# Patient Record
Sex: FEMALE | Race: WHITE | NOT HISPANIC OR LATINO | Employment: UNEMPLOYED | URBAN - METROPOLITAN AREA
[De-identification: names, ages, dates, MRNs, and addresses within clinical notes are randomized per-mention and may not be internally consistent; named-entity substitution may affect disease eponyms.]

---

## 2017-09-19 ENCOUNTER — OFFICE VISIT (OUTPATIENT)
Dept: MEDICAL GROUP | Facility: MEDICAL CENTER | Age: 57
End: 2017-09-19
Attending: NURSE PRACTITIONER
Payer: MEDICAID

## 2017-09-19 VITALS
HEIGHT: 64 IN | BODY MASS INDEX: 27.49 KG/M2 | TEMPERATURE: 97.1 F | WEIGHT: 161 LBS | DIASTOLIC BLOOD PRESSURE: 62 MMHG | OXYGEN SATURATION: 95 % | SYSTOLIC BLOOD PRESSURE: 110 MMHG | RESPIRATION RATE: 12 BRPM | HEART RATE: 84 BPM

## 2017-09-19 DIAGNOSIS — S90.561A INSECT BITE OF RIGHT ANKLE, INITIAL ENCOUNTER: ICD-10-CM

## 2017-09-19 DIAGNOSIS — L82.1 SEBORRHEIC KERATOSIS: ICD-10-CM
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
## 2017-09-19 DIAGNOSIS — H10.32 ACUTE BACTERIAL CONJUNCTIVITIS OF LEFT EYE: ICD-10-CM

## 2017-09-19 PROCEDURE — 99214 OFFICE O/P EST MOD 30 MIN: CPT | Performed by: NURSE PRACTITIONER

## 2017-09-19 PROCEDURE — 99213 OFFICE O/P EST LOW 20 MIN: CPT | Performed by: NURSE PRACTITIONER

## 2017-09-19 RX ORDER — IBUPROFEN 600 MG/1
600 TABLET ORAL EVERY 12 HOURS PRN
Qty: 60 TAB | Refills: 6 | Status: SHIPPED | OUTPATIENT
Start: 2017-09-19 | End: 2019-04-01 | Stop reason: SDUPTHER

## 2017-09-19 RX ORDER — CYCLOBENZAPRINE HCL 10 MG
TABLET ORAL
Qty: 45 TAB | Refills: 6 | Status: SHIPPED | OUTPATIENT
Start: 2017-09-19 | End: 2018-06-04

## 2017-09-19 RX ORDER — TRAZODONE HYDROCHLORIDE 50 MG/1
50 TABLET ORAL DAILY
Qty: 30 TAB | Refills: 6 | Status: SHIPPED | OUTPATIENT
Start: 2017-09-19 | End: 2018-02-14

## 2017-09-19 RX ORDER — POLYMYXIN B SULFATE AND TRIMETHOPRIM 1; 10000 MG/ML; [USP'U]/ML
1 SOLUTION OPHTHALMIC 4 TIMES DAILY
Qty: 1 BOTTLE | Refills: 0 | Status: SHIPPED | OUTPATIENT
Start: 2017-09-19 | End: 2017-09-22

## 2017-09-19 RX ORDER — SULFAMETHOXAZOLE AND TRIMETHOPRIM 800; 160 MG/1; MG/1
1 TABLET ORAL 2 TIMES DAILY
Qty: 14 TAB | Refills: 0 | Status: SHIPPED | OUTPATIENT
Start: 2017-09-19 | End: 2017-09-26

## 2017-09-19 ASSESSMENT — PAIN SCALES - GENERAL: PAINLEVEL: NO PAIN

## 2017-09-19 NOTE — PROGRESS NOTES
"Subjective:     Chief Complaint   Patient presents with   • Bug Bite     Yola Campuzano is a 57 y.o. female here today for multiple problemsas listed below.      She reports that she woke up 6 days ago with redness and drainage to her left eye. She states that she had \"2 bumps\" under her eye that she thought were bites. She states her symptoms have improved but have not resolved. She has been using artificial tears and eye drops for red eyes. She notes now that her eye feels dry and like there is something in it. She reports mild eye pain but states her vision is intact.     She also reports that she has bug bites to her right inner ankle that she woke up with on Saturday morning. She states that it initally looked like 2 red bumps with pinpoint white bumps. She notes this is increasing in size. She states that it is itchy and tender if you touch it and the tenderness extends up her leg. She has been applying hydrocortisone without relief. She notes she began feeling \"flu-like\" on Saturday night and has continued to have chills and fever although she has not measured this.     She also reports a light brown spot that is rough to her left outer breast that has been present for a month. She denies any underlying lump. She denies any change since she first noticed it. Her last mammogram was in December and was normal. It is not friable. She does not have a history of skin CA.      Current medicines (including changes today)  Current Outpatient Prescriptions   Medication Sig Dispense Refill   • sulfamethoxazole-trimethoprim (BACTRIM DS) 800-160 MG tablet Take 1 Tab by mouth 2 times a day for 7 days. 14 Tab 0   • polymixin-trimethoprim (POLYTRIM) 74208-6.1 UNIT/ML-% Solution Place 1 Drop in both eyes 4 times a day for 3 days. 1 Bottle 0   • Cholecalciferol (D3 SUPER STRENGTH) 2000 UNIT Cap TAKE 1 CAPSULE ORALLY ONCE DAILY 30 Cap 6   • trazodone (DESYREL) 50 MG Tab Take 1 Tab by mouth every day. Nightly for chronic " pain 30 Tab 6   • ibuprofen (MOTRIN) 600 MG Tab Take 1 Tab by mouth every 12 hours as needed for Mild Pain. 60 Tab 6   • cyclobenzaprine (FLEXERIL) 10 MG Tab TAKE 1/2 TO 1 TABLET ORALLY 3 TIMES DAILY IF NEEDED FOR MILD PAIN OR MUSCLE SPASMS 45 Tab 6   • ipratropium (ATROVENT) 17 MCG/ACT Aero Soln Inhale 2 Puffs by mouth 4 times a day. 1 Inhaler 6     No current facility-administered medications for this visit.      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 She also has no past medical history of Addisons disease (CMS-HCC); Adrenal disorder (CMS-HCC); Allergy; Anemia; Arrhythmia; Arthritis; ASTHMA; Blood transfusion; CATARACT; CHF (congestive heart failure) (CMS-HCC); Clotting disorder (CMS-HCC); COPD; Cushings syndrome (CMS-HCC); Diabetes; Diabetic neuropathy (CMS-HCC); EMPHYSEMA; GERD (gastroesophageal reflux disease); Glaucoma; Goiter; Headache; Heart attack (CMS-HCC); Heart murmur; HIV (human immunodeficiency virus infection); IBD (inflammatory bowel disease); Kidney disease; Meningitis; Migraine; OSTEOPOROSIS; Parathyroid disorder (CMS-HCC); Pituitary disease (CMS-HCC); Sickle cell disease (CMS-HCC); Stroke (CMS-HCC); Thyroid disease; or Ulcer (CMS-HCC).      Current medications, allergies and problems list reviewed and updated in [facility].      ROS   Review of systems as documented above in history of present illness.         Objective:     Blood  "pressure 110/62, pulse 84, temperature 36.2 °C (97.1 °F), resp. rate 12, height 1.626 m (5' 4.02\"), weight 73 kg (161 lb), last menstrual period 12/18/2009, SpO2 95 %. Body mass index is 27.62 kg/m².     Physical Exam:  Alert, oriented in no acute distress.  Eye contact is good, speech goal directed, affect calm  HEENT: left conjunctiva is mildly injected. There is mild edema to her upper and lower lids with no obvious bites. Clear drainage is present.  Oral mucous membranes pink and moist with no lesions.  Neck: Supple without adenopathy.  Lungs: normal respiratory rate.   CV: regular rate  Breast: she has a brown, slightly raised lesion to her left outer breast consistent with a seborrheic keratosis.  Skin: 2 raised reddish-purple bumps noted to right inner ankle with 2 central punctate white heads. There is not streaking or adenopathy noted. She does have tenderness that extends up from the area to her right medial mid calf area.      Assessment and Plan:   The following treatment plan was discussed     1. Insect bite of right ankle, initial encounter  I do suspect she was bitten by a venomous insect, most likely a spider based on the characteristics. She does report some flu-like symptoms and has some tenderness up her leg and therefore will start Bactrim BID for 7 days.  RTC in 1 week for re-check. To ER if symptoms worsen despite antibiotic.    sulfamethoxazole-trimethoprim (BACTRIM DS) 800-160 MG tablet   2. Acute bacterial conjunctivitis of left eye  Will start Polytrim for 3 days to her left eye due to ongoing symptoms of redness and drainage.  Stop using the red eye drops. May continue with artifical tears as needed for dryness.    polymixin-trimethoprim (POLYTRIM) 07416-0.1 UNIT/ML-% Solution   3. Seborrheic keratosis  She was educated that the spot of concern on her breast appears like a seborrheic keratosis. It is not a cancerous lesion but we will continue to monitor it. She was educated in the " characteristics of concerning skin lesions and is to report if any of these occur. She will be due for her annual mammogram in December.        Followup: Return in about 1 week (around 9/26/2017), or if symptoms worsen or fail to improve, for Pap.          Please note that this dictation was created using voice recognition software. Every reasonable attempt has been made to correct obvious errors, however there may be errors of grammar and possibly content that were not discovered before finalizing the note.

## 2017-09-27 ENCOUNTER — OFFICE VISIT (OUTPATIENT)
Dept: MEDICAL GROUP | Facility: MEDICAL CENTER | Age: 57
End: 2017-09-27
Attending: NURSE PRACTITIONER
Payer: MEDICAID

## 2017-09-27 ENCOUNTER — HOSPITAL ENCOUNTER (OUTPATIENT)
Facility: MEDICAL CENTER | Age: 57
End: 2017-09-27
Attending: NURSE PRACTITIONER
Payer: MEDICAID

## 2017-09-27 VITALS
HEIGHT: 64 IN | BODY MASS INDEX: 27.49 KG/M2 | RESPIRATION RATE: 20 BRPM | SYSTOLIC BLOOD PRESSURE: 110 MMHG | HEART RATE: 80 BPM | TEMPERATURE: 96.6 F | WEIGHT: 161 LBS | OXYGEN SATURATION: 96 % | DIASTOLIC BLOOD PRESSURE: 66 MMHG

## 2017-09-27 DIAGNOSIS — M25.512 ACUTE PAIN OF LEFT SHOULDER: ICD-10-CM

## 2017-09-27 DIAGNOSIS — Z12.4 SCREENING FOR CERVICAL CANCER: ICD-10-CM

## 2017-09-27 DIAGNOSIS — Z01.419 ROUTINE GYNECOLOGICAL EXAMINATION: ICD-10-CM
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
## 2017-09-27 PROCEDURE — G0101 CA SCREEN;PELVIC/BREAST EXAM: HCPCS | Mod: 25 | Performed by: NURSE PRACTITIONER

## 2017-09-27 PROCEDURE — 87624 HPV HI-RISK TYP POOLED RSLT: CPT

## 2017-09-27 PROCEDURE — 88175 CYTOPATH C/V AUTO FLUID REDO: CPT

## 2017-09-27 PROCEDURE — 99212 OFFICE O/P EST SF 10 MIN: CPT | Performed by: NURSE PRACTITIONER

## 2017-09-27 PROCEDURE — 99213 OFFICE O/P EST LOW 20 MIN: CPT | Mod: 25 | Performed by: NURSE PRACTITIONER

## 2017-09-27 ASSESSMENT — PAIN SCALES - GENERAL: PAINLEVEL: NO PAIN

## 2017-09-28 LAB
CYTOLOGY REG CYTOL: NORMAL
HPV HR 12 DNA CVX QL NAA+PROBE: NEGATIVE
HPV16 DNA SPEC QL NAA+PROBE: NEGATIVE
HPV18 DNA SPEC QL NAA+PROBE: NEGATIVE
SPECIMEN SOURCE: NORMAL

## 2017-10-04 ENCOUNTER — TELEPHONE (OUTPATIENT)
Dept: MEDICAL GROUP | Facility: MEDICAL CENTER | Age: 57
End: 2017-10-04

## 2017-10-04 NOTE — LETTER
October 4, 2017    Yola Campuzano  [address]  [facility] [address]      Dear Yola:    The results of your most recent Pap smear are normal. This means that no cancerous or precancerous cells were seen. We recommend that you come back in 3 years for your next routine Pap smear.    If you have any questions or concerns, please don't hesitate to call.      Sincerely,        ELVA Clay.P.AUTUMN.    Electronically Signed

## 2017-10-05 NOTE — TELEPHONE ENCOUNTER
----- Message from AMERICO Clay sent at 10/3/2017  8:14 AM PDT -----  Please inform patient that her Pap Smear results were reviewed and are within normal limits and a screening exam is recommended in 3 years. Thank you.

## 2018-02-09 DIAGNOSIS — F17.200 TOBACCO DEPENDENCE: ICD-10-CM

## 2018-02-12 RX ORDER — IPRATROPIUM BROMIDE 17 UG/1
2 AEROSOL, METERED RESPIRATORY (INHALATION) 4 TIMES DAILY
Qty: 1 INHALER | Refills: 5 | Status: SHIPPED | OUTPATIENT
Start: 2018-02-12 | End: 2018-02-14 | Stop reason: SDUPTHER

## 2018-02-14 ENCOUNTER — OFFICE VISIT (OUTPATIENT)
Dept: MEDICAL GROUP | Facility: MEDICAL CENTER | Age: 58
End: 2018-02-14
Attending: NURSE PRACTITIONER
Payer: MEDICAID

## 2018-02-14 VITALS
SYSTOLIC BLOOD PRESSURE: 116 MMHG | BODY MASS INDEX: 26.77 KG/M2 | OXYGEN SATURATION: 94 % | DIASTOLIC BLOOD PRESSURE: 64 MMHG | TEMPERATURE: 97.6 F | HEIGHT: 64 IN | WEIGHT: 156.8 LBS

## 2018-02-14 DIAGNOSIS — Z12.39 SCREENING FOR BREAST CANCER: ICD-10-CM

## 2018-02-14 DIAGNOSIS — R19.5 POSITIVE FIT (FECAL IMMUNOCHEMICAL TEST): ICD-10-CM

## 2018-02-14 DIAGNOSIS — F51.01 PRIMARY INSOMNIA: ICD-10-CM

## 2018-02-14 DIAGNOSIS — F17.200 TOBACCO DEPENDENCE: ICD-10-CM

## 2018-02-14 DIAGNOSIS — S49.92XD INJURY OF LEFT SHOULDER, SUBSEQUENT ENCOUNTER: ICD-10-CM

## 2018-02-14 DIAGNOSIS — K02.9 DENTAL CARIES: ICD-10-CM

## 2018-02-14 DIAGNOSIS — E78.5 DYSLIPIDEMIA: ICD-10-CM

## 2018-02-14 PROCEDURE — 99214 OFFICE O/P EST MOD 30 MIN: CPT | Performed by: NURSE PRACTITIONER

## 2018-02-14 PROCEDURE — 99213 OFFICE O/P EST LOW 20 MIN: CPT | Performed by: NURSE PRACTITIONER

## 2018-02-14 RX ORDER — CHOLECALCIFEROL (VITAMIN D3) 50 MCG
1 TABLET ORAL DAILY
Qty: 90 EACH | Refills: 4 | Status: SHIPPED | OUTPATIENT
Start: 2018-02-14 | End: 2019-09-03

## 2018-02-14 ASSESSMENT — PATIENT HEALTH QUESTIONNAIRE - PHQ9: CLINICAL INTERPRETATION OF PHQ2 SCORE: 0

## 2018-02-14 NOTE — ASSESSMENT & PLAN NOTE
She had a positive FIT test in the past few months. She declined a colonoscopy and states she will redo the FIT test instead.

## 2018-02-14 NOTE — ASSESSMENT & PLAN NOTE
She is still having issues with insomnia. Stopped taking the trazodone because she felt like it wasn't working well. She has been taking the cyclobenzaprine PRN QHS for both the sleep aide and for her low back pain. She says this helps. She is starting to sleep earlier, usaully around midnight. She falls asleep fairly easily and wakes once to use the bathroom. She often gets out of bed between 8-10am. She is currently looking for a job and needs to ensure she is awake during the day and asleep at night, rather than the reverse.

## 2018-02-14 NOTE — PROGRESS NOTES
Subjective:     Chief Complaint   Patient presents with   • Flu Vaccine   • Orders Needed   • Hip Pain     might have pulled muscle dancing    • Arm Pain     somtimes     Yola Campuzano is a 58 y.o. female here today for multiple problems as listed below    Osteopenia  Yola is here to ask for a copy of her labslip. She states she is currently taking vitamin D for her osteopenia.     Insomnia  She is still having issues with insomnia. Stopped taking the trazodone because she felt like it wasn't working well. She has been taking the cyclobenzaprine PRN QHS for both the sleep aide and for her low back pain. She says this helps. She is starting to sleep earlier, usaully around midnight. She falls asleep fairly easily and wakes once to use the bathroom. She often gets out of bed between 8-10am. She is currently looking for a job and needs to ensure she is awake during the day and asleep at night, rather than the reverse.    Positive FIT (fecal immunochemical test)  She had a positive FIT test in the past few months. She declined a colonoscopy and states she will redo the FIT test instead.     Tobacco dependence  Yola has decrease her smoking from 1 ppd to 1/2ppd. She states she did this because she is no longer allowed to smoke inside her apartment and has to go outside. She is thinking about quitting and would like to discuss options     She is also requesting a referral for PT for both her shoulder and her low back pain. She is taking NSAIDS PRN with minimal relief for both, and using the cyclobenzaprine mostly as a sleep aide, although it does help her pain. The left shoulder has been hurting for about 3 months, and is now causing a decrease in range of motion. The pain is worse with movement and relieved at risk. Worst pain when reaching behind her back. The low back pain is chronic, and she states she has a new-onset worsening due to thinking she pulled a muscle when dancing.    Finally, she requests a  referral for dentistry. She has multiple dental cavities and some swollen gums. She is swiching and spitting daily with peroxide, which is helping the pain and swelling, but she needs to see a dentist.      Current medicines (including changes today)  Current Outpatient Prescriptions   Medication Sig Dispense Refill   • nicotine polacrilex (NICORETTE) 2 MG Gum Take 1 Each by mouth as needed for Smoking Cessation. 50 Each 4   • ipratropium (ATROVENT HFA) 17 MCG/ACT Aero Soln Inhale 2 Puffs by mouth 4 times a day. 1 Inhaler 5   • Prenatal MV-Min-Fe Fum-FA-DHA (PRENATAL MULTIVITAMIN + DHA) 28-0.8 & 200 MG Misc Take 1 Tab by mouth every day. 90 Each 4   • Cholecalciferol (D3 SUPER STRENGTH) 2000 UNIT Cap TAKE 1 CAPSULE ORALLY ONCE DAILY 30 Cap 6   • ibuprofen (MOTRIN) 600 MG Tab Take 1 Tab by mouth every 12 hours as needed for Mild Pain. 60 Tab 6   • cyclobenzaprine (FLEXERIL) 10 MG Tab TAKE 1/2 TO 1 TABLET ORALLY 3 TIMES DAILY IF NEEDED FOR MILD PAIN OR MUSCLE SPASMS 45 Tab 6     No current facility-administered medications for this visit.      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 She also has no past medical history of Addisons disease (CMS-HCC); Adrenal disorder (CMS-HCC); Allergy; Anemia; Arrhythmia; Arthritis; ASTHMA; Blood transfusion; CATARACT; CHF (congestive heart failure) (CMS-HCC); Clotting disorder (CMS-HCC); COPD; Cushings syndrome  "(CMS-HCC); Diabetes; Diabetic neuropathy (CMS-HCC); EMPHYSEMA; GERD (gastroesophageal reflux disease); Glaucoma; Goiter; Headache(784.0); Heart attack; Heart murmur; HIV (human immunodeficiency virus infection); IBD (inflammatory bowel disease); Kidney disease; Meningitis; Migraine; OSTEOPOROSIS; Parathyroid disorder (CMS-HCC); Pituitary disease (CMS-HCC); Sickle cell disease (CMS-HCC); Stroke (CMS-HCC); Thyroid disease; or Ulcer (CMS-HCC).      Current medications, allergies and problems list reviewed and updated in [facility].      ROS   No chest pain, no shortness of breath, no abdominal pain       Objective:     Blood pressure 116/64, temperature 36.4 °C (97.6 °F), height 1.626 m (5' 4\"), weight 71.1 kg (156 lb 12.8 oz), last menstrual period 12/18/2009, SpO2 94 %. Body mass index is 26.91 kg/m².   Physical Exam:  Alert, oriented in no acute distress.  Eye contact is good, speech goal directed, affect calm  HEENT: conjunctiva non-injected, sclera non-icteric.  Pinna normal. TM pearly gray.   Oral mucous membranes pink and moist with no lesions. Very poor dentition with multiple cavities and broken teeth  Neck: No adenopathy or masses in the neck or supraclavicular regions. No JVD.  Lungs: clear to auscultation bilaterally with good excursion.  CV: regular rate and rhythm.  Abdomen: soft, nontender, No CVAT  Ext: no edema, color normal, vascularity normal, temperature normal  Skin: no rashes or lesions in visible areas.  MSK: full ROM in 4 extremities. Normal gait. No joint swelling/deformity. Pain with external rotation of left arm      Assessment and Plan:   The following treatment plan was discussed   1. Injury of left shoulder, subsequent encounter  REFERRAL TO PHYSICAL THERAPY Reason for Therapy: Eval/Treat/Report  Continue NSAIDS and flexeril PRN   2. Positive FIT (fecal immunochemical test)  Verbalized agreement to repeat FIT test   3. Dyslipidemia  LIPID PANEL   4. Primary insomnia  Continue using flexeril as " sleep aide   5. Screening for breast cancer  MA-SCREEN MAMMO W/CAD-BILAT   6. Tobacco dependence  ipratropium (ATROVENT HFA) 17 MCG/ACT Aero Soln  Begin Nicorette 2mg with every cigarette craving   7. Dental caries  REFERRAL TO DENTISTRY       Followup: 1 month for labs

## 2018-02-14 NOTE — ASSESSMENT & PLAN NOTE
Yola has decrease her smoking from 1 ppd to 1/2ppd. She states she did this because she is no longer allowed to smoke inside her apartment and has to go outside. She is thinking about quitting and would like to discuss options

## 2018-02-14 NOTE — ASSESSMENT & PLAN NOTE
Yola is here to ask for a copy of her labslip. She states she is currently taking vitamin D for her osteopenia.

## 2018-02-26 ENCOUNTER — PHYSICAL THERAPY (OUTPATIENT)
Dept: PHYSICAL THERAPY | Facility: REHABILITATION | Age: 58
End: 2018-02-26
Attending: NURSE PRACTITIONER
Payer: MEDICAID

## 2018-02-26 DIAGNOSIS — M75.42 IMPINGEMENT SYNDROME OF LEFT SHOULDER: ICD-10-CM

## 2018-02-26 PROCEDURE — 97162 PT EVAL MOD COMPLEX 30 MIN: CPT

## 2018-02-26 ASSESSMENT — ENCOUNTER SYMPTOMS
PAIN SCALE AT HIGHEST: 10
PAIN SCALE AT LOWEST: 0
PAIN SCALE: 1
QUALITY: STABBING
PAIN TIMING: WHEN ACTIVE
QUALITY: THROBBING
QUALITY: CRAMPING

## 2018-02-26 NOTE — OP THERAPY EVALUATION
Outpatient Physical Therapy  INITIAL EVALUATION    [facility] [address]  [address]  [place] [address]  Phone:  [phone]  Fax:  [phone]    Date of Evaluation: 2018    Patient: Yola Campuzano  YOB: 1960  MRN: [id number]     Referring Provider: ISIS Russell  [address] [place] [address]  [address]  [place] [address]   Referring Diagnosis Injury of left shoulder, subsequent encounter [S49.92XD]     Time Calculation  Start time: 1200  Stop time: 1235 Time Calculation (min): 35 minutes         Chief Complaint: Shoulder Pain    Visit Diagnoses     ICD-10-CM   1. Impingement syndrome of left shoulder M75.42         Subjective   History of Present Illness:     History of chief complaint:  Pt w/insidious onset bilateral shoulder pain about one year, L>R, noted during sleep. PMHx includes Guillain-[place] syndrome in , hospitalized x 10 days and severe anxiety. Pt also reports more recent c/o hip pain.    Pain:     Current pain ratin    At best pain ratin    At worst pain rating:  10    Location:  Left lateral shoulder (deltoid insertion area)    Quality:  Throbbing, cramping and stabbing    Pain timing:  When active    Aggravating factors:  Sleeping only 1-2 hrs at a time, Reaching OH, Donning bra/reaching behind back    Relieving factors:  Massage, Hot shower, Changing position, Ibuprofen     Activity Tolerance:     Current activity tolerance / Recreational activities:  Relatively sedentary except for ADLs, Dancing 1x/wk through pain    Social Support:     Lives in:  Apartment    Lives with:  Alone      Past Medical History:   Diagnosis Date   • Anxiety    • Carpal tunnel syndrome on right 10/30/2012   • Depression    • Dyslipidemia 2012   • Dyslipidemia 2012   • Elevated blood pressure reading without diagnosis of hypertension 2012   • History of Guillain-[place] syndrome 2016   • Hypertension    • Low back pain 2012    • Numbness and tingling in right hand 8/14/2012   • Osteopenia 8/29/2012   • Seizure (CMS-HCC)     related to ETOH WD   • Skin tag 8/4/2015   • Substance abuse    • Tobacco dependence 8/14/2012   • Tuberculosis     was dx in a rehab ceneter in 2006   • Urinary tract infection, site not specified    • Vaginal discharge 8/14/2012   • Vitamin d deficiency 9/25/2012     Past Surgical History:   Procedure Laterality Date   • OPEN REDUCTION  1991    left ankle       Precautions:       Objective   Observation and functional movement:  Pt w/FHP and T/S kyphosis    Range of motion and strength:    Shoulder AROM: Flex B = 160, Abd B = 150, HAdd B = PIP to AC, HBB R = T8 and L = T12    Palpation and joint mobility:     TTP over AC jt and subscap insertion bilaterally, hypomobile GH post > inf bilaterally    Special tests:      + impingement bilaterally          Exercises/Treatment  Time-based treatments/modalities:          Assessment, Response and Plan:   Assessment details:  58 yr old female presents w/subacute to chronic sxs consistent w/L > R shoulder impingement. Skilled PT indicated to address the following goals.  Goals:   Short Term Goals:   Able to sleep waking less than 2x/night  50% decreased c/o shoulder pain donning bra  50% decreased c/o shoulder pain reaching OH for ADLs  Short term goal time span:  2-4 weeks      Long Term Goals:    Able to sleep waking less than 2x/night  80% decreased c/o shoulder pain donning bra  80% decreased c/o shoulder pain reaching OH for ADLs  Long term goal time span:  4-6 weeks    Plan:   Planned therapy interventions:  E Stim Unattended (CPT 07755), Therapeutic Exercise (CPT 64656) and Neuromuscular Re-education (CPT 89347)  Frequency:  2x week  Duration in weeks:  6  Plan details:  Continue skilled PT to address T/S mobility, posture re-ed, and RC strengthening.      Referring provider co-signature:  I have reviewed this plan of care and my co-signature certifies the need for  services.    Physician Signature: ________________________________ Date: ______________

## 2018-03-12 ENCOUNTER — HOSPITAL ENCOUNTER (OUTPATIENT)
Dept: RADIOLOGY | Facility: MEDICAL CENTER | Age: 58
End: 2018-03-12
Attending: NURSE PRACTITIONER
Payer: MEDICAID

## 2018-03-12 ENCOUNTER — TELEPHONE (OUTPATIENT)
Dept: MEDICAL GROUP | Facility: MEDICAL CENTER | Age: 58
End: 2018-03-12

## 2018-03-12 DIAGNOSIS — Z12.39 SCREENING FOR BREAST CANCER: ICD-10-CM

## 2018-03-12 PROCEDURE — 77067 SCR MAMMO BI INCL CAD: CPT

## 2018-03-12 NOTE — TELEPHONE ENCOUNTER
1. Caller Name: adrian                                         Call Back Number: [phone] (home)         Patient approves a detailed voicemail message: yes    Pt lvm stating she thought at her last appt you guys discussed getting lab work done. I do not see any orders would you mind placing some?

## 2018-03-13 NOTE — TELEPHONE ENCOUNTER
We spoke about her repeating her FIT test, and she should have received that information at her last appointment. Please call pt to f/u and ensure she has gotten the FIT test. If not, please have her come in to get the FIT testing card.

## 2018-03-15 ENCOUNTER — PHYSICAL THERAPY (OUTPATIENT)
Dept: PHYSICAL THERAPY | Facility: REHABILITATION | Age: 58
End: 2018-03-15
Attending: NURSE PRACTITIONER
Payer: MEDICAID

## 2018-03-15 DIAGNOSIS — M75.42 IMPINGEMENT SYNDROME OF LEFT SHOULDER: ICD-10-CM

## 2018-03-15 PROCEDURE — 97014 ELECTRIC STIMULATION THERAPY: CPT

## 2018-03-15 PROCEDURE — 97110 THERAPEUTIC EXERCISES: CPT

## 2018-03-15 NOTE — OP THERAPY DAILY TREATMENT
Outpatient Physical Therapy  DAILY TREATMENT     [facility] [address].  [address]  [place] [address]  Phone:  [phone]  Fax:  [phone]    Date: 03/15/2018    Patient: Yola Campuzano  YOB: 1960  MRN: [id number]     Time Calculation    1530 1620 50 min.     Chief Complaint: Shoulder Pain    Visit #: 2    SUBJECTIVE:  No pain R shoulder, L cont w/moderate ache up to 9/10, currently 4/10.    OBJECTIVE:  AROM L Shoulder: Flex = 132 degrees    Therapeutic Exercises (CPT 09241):     1. Nu Step, x10 min    2. Pulleys flex and abd, x20 ea    3. Rows w/pink TB, x20    Therapeutic Treatments and Modalities:     1. E Stim Unattended (CPT 93319), IFC and MHP to L Shoulder    Time-based treatments/modalities:   Ther Ex = 25 min.     Pain rating before treatment: 4  Pain rating after treatment: 4    ASSESSMENT:   Pt w/mild pain/fear avoidance.    PLAN/RECOMMENDATIONS:   Plan for treatment: therapy treatment to continue next visit.  Planned interventions for next visit: E-stim unattended (CPT 29045), neuromuscular re-education (CPT 34726) and therapeutic exercise (CPT 12335). Foam roller, scap stab, pulleys

## 2018-03-19 ENCOUNTER — PHYSICAL THERAPY (OUTPATIENT)
Dept: PHYSICAL THERAPY | Facility: REHABILITATION | Age: 58
End: 2018-03-19
Attending: NURSE PRACTITIONER
Payer: MEDICAID

## 2018-03-19 DIAGNOSIS — M75.42 IMPINGEMENT SYNDROME OF LEFT SHOULDER: ICD-10-CM

## 2018-03-19 PROCEDURE — 97110 THERAPEUTIC EXERCISES: CPT

## 2018-03-19 PROCEDURE — 97014 ELECTRIC STIMULATION THERAPY: CPT

## 2018-03-19 NOTE — OP THERAPY DAILY TREATMENT
"  Outpatient Physical Therapy  DAILY TREATMENT     [facility] [address].  [address]  [place] [address]  Phone:  [phone]  Fax:  [phone]    Date: 03/19/2018    Patient: Yola Campuzano  YOB: 1960  MRN: [id number]     Time Calculation  Start time: 1630  Stop time: 1720 Time Calculation (min): 50 minutes     Chief Complaint: Shoulder Pain    Visit #: 3    SUBJECTIVE:  Pt reports doing HEP regularly, stiff and achy after 20 reps, \"tends to overdo it\".    OBJECTIVE:  AROM L Shoulder: Flex =  160 degrees    Therapeutic Exercises (CPT 46030):     1. Nu Step, x5 min    2. Pulleys flex and abd, x20 ea    3. True Stretch lunge & trunk rot, x10 ea    4. True Stretch OH head reach w/sit, x10    5. Seated fwd & side trunk stretch, x30 sec ea    Therapeutic Treatments and Modalities:     1. E Stim Unattended (CPT 09019), IFC and MHP to L shoulder    Time-based treatments/modalities:  Therapeutic exercise minutes (CPT 14923): 30 minutes     Pain rating before treatment: 1  Pain rating after treatment: 1 (3-4/10 during activity)    ASSESSMENT:   Pt demos improved ROM and confidence.    PLAN/RECOMMENDATIONS:   Plan for treatment: therapy treatment to continue next visit.  Planned interventions for next visit: E-stim unattended (CPT 01071), neuromuscular re-education (CPT 88141) and therapeutic exercise (CPT 16542). Foam roller, scap stab, pulleys      "

## 2018-03-21 ENCOUNTER — PHYSICAL THERAPY (OUTPATIENT)
Dept: PHYSICAL THERAPY | Facility: REHABILITATION | Age: 58
End: 2018-03-21
Attending: NURSE PRACTITIONER
Payer: MEDICAID

## 2018-03-21 DIAGNOSIS — M75.42 IMPINGEMENT SYNDROME OF LEFT SHOULDER: ICD-10-CM

## 2018-03-21 PROCEDURE — 97110 THERAPEUTIC EXERCISES: CPT

## 2018-03-21 PROCEDURE — 97014 ELECTRIC STIMULATION THERAPY: CPT

## 2018-03-22 NOTE — OP THERAPY DAILY TREATMENT
Outpatient Physical Therapy  DAILY TREATMENT     [facility] [address].  [address]  [place] [address]  Phone:  [phone]  Fax:  [phone]    Date: 03/21/2018    Patient: Yola Campuzano  YOB: 1960  MRN: [id number]     Time Calculation  Start time: 1530  Stop time: 1620 Time Calculation (min): 50 minutes     Chief Complaint: Shoulder Pain    Visit #: 4    SUBJECTIVE:  Pt reports feeling a little bit better.    OBJECTIVE:    Therapeutic Exercises (CPT 61996):     1. Nu Step, x10 min    2. Pulleys flex and abd, x20 ea    3. Qped cat/cow, x10    4. Qped T/S rot, x10 ea    5. Rows w/pink TB, x20    Therapeutic Treatments and Modalities:     1. E Stim Unattended (CPT 84372), IFC and MHP to L shoulder    Time-based treatments/modalities:  Therapeutic exercise minutes (CPT 36336): 30 minutes     Pain rating before treatment: 1  Pain rating after treatment: 1     ASSESSMENT:   Pt making good progress w/ther ex, needs increased compliance at home.    PLAN/RECOMMENDATIONS:   Plan for treatment: therapy treatment to continue next visit.  Foam roller, scap stab, pulleys

## 2018-03-26 ENCOUNTER — PHYSICAL THERAPY (OUTPATIENT)
Dept: PHYSICAL THERAPY | Facility: REHABILITATION | Age: 58
End: 2018-03-26
Attending: NURSE PRACTITIONER
Payer: MEDICAID

## 2018-03-26 DIAGNOSIS — M75.42 IMPINGEMENT SYNDROME OF LEFT SHOULDER: ICD-10-CM

## 2018-03-26 PROCEDURE — 97110 THERAPEUTIC EXERCISES: CPT

## 2018-03-26 PROCEDURE — 97014 ELECTRIC STIMULATION THERAPY: CPT

## 2018-03-26 NOTE — OP THERAPY DAILY TREATMENT
Outpatient Physical Therapy  DAILY TREATMENT     [facility] Physical [address]  [address]  [place] [address]  Phone:  [phone]  Fax:  [phone]    Date: 03/26/2018    Patient: Yola Campuzano  YOB: 1960  MRN: [id number]     Time Calculation  Start time: 1400  Stop time: 1450 Time Calculation (min): 50 minutes     Chief Complaint: Shoulder Pain    Visit #: 5    SUBJECTIVE:  Pt reports increased soreness L shoulder, possibly from qped position.    OBJECTIVE:    Therapeutic Exercises (CPT 62714):     1. Foam Roller, pec stretch, alt UE and alt, serratus    2. Pulleys flex and abd, x20 ea    3. Nu Step, x10 min    Therapeutic Treatments and Modalities:     1. E Stim Unattended (CPT 92671), IFC and MHP to L shoulder    Time-based treatments/modalities:  Therapeutic exercise minutes (CPT 22085): 30 minutes     Pain rating before treatment: 5  Pain rating after treatment: 4    ASSESSMENT:   Pt w/improving adherence to HEP.    PLAN/RECOMMENDATIONS:   Plan for treatment: therapy treatment to continue next visit.  Foam roller, scap stab, pulleys

## 2018-03-29 ENCOUNTER — PHYSICAL THERAPY (OUTPATIENT)
Dept: PHYSICAL THERAPY | Facility: REHABILITATION | Age: 58
End: 2018-03-29
Attending: NURSE PRACTITIONER
Payer: MEDICAID

## 2018-03-29 DIAGNOSIS — M75.42 IMPINGEMENT SYNDROME OF LEFT SHOULDER: ICD-10-CM

## 2018-03-29 PROCEDURE — 97014 ELECTRIC STIMULATION THERAPY: CPT

## 2018-03-29 PROCEDURE — 97110 THERAPEUTIC EXERCISES: CPT

## 2018-03-29 NOTE — OP THERAPY DAILY TREATMENT
Outpatient Physical Therapy  DAILY TREATMENT     [facility] Physical [address]  [address]  [place] [address]  Phone:  [phone]  Fax:  [phone]    Date: 03/29/2018    Patient: Yola Campuzano  YOB: 1960  MRN: [id number]     Time Calculation  Start time: 1500  Stop time: 1540 Time Calculation (min): 40 minutes     Chief Complaint: Shoulder Problem    Visit #: 6    SUBJECTIVE:  Pain intermittent 10/10    OBJECTIVE:  Current objective measures: (L) shoulder hurts worse than (R) AROM (L) 90' abd before pain          Therapeutic Exercises (CPT 56723):     1. [place] , t-band  10    2. Rowing t-band 20, pullys    3. Foam pec    4. Foam serratus punch    5. UBE, 2/2'    Therapeutic Treatments and Modalities:     1. E Stim Unattended (CPT 37068), (L) shoulder 15'    Time-based treatments/modalities:  Therapeutic exercise minutes (CPT 33993): 25 minutes         ASSESSMENT:   Response to treatment: tolerated exercises with occasional pain    PLAN/RECOMMENDATIONS:   Plan for treatment: therapy treatment to continue next visit.  Planned interventions for next visit: E-stim unattended (CPT 00337) and therapeutic exercise (CPT 06870).

## 2018-04-02 ENCOUNTER — APPOINTMENT (OUTPATIENT)
Dept: PHYSICAL THERAPY | Facility: REHABILITATION | Age: 58
End: 2018-04-02
Attending: NURSE PRACTITIONER
Payer: MEDICAID

## 2018-04-04 ENCOUNTER — OFFICE VISIT (OUTPATIENT)
Dept: MEDICAL GROUP | Facility: MEDICAL CENTER | Age: 58
End: 2018-04-04
Attending: FAMILY MEDICINE
Payer: MEDICAID

## 2018-04-04 VITALS
WEIGHT: 156 LBS | SYSTOLIC BLOOD PRESSURE: 120 MMHG | HEART RATE: 80 BPM | TEMPERATURE: 97.3 F | BODY MASS INDEX: 26.63 KG/M2 | HEIGHT: 64 IN | RESPIRATION RATE: 14 BRPM | DIASTOLIC BLOOD PRESSURE: 80 MMHG | OXYGEN SATURATION: 96 %

## 2018-04-04 DIAGNOSIS — J40 BRONCHITIS: ICD-10-CM

## 2018-04-04 LAB
FLUAV+FLUBV AG SPEC QL IA: NEGATIVE
INT CON NEG: NEGATIVE
INT CON POS: POSITIVE

## 2018-04-04 PROCEDURE — 99214 OFFICE O/P EST MOD 30 MIN: CPT | Performed by: FAMILY MEDICINE

## 2018-04-04 PROCEDURE — 99212 OFFICE O/P EST SF 10 MIN: CPT | Performed by: FAMILY MEDICINE

## 2018-04-04 RX ORDER — BENZONATATE 100 MG/1
100 CAPSULE ORAL 3 TIMES DAILY PRN
Qty: 60 CAP | Refills: 0 | Status: SHIPPED | OUTPATIENT
Start: 2018-04-04 | End: 2019-04-01

## 2018-04-04 RX ORDER — ALBUTEROL SULFATE 90 UG/1
2 AEROSOL, METERED RESPIRATORY (INHALATION) EVERY 6 HOURS PRN
Qty: 1 INHALER | Refills: 3 | Status: SHIPPED | OUTPATIENT
Start: 2018-04-04 | End: 2019-09-03

## 2018-04-04 RX ORDER — AZITHROMYCIN 250 MG/1
TABLET, FILM COATED ORAL
Qty: 6 TAB | Refills: 0 | Status: SHIPPED | OUTPATIENT
Start: 2018-04-04 | End: 2019-04-01

## 2018-04-04 ASSESSMENT — ENCOUNTER SYMPTOMS
ABDOMINAL PAIN: 0
VOMITING: 0
HEADACHES: 1
CHILLS: 1
COUGH: 1
SHORTNESS OF BREATH: 0
SPUTUM PRODUCTION: 1
DIARRHEA: 0
SWOLLEN GLANDS: 0
SINUS PAIN: 0
SORE THROAT: 1
NAUSEA: 1
WHEEZING: 0
NECK PAIN: 0
FEVER: 1
RHINORRHEA: 1
PALPITATIONS: 0
MYALGIAS: 0
WEIGHT LOSS: 0
BACK PAIN: 1

## 2018-04-04 NOTE — PROGRESS NOTES
"Subjective:      Yola Campuzano is a 58 y.o. female who presents with Fever and Cough            URI    This is a new problem. The current episode started in the past 7 days. The problem has been unchanged. The maximum temperature recorded prior to her arrival was more than 104 F (per pt). The fever has been present for less than 1 day. Associated symptoms include congestion, coughing, headaches, nausea, rhinorrhea and a sore throat. Pertinent negatives include no abdominal pain, chest pain, diarrhea, dysuria, ear pain, joint pain, joint swelling, neck pain, plugged ear sensation, rash, sinus pain, sneezing, swollen glands, vomiting or wheezing. She has tried acetaminophen and decongestant (will have her continue to use her tessalon as directed and will also have her use an inhaler as needed. if her sxs worsen will have her start a zpak.) for the symptoms.       Review of Systems   Constitutional: Positive for chills and fever. Negative for weight loss.   HENT: Positive for congestion, rhinorrhea and sore throat. Negative for ear pain, hearing loss, sinus pain and sneezing.    Respiratory: Positive for cough and sputum production. Negative for shortness of breath and wheezing.    Cardiovascular: Negative for chest pain and palpitations.   Gastrointestinal: Positive for nausea. Negative for abdominal pain, diarrhea and vomiting.   Genitourinary: Negative for dysuria.   Musculoskeletal: Positive for back pain. Negative for joint pain, myalgias and neck pain.   Skin: Negative for itching and rash.   Neurological: Positive for headaches.          Objective:     /80   Pulse 80   Temp 36.3 °C (97.3 °F)   Resp 14   Ht 1.626 m (5' 4\")   Wt 70.8 kg (156 lb)   LMP 12/18/2009   SpO2 96%   BMI 26.78 kg/m²      Physical Exam   Constitutional: She appears well-developed and well-nourished.   HENT:   Head: Normocephalic and atraumatic.   Congestion, pahyrngeal erythema     Eyes: Conjunctivae and EOM are normal. " Pupils are equal, round, and reactive to light.   Neck: Normal range of motion. Neck supple. No thyromegaly present.   Cardiovascular: Normal rate, regular rhythm, normal heart sounds and intact distal pulses.  Exam reveals no friction rub.    No murmur heard.  Pulmonary/Chest: Effort normal. No respiratory distress. She has no wheezes.   Coarse breath sounds   Abdominal: Soft. Bowel sounds are normal. She exhibits no distension. There is no tenderness.   Lymphadenopathy:     She has no cervical adenopathy.   Nursing note and vitals reviewed.              Assessment/Plan:     1. Bronchitis  A rapid influenza test was done since patient was unable to get a flu shot due to a remote history of GBS. We'll have her continue to use her Tessalon Perles as directed and will add an inhaler to use as needed. If her symptoms continue to persist or worsen we'll have her start a Z-Woody, ER precautions given for suddenly worsened symptoms, chest pains or shortness of breath.

## 2018-04-05 ENCOUNTER — APPOINTMENT (OUTPATIENT)
Dept: PHYSICAL THERAPY | Facility: REHABILITATION | Age: 58
End: 2018-04-05
Attending: NURSE PRACTITIONER
Payer: MEDICAID

## 2018-04-09 ENCOUNTER — APPOINTMENT (OUTPATIENT)
Dept: PHYSICAL THERAPY | Facility: REHABILITATION | Age: 58
End: 2018-04-09
Attending: NURSE PRACTITIONER
Payer: MEDICAID

## 2018-04-12 ENCOUNTER — APPOINTMENT (OUTPATIENT)
Dept: PHYSICAL THERAPY | Facility: REHABILITATION | Age: 58
End: 2018-04-12
Attending: NURSE PRACTITIONER
Payer: MEDICAID

## 2018-04-16 ENCOUNTER — TELEPHONE (OUTPATIENT)
Dept: PHYSICAL THERAPY | Facility: REHABILITATION | Age: 58
End: 2018-04-16

## 2018-04-16 NOTE — OP THERAPY DISCHARGE SUMMARY
Outpatient Physical Therapy  DISCHARGE SUMMARY NOTE      [address].  [address]  [place] [address]  Phone:  [phone]  Fax:  [phone]    Date of Visit: 04/16/2018    Patient: Yola Campuzano  YOB: 1960  MRN: [id number]     Referring Provider: ISIS Russell   Referring Diagnosis Injury of left shoulder, subsequent encounter [S49.92XD]     Physical Therapy Occurrence Codes    Date of onset of impairment:  2/26/17   Date physical therapy care plan established or reviewed:  2/28/18        Your patient is being discharged from Physical Therapy with the following comments:   · Goals partially met  · Patient cancelled or missed more than 2 scheduled appointments/non-compliant    Comments:  Pt w/fluctuating sxs. Demo'd approximately 50% increase in functional ROM. Pt limited most by adherence to regular HEP and illness.     Recommendations:  D/C, pt has cancelled > 3 consecutive appointments.     Kim Harris, PT, DPT    Date: 4/16/2018

## 2018-05-31 DIAGNOSIS — M54.50 CHRONIC MIDLINE LOW BACK PAIN WITHOUT SCIATICA: ICD-10-CM

## 2018-05-31 DIAGNOSIS — G89.29 CHRONIC MIDLINE LOW BACK PAIN WITHOUT SCIATICA: ICD-10-CM

## 2018-06-04 RX ORDER — CYCLOBENZAPRINE HCL 10 MG
TABLET ORAL
Qty: 90 TAB | Refills: 2 | Status: SHIPPED | OUTPATIENT
Start: 2018-06-04 | End: 2019-04-01 | Stop reason: SDUPTHER

## 2019-04-01 ENCOUNTER — HOSPITAL ENCOUNTER (OUTPATIENT)
Dept: RADIOLOGY | Facility: MEDICAL CENTER | Age: 59
End: 2019-04-01
Attending: FAMILY MEDICINE
Payer: MEDICAID

## 2019-04-01 ENCOUNTER — OFFICE VISIT (OUTPATIENT)
Dept: MEDICAL GROUP | Facility: MEDICAL CENTER | Age: 59
End: 2019-04-01
Attending: FAMILY MEDICINE
Payer: MEDICAID

## 2019-04-01 VITALS
SYSTOLIC BLOOD PRESSURE: 115 MMHG | WEIGHT: 165 LBS | TEMPERATURE: 96.6 F | RESPIRATION RATE: 16 BRPM | HEIGHT: 64 IN | OXYGEN SATURATION: 95 % | DIASTOLIC BLOOD PRESSURE: 80 MMHG | BODY MASS INDEX: 28.17 KG/M2 | HEART RATE: 74 BPM

## 2019-04-01 DIAGNOSIS — G89.29 CHRONIC MIDLINE LOW BACK PAIN WITHOUT SCIATICA: ICD-10-CM

## 2019-04-01 DIAGNOSIS — M25.552 HIP PAIN, BILATERAL: ICD-10-CM

## 2019-04-01 DIAGNOSIS — M54.50 CHRONIC MIDLINE LOW BACK PAIN WITHOUT SCIATICA: ICD-10-CM

## 2019-04-01 DIAGNOSIS — M25.551 HIP PAIN, BILATERAL: ICD-10-CM

## 2019-04-01 PROCEDURE — 72100 X-RAY EXAM L-S SPINE 2/3 VWS: CPT

## 2019-04-01 PROCEDURE — 99213 OFFICE O/P EST LOW 20 MIN: CPT | Performed by: FAMILY MEDICINE

## 2019-04-01 PROCEDURE — 73521 X-RAY EXAM HIPS BI 2 VIEWS: CPT

## 2019-04-01 PROCEDURE — 99214 OFFICE O/P EST MOD 30 MIN: CPT | Performed by: FAMILY MEDICINE

## 2019-04-01 RX ORDER — CYCLOBENZAPRINE HCL 10 MG
TABLET ORAL
Qty: 90 TAB | Refills: 2 | Status: SHIPPED | OUTPATIENT
Start: 2019-04-01 | End: 2019-09-03

## 2019-04-01 RX ORDER — GABAPENTIN 100 MG/1
100 CAPSULE ORAL 3 TIMES DAILY
Qty: 90 CAP | Refills: 3 | Status: SHIPPED | OUTPATIENT
Start: 2019-04-01 | End: 2019-05-06

## 2019-04-01 RX ORDER — CHOLECALCIFEROL (VITAMIN D3) 50 MCG
CAPSULE ORAL
Qty: 30 CAP | Refills: 2 | Status: SHIPPED | OUTPATIENT
Start: 2019-04-01 | End: 2019-09-03

## 2019-04-01 RX ORDER — IBUPROFEN 600 MG/1
600 TABLET ORAL EVERY 12 HOURS PRN
Qty: 60 TAB | Refills: 3 | Status: SHIPPED | OUTPATIENT
Start: 2019-04-01 | End: 2019-05-06

## 2019-04-01 ASSESSMENT — ENCOUNTER SYMPTOMS
COUGH: 0
VOMITING: 0
ARTHRALGIAS: 1
MYALGIAS: 1
BACK PAIN: 0
VERTIGO: 0
NUMBNESS: 0
SWOLLEN GLANDS: 0
ABDOMINAL PAIN: 0
NECK PAIN: 0
WEAKNESS: 0
FEVER: 0
ANOREXIA: 0
SPUTUM PRODUCTION: 0
PALPITATIONS: 0
SORE THROAT: 0
PAIN: 1
HEADACHES: 0
CHILLS: 0
SHORTNESS OF BREATH: 0
NAUSEA: 0
CHANGE IN BOWEL HABIT: 0
JOINT SWELLING: 0
VISUAL CHANGE: 0
FATIGUE: 0
DIAPHORESIS: 0
PSYCHIATRIC NEGATIVE: 1

## 2019-04-01 ASSESSMENT — PATIENT HEALTH QUESTIONNAIRE - PHQ9: CLINICAL INTERPRETATION OF PHQ2 SCORE: 0

## 2019-04-01 NOTE — PROGRESS NOTES
"Subjective:      Yola Campuzano is a 59 y.o. female who presents with Hip Pain (bilateral)            Pain   This is a new problem. The current episode started 1 to 4 weeks ago. The problem has been gradually worsening. Associated symptoms include arthralgias and myalgias. Pertinent negatives include no abdominal pain, anorexia, change in bowel habit, chest pain, chills, congestion, coughing, diaphoresis, fatigue, fever, headaches, joint swelling, nausea, neck pain, numbness, rash, sore throat, swollen glands, urinary symptoms, vertigo, visual change, vomiting or weakness. The symptoms are aggravated by standing, walking and bending. She has tried NSAIDs (We will have her get x-rays of her back and hips, we will refill her Motrin, and have her start Flexeril and Neurontin as directed.  A referral to physical therapy will also be made.) for the symptoms.       Review of Systems   Constitutional: Negative for chills, diaphoresis, fatigue and fever.   HENT: Negative for congestion, hearing loss, sore throat and tinnitus.    Respiratory: Negative for cough, sputum production and shortness of breath.    Cardiovascular: Negative for chest pain and palpitations.   Gastrointestinal: Negative for abdominal pain, anorexia, change in bowel habit, nausea and vomiting.   Musculoskeletal: Positive for arthralgias and myalgias. Negative for back pain, joint swelling and neck pain.   Skin: Negative for rash.   Neurological: Negative for vertigo, weakness, numbness and headaches.   Psychiatric/Behavioral: Negative.           Objective:     /80 (BP Location: Left arm, Patient Position: Sitting)   Pulse 74   Temp 35.9 °C (96.6 °F)   Resp 16   Ht 1.626 m (5' 4\")   Wt 74.8 kg (165 lb)   LMP 12/18/2009   SpO2 95%   BMI 28.32 kg/m²      Physical Exam   Constitutional: She is oriented to person, place, and time.   HENT:   Head: Normocephalic and atraumatic.   Eyes: Pupils are equal, round, and reactive to light. " Conjunctivae and EOM are normal.   Neck: Normal range of motion. Neck supple.   Cardiovascular: Normal rate, regular rhythm and normal heart sounds.  Exam reveals no friction rub.    No murmur heard.  Pulmonary/Chest: Effort normal and breath sounds normal. No respiratory distress. She has no wheezes. She has no rales.   Abdominal: Soft. Bowel sounds are normal. She exhibits no distension. There is no tenderness.   Musculoskeletal: Normal range of motion.   Neurological: She is alert and oriented to person, place, and time.   Skin: Skin is warm and dry.   Psychiatric: She has a normal mood and affect. Her behavior is normal.   Nursing note and vitals reviewed.              Assessment/Plan:     1. Hip pain, bilateral  An x-ray of her hip and lower back will be ordered, will also have her continue using her Motrin and Flexeril as directed.  We will add Neurontin at 100 mg 3 times daily.  We will also refer to physical therapy for additional assistance in management of her lower back pain as well as hip pain.  We will continue to follow.  - DX-HIP-BILATERAL-WITH PELVIS-3/4 VIEWS; Future    2. Chronic midline low back pain without sciatica  See above plan.  - cyclobenzaprine (FLEXERIL) 10 MG Tab; TAKE 1/2 TO 1 TABLET BY MOUTH 3 TIMES DAILY IF NEEDED FOR MILD PAIN OR MUSCLE SPASMS  Dispense: 90 Tab; Refill: 2  - DX-LUMBAR SPINE-2 OR 3 VIEWS; Future

## 2019-04-01 NOTE — TELEPHONE ENCOUNTER
Was the patient seen in the last year in this department? Yes    Does patient have an active prescription for medications requested? No     Received Request Via: Pharmacy     Had an appt this am with Dr. Bowman

## 2019-05-06 ENCOUNTER — OFFICE VISIT (OUTPATIENT)
Dept: MEDICAL GROUP | Facility: MEDICAL CENTER | Age: 59
End: 2019-05-06
Attending: FAMILY MEDICINE
Payer: MEDICAID

## 2019-05-06 VITALS
RESPIRATION RATE: 14 BRPM | BODY MASS INDEX: 29.02 KG/M2 | OXYGEN SATURATION: 95 % | HEART RATE: 76 BPM | WEIGHT: 170 LBS | HEIGHT: 64 IN | TEMPERATURE: 97.6 F | SYSTOLIC BLOOD PRESSURE: 130 MMHG | DIASTOLIC BLOOD PRESSURE: 90 MMHG

## 2019-05-06 DIAGNOSIS — Z00.00 ROUTINE HEALTH MAINTENANCE: ICD-10-CM

## 2019-05-06 DIAGNOSIS — Z12.31 SCREENING MAMMOGRAM, ENCOUNTER FOR: ICD-10-CM

## 2019-05-06 DIAGNOSIS — Z12.11 COLON CANCER SCREENING: ICD-10-CM

## 2019-05-06 DIAGNOSIS — M54.40 BILATERAL LOW BACK PAIN WITH SCIATICA, SCIATICA LATERALITY UNSPECIFIED, UNSPECIFIED CHRONICITY: ICD-10-CM

## 2019-05-06 DIAGNOSIS — K02.9 DENTAL CARIES: ICD-10-CM

## 2019-05-06 PROCEDURE — 99212 OFFICE O/P EST SF 10 MIN: CPT | Performed by: FAMILY MEDICINE

## 2019-05-06 PROCEDURE — 99214 OFFICE O/P EST MOD 30 MIN: CPT | Performed by: FAMILY MEDICINE

## 2019-05-06 RX ORDER — ERGOCALCIFEROL 1.25 MG/1
50000 CAPSULE ORAL
Qty: 3 CAP | Refills: 3 | Status: SHIPPED | OUTPATIENT
Start: 2019-05-06 | End: 2019-09-03 | Stop reason: SDUPTHER

## 2019-05-06 RX ORDER — IBUPROFEN 600 MG/1
600 TABLET ORAL EVERY 12 HOURS PRN
Qty: 60 TAB | Refills: 3 | Status: SHIPPED | OUTPATIENT
Start: 2019-05-06 | End: 2019-09-03 | Stop reason: SDUPTHER

## 2019-05-06 RX ORDER — AMOXICILLIN 500 MG/1
500 CAPSULE ORAL 3 TIMES DAILY
Qty: 30 CAP | Refills: 0 | Status: SHIPPED | OUTPATIENT
Start: 2019-05-06 | End: 2019-09-03

## 2019-05-06 RX ORDER — GABAPENTIN 300 MG/1
300 CAPSULE ORAL 3 TIMES DAILY
Qty: 90 CAP | Refills: 3 | Status: SHIPPED | OUTPATIENT
Start: 2019-05-06 | End: 2019-08-27 | Stop reason: SDUPTHER

## 2019-05-06 ASSESSMENT — ENCOUNTER SYMPTOMS
HEADACHES: 0
ABDOMINAL PAIN: 0
FOCAL WEAKNESS: 0
VOMITING: 0
TINGLING: 1
PSYCHIATRIC NEGATIVE: 1
SPUTUM PRODUCTION: 0
COUGH: 0
SHORTNESS OF BREATH: 0
NAUSEA: 0
TREMORS: 0
SENSORY CHANGE: 0
FEVER: 0
BACK PAIN: 1
NECK PAIN: 0
SPEECH CHANGE: 0
CHILLS: 0
PALPITATIONS: 0

## 2019-05-06 NOTE — PROGRESS NOTES
Subjective:      Yola Campuzano is a 59 y.o. female who presents with Results (xray)            Patient 59-year-old female here to follow-up on her recent x-rays of her hip and her back.  The results of her x-rays are as follows:    1.  No radiographic evidence of acute traumatic injury.    2.  Findings are consistent with mild osteoarthritis.    No compression deformity or acute fracture is identified.  FINDINGS ARE CONSISTENT WITH MILD TO MODERATE DEGENERATIVE DISC DISEASE AND FACET ARTHROPATHY.    We will have her continue to use her Motrin as directed.  Patient states that her Motrin has been helping, but this caused her a little bit of drowsiness after taking it.  She is also been taking her Neurontin at 100 mg 3 times daily.  Since her Motrin causes a slight bit of drowsiness, she has been cautioned that Neurontin can do the same.  We will have her increase her Neurontin to 300 mg 3 times a day.  We will also refer to pain management.  She has been seeing a physical therapist and feels that it has been helping her a slight bit.  But she still has occasional breakthrough pain.  We will continue to follow.    She will also like to have her mammogram as well as fit screen reordered today.  She has been keeping up with these exams and has not had any abnormal findings on her previous exams.  We will have her continue to do these tests, and discussed the importance of screening for colon and breast cancers.  We will continue to follow.    She will also need her vitamin D supplement refilled today.  We will have her continue using it as directed.  We will continue to follow.    Since she has not had any recent health maintenance lab work will order lab work for her today to get done prior to her next appointment.  We will continue to follow.     Current medications, allergies, and problem list reviewed with patient and updated in EPIC.          Review of Systems   Constitutional: Negative for chills and fever.  "  HENT: Negative for hearing loss and tinnitus.    Respiratory: Negative for cough, sputum production and shortness of breath.    Cardiovascular: Negative for chest pain and palpitations.   Gastrointestinal: Negative for abdominal pain, nausea and vomiting.   Musculoskeletal: Positive for back pain and joint pain. Negative for neck pain.   Skin: Negative for rash.   Neurological: Positive for tingling. Negative for tremors, sensory change, speech change, focal weakness and headaches.   Psychiatric/Behavioral: Negative.           Objective:     /90 (BP Location: Left arm, Patient Position: Sitting)   Pulse 76   Temp 36.4 °C (97.6 °F)   Resp 14   Ht 1.626 m (5' 4\")   Wt 77.1 kg (170 lb)   LMP 12/18/2009   SpO2 95%   BMI 29.18 kg/m²      Physical Exam   Constitutional: She is oriented to person, place, and time. She appears well-developed and well-nourished.   BMI 29.18   HENT:   Head: Normocephalic and atraumatic.   Neck: Normal range of motion. Neck supple.   Cardiovascular: Normal rate, regular rhythm and normal heart sounds.  Exam reveals no friction rub.    No murmur heard.  Pulmonary/Chest: Effort normal and breath sounds normal. No respiratory distress. She has no wheezes. She has no rales.   Abdominal: Soft. Bowel sounds are normal. She exhibits no distension. There is no tenderness.   Neurological: She is alert and oriented to person, place, and time.   Skin: Skin is warm and dry.   Psychiatric: She has a normal mood and affect. Her behavior is normal.   Nursing note and vitals reviewed.              Assessment/Plan:     1. Screening mammogram, encounter for  Will order an screening mammogram and will continue to follow.  - MA-SCREENING DIGITAL MAMMO; Future    2. Colon cancer screening  Will order a FIT and will continue to follow.  - OCCULT BLOOD FECES IMMUNOASSAY; Future    3. Bilateral low back pain with sciatica, sciatica laterality unspecified, unspecified chronicity  Reviewed the results " of her recent xrays. Will have her continue to use her medications as directed and will refer to pain management. Will continue to follow.  - REFERRAL TO PAIN CLINIC    4. Dental caries  Will have her start amoxicillin and follow up with dental for assistance in management. Will continue to follow.  - amoxicillin (AMOXIL) 500 MG Cap; Take 1 Cap by mouth 3 times a day.  Dispense: 30 Cap; Refill: 0    5. Routine health maintenance  Will order blood work to recheck her labs and will continue to follow.  - Comp Metabolic Panel; Future  - Lipid Profile; Future  - TSH WITH REFLEX TO FT4; Future  - VITAMIN D,25 HYDROXY; Future  - CBC WITH DIFFERENTIAL; Future

## 2019-06-03 ENCOUNTER — HOSPITAL ENCOUNTER (OUTPATIENT)
Dept: RADIOLOGY | Facility: MEDICAL CENTER | Age: 59
End: 2019-06-03
Attending: FAMILY MEDICINE
Payer: MEDICAID

## 2019-06-03 DIAGNOSIS — Z12.31 SCREENING MAMMOGRAM, ENCOUNTER FOR: ICD-10-CM

## 2019-06-03 PROCEDURE — 77063 BREAST TOMOSYNTHESIS BI: CPT

## 2019-06-07 ENCOUNTER — TELEPHONE (OUTPATIENT)
Dept: MEDICAL GROUP | Facility: MEDICAL CENTER | Age: 59
End: 2019-06-07

## 2019-06-07 NOTE — TELEPHONE ENCOUNTER
1. Name: Yola      Call Back Number: [phone]  Patient approves a detailed voicemail message: yes    2. APPLICATION FOR RTC ACCESS paperwork received from patient requiring provider signature. Patient has an upcomming appointment to have paperwork done on 6/12/19.    3. Paperwork placed in MA folder/basket to process.

## 2019-06-12 ENCOUNTER — OFFICE VISIT (OUTPATIENT)
Dept: MEDICAL GROUP | Facility: MEDICAL CENTER | Age: 59
End: 2019-06-12
Attending: FAMILY MEDICINE
Payer: MEDICAID

## 2019-06-12 VITALS
WEIGHT: 169 LBS | OXYGEN SATURATION: 95 % | DIASTOLIC BLOOD PRESSURE: 78 MMHG | HEART RATE: 78 BPM | BODY MASS INDEX: 28.85 KG/M2 | HEIGHT: 64 IN | RESPIRATION RATE: 16 BRPM | SYSTOLIC BLOOD PRESSURE: 122 MMHG | TEMPERATURE: 96.7 F

## 2019-06-12 DIAGNOSIS — M25.551 PAIN OF BOTH HIP JOINTS: ICD-10-CM

## 2019-06-12 DIAGNOSIS — M25.552 PAIN OF BOTH HIP JOINTS: ICD-10-CM

## 2019-06-12 DIAGNOSIS — G56.01 CARPAL TUNNEL SYNDROME ON RIGHT: ICD-10-CM

## 2019-06-12 DIAGNOSIS — M54.40 BILATERAL LOW BACK PAIN WITH SCIATICA, SCIATICA LATERALITY UNSPECIFIED, UNSPECIFIED CHRONICITY: ICD-10-CM

## 2019-06-12 PROCEDURE — 99212 OFFICE O/P EST SF 10 MIN: CPT | Performed by: FAMILY MEDICINE

## 2019-06-12 PROCEDURE — 99214 OFFICE O/P EST MOD 30 MIN: CPT | Performed by: FAMILY MEDICINE

## 2019-06-12 ASSESSMENT — ENCOUNTER SYMPTOMS
HEADACHES: 0
SPUTUM PRODUCTION: 0
FEVER: 0
SENSORY CHANGE: 0
TINGLING: 1
ABDOMINAL PAIN: 0
COUGH: 0
BACK PAIN: 1
VOMITING: 0
TREMORS: 0
PALPITATIONS: 0
SHORTNESS OF BREATH: 0
SPEECH CHANGE: 0
FOCAL WEAKNESS: 0
NAUSEA: 0
CHILLS: 0
PSYCHIATRIC NEGATIVE: 1

## 2019-06-12 NOTE — PROGRESS NOTES
"Subjective:      Yola Campuzano is a 59 y.o. female who presents with Other (rtc paperwork)            Patient 59-year-old female here for follow-up of her chronic low back pain, chronic hip pain and neuropathy in both hands.    Patient today has a form to be filled out for [employer] for assistance with transport to and from her doctor appointments.  She states that she has difficulty using public transportation due to the inability to walk to but stops if they are far from the location she is traveling to.  Her form for RTC access will be filled out today.    She is also requesting a cane for assistance in ambulating.  An order for an adjustable cane will be written today.  We will continue to follow.    She is also following up with pain management who requested a referral be made to physical therapy.  A physical therapy order will be made today.  The order will be written out for 3 times a week for 6 weeks.  She will continue take her other medications as previously directed.  We will continue to follow.     Current medications, allergies, and problem list reviewed with patient and updated in EPIC.          Review of Systems   Constitutional: Negative for chills and fever.   HENT: Negative for hearing loss and tinnitus.    Respiratory: Negative for cough, sputum production and shortness of breath.    Cardiovascular: Negative for chest pain and palpitations.   Gastrointestinal: Negative for abdominal pain, nausea and vomiting.   Musculoskeletal: Positive for back pain and joint pain.   Neurological: Positive for tingling. Negative for tremors, sensory change, speech change, focal weakness and headaches.   Psychiatric/Behavioral: Negative.           Objective:     /78 (BP Location: Left arm, Patient Position: Sitting)   Pulse 78   Temp 35.9 °C (96.7 °F)   Resp 16   Ht 1.626 m (5' 4\")   Wt 76.7 kg (169 lb)   LMP 12/18/2009   SpO2 95%   BMI 29.01 kg/m²      Physical Exam   Constitutional: She is " oriented to person, place, and time.   BMI 29.01   HENT:   Head: Normocephalic and atraumatic.   Cardiovascular: Normal rate, regular rhythm and normal heart sounds.  Exam reveals no friction rub.    No murmur heard.  Pulmonary/Chest: Effort normal and breath sounds normal. No respiratory distress. She has no wheezes. She has no rales.   Abdominal: Soft. Bowel sounds are normal. She exhibits no distension. There is no tenderness.   Musculoskeletal: She exhibits tenderness. She exhibits no edema.   Decreased range of motion of back in flexion and hips in flexion and extension.   Neurological: She is alert and oriented to person, place, and time.   Skin: Skin is warm and dry.   Psychiatric: She has a normal mood and affect. Her behavior is normal.   Nursing note and vitals reviewed.              Assessment/Plan:     1. Carpal tunnel syndrome on right  We will have her referred to physical therapy as well as continue to follow-up with pain management take her medications as directed.  A form was also filled out for [employer] for assistance with transportation to and from her medical appointments.  We will continue to follow.  - REFERRAL TO PHYSICAL THERAPY Reason for Therapy: Eval/Treat/Report  - Misc. Devices Misc; Adjustable cane to use as directed  Dispense: 1 Device; Refill: 0    2. Bilateral low back pain with sciatica, sciatica laterality unspecified, unspecified chronicity  See above plan.  - REFERRAL TO PHYSICAL THERAPY Reason for Therapy: Eval/Treat/Report  - Misc. Devices Misc; Adjustable cane to use as directed  Dispense: 1 Device; Refill: 0    3. Pain of both hip joints  See above plan.  - REFERRAL TO PHYSICAL THERAPY Reason for Therapy: Eval/Treat/Report  - Misc. Devices Misc; Adjustable cane to use as directed  Dispense: 1 Device; Refill: 0

## 2019-07-24 ENCOUNTER — PHYSICAL THERAPY (OUTPATIENT)
Dept: PHYSICAL THERAPY | Facility: REHABILITATION | Age: 59
End: 2019-07-24
Attending: FAMILY MEDICINE
Payer: MEDICAID

## 2019-07-24 DIAGNOSIS — M25.552 PAIN OF BOTH HIP JOINTS: ICD-10-CM

## 2019-07-24 DIAGNOSIS — M54.40 BILATERAL LOW BACK PAIN WITH SCIATICA, SCIATICA LATERALITY UNSPECIFIED, UNSPECIFIED CHRONICITY: ICD-10-CM

## 2019-07-24 DIAGNOSIS — M25.551 PAIN OF BOTH HIP JOINTS: ICD-10-CM

## 2019-07-24 PROCEDURE — 97162 PT EVAL MOD COMPLEX 30 MIN: CPT

## 2019-07-24 SDOH — ECONOMIC STABILITY: GENERAL: QUALITY OF LIFE: GOOD

## 2019-07-24 ASSESSMENT — ENCOUNTER SYMPTOMS
EXACERBATED BY: PROLONGED SITTING
PAIN SCALE: 7
PAIN TIMING: INTERMITTENT
ALLEVIATING FACTORS: STRETCHING
EXACERBATED BY: PROLONGED STANDING
PAIN TIMING: IN THE MORNING
ALLEVIATING FACTORS: PAIN MEDICATION
PAIN SCALE AT HIGHEST: 10
PAIN SCALE AT LOWEST: 0
EXACERBATED BY: LIFTING
EXACERBATED BY: STAIR CLIMBING

## 2019-07-24 NOTE — OP THERAPY EVALUATION
"  Outpatient Physical Therapy  INITIAL EVALUATION    [facility] Physical Therapy [place] [address]  [address]  [facility] [address]  Phone:  [phone]  Fax:  [phone]    Date of Evaluation: 07/24/2019    Patient: Yola Campuzano  YOB: 1960  MRN: [id number]     Referring Provider: Kaden Bowman M.D.  [address] [facility] [address] [facility]  [address]  [place] [address]   Referring Diagnosis Carpal tunnel syndrome on right [G56.01];Bilateral low back pain with sciatica, sciatica laterality unspecified, unspecified chronicity [M54.40];Pain of both hip joints [M25.551, M25.552]     Time Calculation  Start time: 0858  Stop time: 1000 Time Calculation (min): 62 minutes     Physical Therapy Occurrence Codes    Date of onset of impairment:  7/24/09   Date physical therapy care plan established or reviewed:  7/24/19   Date physical therapy treatment started:  7/24/19          Chief Complaint: Back Problem    Visit Diagnoses     ICD-10-CM   1. Bilateral low back pain with sciatica, sciatica laterality unspecified, unspecified chronicity M54.40   2. Pain of both hip joints M25.551    M25.552         Subjective:   History of Present Illness:     Date of onset:  7/24/2009    Mechanism of injury:  Patient is a 59 year old female that was referred for suspected carpal tunnel, low back pain, and bilateral hip discomfort. Due to multiple treatment areas, patient chose to focus on back/hip pain as this is her primary pain area.    Patient reports pain in low back for approximately 10 years, which she feels began when she was working at [employer] and lifting heavy objects. Patient states that her back feels like it is \"going to snap in half\". Describes bilateral back pain that is localized centrally. In addition to back pain, patient complains of left hip pain (that intermittently pops) and left ankle pain (history of broken left ankle in 1991). When pain is the worst, she feels like she cannot walk or put weight on it. Reports that it " takes about 2 hours to get moving in the morning due to back pain; this has prevented her from getting a job.     Previous exercise routine included walking approximately 3 miles, but has reduced this activity due to pain and change in lifestyle     Quality of life:  Good  Prior level of function:  Independent  Headaches:  tension headaches  Headache comments: Recent   Ear problems: none  Sleep disturbance:  Difficulty falling asleep, non-restful sleep and interrupted sleep  Pain:     Current pain ratin    At best pain ratin    At worst pain rating:  10    Location:  Lumbar spine and left hip     Quality: feels like the back is going to break in half.    Pain timing:  Intermittent and in the morning    Relieving factors:  Pain medication and stretching    Aggravating factors:  Prolonged standing, stair climbing, lifting and prolonged sitting    Progression:  Worsening  Social Support:     Lives in:  Apartment (Second floor)    Lives with:  Alone  Diagnostic Tests:     X-ray: abnormal      Diagnostic Tests Comments:   Bilateral Hip X-Ray:  1.  No radiographic evidence of acute traumatic injury.  2.  Findings are consistent with mild osteoarthritis.     Lumbar X-Ray   Vertebral body height is well maintained.  There is no evidence of fracture. Vertebral alignment is normal.  There is mild to moderate degenerative disc disease and facet arthropathy again noted which is most prominent at L4-S1. No significant change.  Treatments:     None      Treatment Comments:  Pain management center for testing (EMG for numbness in right wrist).   Activities of Daily Living:     Patient reported ADL status: Independent with all ADLs and IADLs. Does use grab bars and non-skid mat for safety in shower.   Patient Goals:     Other patient goals:  Return to walking routine. Prevent surgery on the back. Be able to walk to bus stop to allow for transport to work.       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   • Hypertension    • Low back pain 8/14/2012   • Numbness and tingling in right hand 8/14/2012   • Osteopenia 8/29/2012   • Seizure (HCC)     related to ETOH WD   • Skin tag 8/4/2015   • Substance abuse (HCC)    • Tobacco dependence 8/14/2012   • Tuberculosis     was dx in a rehab ceneter in 2006   • Urinary tract infection, site not specified    • Vaginal discharge 8/14/2012   • Vitamin d deficiency 9/25/2012     Past Surgical History:   Procedure Laterality Date   • OPEN REDUCTION  1991    left ankle     Social History   Substance Use Topics   • Smoking status: Current Every Day Smoker     Packs/day: 0.50     Years: 35.00     Types: Cigarettes   • Smokeless tobacco: Never Used   • Alcohol use 6.0 oz/week     12 Cans of beer per week     Family and Occupational History     Social History   • Marital status: Single     Spouse name: N/A   • Number of children: N/A   • Years of education: N/A       Objective     Postural Observations  Seated posture: fair  Standing posture: fair    Additional Postural Observation Details  Forward head and rounded shoulders. Sits with slumped posture.     Hip Screen   Hip joint mobility within functional limits with the following exceptions: Stands with slight flexion at hips; decreased ROM into bilateral hip extension.     Neurological Testing     Reflexes   Left   Patellar (L4): normal (2+)  Achilles (S1): trace (1+)    Right   Patellar (L4): trace (1+)  Achilles (S1): trace (1+)    Myotome testing   Lumbar (left)   L2 (hip flexors): 5  L3 (knee extensors): 5  L4 (ankle dorsiflexors): 5  L5 (great toe extension): 5  S1 (ankle plantar flexors): 5    Lumbar (right)   L2 (hip flexors): 4  L3 (knee extensors): 5  L4 (ankle dorsiflexors): 5  L5  (great toe extension): 5  S1 (ankle plantar flexors): 5    Dermatome testing   Lumbar (left)   All left lumbar dermatomes intact    Lumbar (right)   All right lumbar dermatomes intact    Palpation     Additional Palpation Details  No complaints of pain with palpation over lumbar paraspinals or Q bilaterally.     Tenderness     Left Hip   Tenderness in the greater trochanter.  No tenderness in the ASIS, PSIS and iliac crest.     Right Hip   Tenderness in the PSIS. No tenderness in the ASIS, greater trochanter and iliac crest.     Active Range of Motion     Lumbar   Flexion: within functional limits  Extension: decreased  Left lateral flexion: decreased (with central pain )  Right lateral flexion: within functional limits  Left rotation: decreased (with central pain )  Right rotation: decreased    Joint Play   Spine     Central PA [place]        T12: WFL       L1: WFL       L2: WFL       L3: WFL       L4: WFL       L5: WFL       S1: WFL    Unilateral PA Glide (left)        T12: WFL       L1: WFL       L2: WFL       L3: WFL       L4: WFL       L5: WFL       S1: WFL    Unilateral PA Glide (right)        T12: WFL       L1: WFL       L2: WFL       L3: WFL       L4: WFL       L5: WFL       S1: WFL        Strength:      Left Hip   Planes of Motion   Flexion: 5  Extension: 4  Abduction: 4  Adduction: 4    Right Hip   Planes of Motion   Flexion: 4  Extension: 4  Abduction: 4  Adduction: 4    Left Ankle/Foot   Dorsiflexion: 5  Inversion: 4+  Eversion: 4+  Great toe flexion: 5    Right Ankle/Foot   Dorsiflexion: 5  Inversion: 5  Eversion: 5  Great toe flexion: 5    Tests       Lumbar spine (left)      Positive slump.   Lumbar spine (right)     Negative slump.     Right Pelvic Girdle/Sacrum   Positive: sacral rotation.     Left Hip   Negative SI compression and SI distraction.     Right Hip   Positive SI distraction.     Additional Tests Details  Positive lumbar compression bilaterally (L>R)     Right innominate posteriorly  rotated - improved leg length with MET engaging right hamstrings in supine.   Ambulation     Observational Gait   Gait: antalgic   Decreased walking speed, left stance time, right swing time and right step length.   Left foot contact pattern: foot flat  Right foot contact pattern: foot flat        Therapeutic Exercises (CPT 84291):     1. Hooklying hip abduction, 2 x 10, Pink theraband; added to HEP    2. Hooklying hip adduction, 2 x 10, Pink theraband; added to HEP.       Time-based treatments/modalities:          Assessment, Response and Plan:   Impairments: abnormal or restricted ROM, activity intolerance, impaired functional mobility, impaired physical strength, lacks appropriate home exercise program and pain with function    Assessment details:  Patient is a pleasant 59 year old female who was referred for low back and bilateral hip pain (L>R). She present with the following impairments: limited lumbar and hip ROM, decreased trunk and hip strength, positive slump on the left, and posteriorly rotated right innominate.She responded well to muscle energy techniques (MET) to address this SI joint dysfunction. Patient's gait has also been altered to compensate for left hip and ankle impairments, straining the low back. Based on these evaluation findings, patient would benefit from skilled physical therapy to increase trunk and hip stability, improve gait mechanics, and maintain SI joint alignment. This will allow for decreased pain with functional activities and allow patient to return to walking/access to work.   Barriers to therapy:  None  Prognosis: good    Goals:   Short Term Goals:   1. Patient will be instructed in self-MET to address right posteriorly rotated innominate to assist with proper spinal/hip alignment to decrease pain.   2. Independent with HEP for trunk and hip stabilization and stretching.   Short term goal time span:  2-4 weeks      Long Term Goals:    1. Decrease level of disability due to  back pain based on Davin Ivan Low Back Pain and Disability Questionnaire to 20% or less.   2. Patient will be able to return to walking up to 2 miles daily without significant increase in pain levels to allow for return to leisure activities and access to public transportation.   Long term goal time span:  6-8 weeks    Plan:   Therapy options:  Physical therapy treatment to continue  Planned therapy interventions:  E Stim Unattended (CPT 79678), Therapeutic Exercise (CPT 81262) and Gait Training (CPT 09175)  Frequency:  2x week  Duration in weeks:  8  Duration in visits:  11  Discussed with:  Patient  Plan details:  Follow up on hip abduction/adduction strengthening exercises. Re-assess SI joint alignment. Instruct in basic trunk stabilization exercises.       Functional Limitations and Severity Modifiers  Davin Ivan Low Back Pain and Disability Score: 45.83               Referring provider co-signature:  I have reviewed this plan of care and my co-signature certifies the need for services.  Certification Dates:   From 7/24/2019     To 9/20/2019    Physician Signature: ________________________________ Date: ______________

## 2019-07-31 ENCOUNTER — APPOINTMENT (OUTPATIENT)
Dept: PHYSICAL THERAPY | Facility: REHABILITATION | Age: 59
End: 2019-07-31
Attending: FAMILY MEDICINE
Payer: MEDICAID

## 2019-08-02 ENCOUNTER — APPOINTMENT (OUTPATIENT)
Dept: PHYSICAL THERAPY | Facility: REHABILITATION | Age: 59
End: 2019-08-02
Attending: FAMILY MEDICINE
Payer: MEDICAID

## 2019-08-09 ENCOUNTER — APPOINTMENT (OUTPATIENT)
Dept: PHYSICAL THERAPY | Facility: REHABILITATION | Age: 59
End: 2019-08-09
Attending: FAMILY MEDICINE
Payer: MEDICAID

## 2019-08-14 ENCOUNTER — PHYSICAL THERAPY (OUTPATIENT)
Dept: PHYSICAL THERAPY | Facility: REHABILITATION | Age: 59
End: 2019-08-14
Attending: FAMILY MEDICINE
Payer: MEDICAID

## 2019-08-14 DIAGNOSIS — M54.40 BILATERAL LOW BACK PAIN WITH SCIATICA, SCIATICA LATERALITY UNSPECIFIED, UNSPECIFIED CHRONICITY: ICD-10-CM

## 2019-08-14 PROCEDURE — 97110 THERAPEUTIC EXERCISES: CPT

## 2019-08-14 NOTE — OP THERAPY DAILY TREATMENT
Outpatient Physical Therapy  DAILY TREATMENT     [facility] Physical [address]  [address]  [place] [address]  Phone:  [phone]  Fax:  [phone]    Date: 08/14/2019    Patient: Yola Campuzano  YOB: 1960  MRN: [id number]     Time Calculation  Start time: 1404  Stop time: 1430 Time Calculation (min): 26 minutes       Chief Complaint: Back Problem    Visit #: 2    SUBJECTIVE:  Patient reports no changes in function or increase in pain. No questions regarding previously provided exercises.     OBJECTIVE:  Current objective measures:           Therapeutic Exercises (CPT 90053):     1. Trunk rotations, 3 x 10 seconds bilaterally, Added to HEP    2. Figure 4 stretch, 2 x 30 seconds bilaterally, Added to HEP    3. Seated hamstring stretch, 2 x 30 seconds bilaterally, Added to HEP    4. TrA activation, 10 x 5 seconds with tactile cues, Added to HEP. Patient had difficulty with  TrA, pelvic floor, and gluts    5. Bridges, 2 x 10, Added to HEP      Therapeutic Exercise Summary: Checked SI joint/pelvic alignment and noted equal leg length       Time-based treatments/modalities:  Therapeutic exercise minutes (CPT 78194): 26 minutes       Pain rating before treatment: 1  Pain rating after treatment: 1    ASSESSMENT:   Response to treatment: Patient tolerated all basic stretching and strengthening well. Had significant difficulty with activating her transverse abdominals, which suggests that she has poor stability surrounding her lumbar spine. Will continue to address.    PLAN/RECOMMENDATIONS:   Plan for treatment: TrA activation with BP cuff for feedback - BKFO, marching, etc. .  Planned interventions for next visit: continue with current treatment.

## 2019-08-16 ENCOUNTER — APPOINTMENT (OUTPATIENT)
Dept: PHYSICAL THERAPY | Facility: REHABILITATION | Age: 59
End: 2019-08-16
Attending: FAMILY MEDICINE
Payer: MEDICAID

## 2019-08-21 ENCOUNTER — PHYSICAL THERAPY (OUTPATIENT)
Dept: PHYSICAL THERAPY | Facility: REHABILITATION | Age: 59
End: 2019-08-21
Attending: FAMILY MEDICINE
Payer: MEDICAID

## 2019-08-21 DIAGNOSIS — M54.40 BILATERAL LOW BACK PAIN WITH SCIATICA, SCIATICA LATERALITY UNSPECIFIED, UNSPECIFIED CHRONICITY: ICD-10-CM

## 2019-08-21 PROCEDURE — 97110 THERAPEUTIC EXERCISES: CPT

## 2019-08-21 NOTE — OP THERAPY DAILY TREATMENT
Outpatient Physical Therapy  DAILY TREATMENT     [address]  [address]  [place] [address]  Phone:  [phone]  Fax:  [phone]    Date: 08/21/2019    Patient: Yola Campuzano  YOB: 1960  MRN: [id number]     Time Calculation  Start time: 1135  Stop time: 1210 Time Calculation (min): 35 minutes       Chief Complaint: Back Problem    Visit #: 3    SUBJECTIVE:  Patient reports no questions regarding HEP but has noted some increased popping in her left hip, especially with bed mobility. Does not feel that this is associated with the stretching.     OBJECTIVE:  Current objective measures:           Therapeutic Exercises (CPT 59708):     1. TrA in hooklying with BP cuff, Performed to improve awareness of TrA activation    2. TrA in hooklying - marching , 2 x 10    3. TrA in hooklying - BKFO, 2 x 10    4. Seated on ball - marching, 2 x 10, Good TrA activation with minimal ball movement noted    5. Seated EOM - TrA activation, 10 x 5 second hold, Added to HEP with progression toward counting out loud to encourage breathing.       Therapeutic Exercise Summary: A great deal of education provided on location of TrA muscles, how to engage these muscles, and why activation is important.       Time-based treatments/modalities:  Therapeutic exercise minutes (CPT 13110): 35 minutes       ASSESSMENT:   Response to treatment: Patient had difficulty with understanding how to engage TrA. However, with practice and altered cues, able to find these muscles sitting EOM.     PLAN/RECOMMENDATIONS:   Plan for treatment: Follow up on TrA activation. Perform standing trunk strengthening, quadruped activities. .  Planned interventions for next visit: continue with current treatment.

## 2019-08-23 ENCOUNTER — PHYSICAL THERAPY (OUTPATIENT)
Dept: PHYSICAL THERAPY | Facility: REHABILITATION | Age: 59
End: 2019-08-23
Attending: FAMILY MEDICINE
Payer: MEDICAID

## 2019-08-23 DIAGNOSIS — M25.552 PAIN OF BOTH HIP JOINTS: ICD-10-CM

## 2019-08-23 DIAGNOSIS — M25.551 PAIN OF BOTH HIP JOINTS: ICD-10-CM

## 2019-08-23 DIAGNOSIS — M54.40 BILATERAL LOW BACK PAIN WITH SCIATICA, SCIATICA LATERALITY UNSPECIFIED, UNSPECIFIED CHRONICITY: ICD-10-CM

## 2019-08-23 PROCEDURE — 97110 THERAPEUTIC EXERCISES: CPT

## 2019-08-23 NOTE — OP THERAPY DAILY TREATMENT
Outpatient Physical Therapy  DAILY TREATMENT     [facility] Physical [address]  [address]  [place] [address]  Phone:  [phone]  Fax:  [phone]    Date: 08/23/2019    Patient: Yola Campuzano  YOB: 1960  MRN: [id number]     Time Calculation  Start time: 0830  Stop time: 0900 Time Calculation (min): 30 minutes       Chief Complaint: Back Problem    Visit #: 4    SUBJECTIVE:  Patient reports that she was very sore yesterday, resulting in soreness waking her up in the night. Describes soreness as throughout her body and not just her core muscles and therefore thinks that soreness was associated with helping a friend move vs. PT session. Additionally, patient complains of increasing left hip popping with turning over in bed and going up stairs.     OBJECTIVE:  Current objective measures:     MODESTA resulted in pain but no popping, FADIR negative, SCOUR negative, and no pain with palpation.       Therapeutic Exercises (CPT 37638):     1. Performed stair transfers, 1 x 15 stairs, Minimal popping with ascent, no popping with descent.     2. Bridges, 2 x 10    3. SLR, 2 x 10 bilaterally, Added to HEP    4. Sidelying hip abduction, 2 x 10 bilaterally, Added to HEP     5. Core Walk Outs, x 5 bilaterally, [place] theraband.     6. Reviewed stretching routine, Removed Figure 4 stretch      Time-based treatments/modalities:  Therapeutic exercise minutes (CPT 70697): 30 minutes         ASSESSMENT:   Response to treatment: Popping in left hip is possibly due to improved flexibility allowing for more hip mobility or increased joint shear with figure 4 stretching. Will continue to monitor and refer patient back to MD if pain worsens. Also worked to provide patient with more hip strengthening exercises to increase stability.     PLAN/RECOMMENDATIONS:   Plan for treatment: Follow up on soreness and hip popping. Continue hip and trunk stabilization. Trial NuStep?.  Planned interventions  for next visit: continue with current treatment.

## 2019-08-27 RX ORDER — GABAPENTIN 300 MG/1
CAPSULE ORAL
Qty: 90 CAP | Refills: 2 | Status: SHIPPED | OUTPATIENT
Start: 2019-08-27 | End: 2019-09-03 | Stop reason: SDUPTHER

## 2019-08-28 ENCOUNTER — PHYSICAL THERAPY (OUTPATIENT)
Dept: PHYSICAL THERAPY | Facility: REHABILITATION | Age: 59
End: 2019-08-28
Attending: FAMILY MEDICINE
Payer: MEDICAID

## 2019-08-28 DIAGNOSIS — M54.40 BILATERAL LOW BACK PAIN WITH SCIATICA, SCIATICA LATERALITY UNSPECIFIED, UNSPECIFIED CHRONICITY: ICD-10-CM

## 2019-08-28 DIAGNOSIS — M25.552 PAIN OF BOTH HIP JOINTS: ICD-10-CM

## 2019-08-28 DIAGNOSIS — M25.551 PAIN OF BOTH HIP JOINTS: ICD-10-CM

## 2019-08-28 PROCEDURE — 97110 THERAPEUTIC EXERCISES: CPT

## 2019-08-28 NOTE — OP THERAPY DAILY TREATMENT
Outpatient Physical Therapy  DAILY TREATMENT     [facility] Physical [address]  [address]  [place] [address]  Phone:  [phone]  Fax:  [phone]    Date: 08/28/2019    Patient: Yola Campuzano  YOB: 1960  MRN: [id number]     Time Calculation  Start time: 0900  Stop time: 0925 Time Calculation (min): 25 minutes       Chief Complaint: Back Problem    Visit #: 5    SUBJECTIVE:  Patient reports that she moved quickly yesterday and felt a pop in the left hip. Since then she has not had as much pain with stairs. Does have questions about sidelying hip abduction exercise.      OBJECTIVE:  Current objective measures:           Therapeutic Exercises (CPT 12369):     1. NuStep, 5 minutes, L2     2. Seated on ball- marching, 2 x 10    3. Seated on ball-rows, 2 x 10, Pink theraband; added to HEP    4. Seated on ball - pull backs , 2 x 10, Pink theraband; added to HEP    5. Sidelying hip abduction, 2 x 10 bilaterally , Reviewed as part of HEP      Time-based treatments/modalities:  Therapeutic exercise minutes (CPT 74805): 25 minutes         ASSESSMENT:   Response to treatment: Patient tolerated all strengthening exercises well and demonstrated good core control during sitting on ball activities.     PLAN/RECOMMENDATIONS:   Plan for treatment: Review HEP to ensure patient has sufficient exercises for week break. Complete form roller activities. .  Planned interventions for next visit: continue with current treatment.

## 2019-08-30 ENCOUNTER — PHYSICAL THERAPY (OUTPATIENT)
Dept: PHYSICAL THERAPY | Facility: REHABILITATION | Age: 59
End: 2019-08-30
Attending: FAMILY MEDICINE
Payer: MEDICAID

## 2019-08-30 DIAGNOSIS — M25.551 PAIN OF BOTH HIP JOINTS: ICD-10-CM

## 2019-08-30 DIAGNOSIS — M54.40 BILATERAL LOW BACK PAIN WITH SCIATICA, SCIATICA LATERALITY UNSPECIFIED, UNSPECIFIED CHRONICITY: ICD-10-CM

## 2019-08-30 DIAGNOSIS — M25.552 PAIN OF BOTH HIP JOINTS: ICD-10-CM

## 2019-08-30 PROCEDURE — 97110 THERAPEUTIC EXERCISES: CPT

## 2019-08-30 NOTE — OP THERAPY DAILY TREATMENT
Outpatient Physical Therapy  DAILY TREATMENT     [facility] Physical [address]  [address]  [place] [address]  Phone:  [phone]  Fax:  [phone]    Date: 08/30/2019    Patient: Yola Campuzano  YOB: 1960  MRN: [id number]     Time Calculation  Start time: 0900  Stop time: 0930 Time Calculation (min): 30 minutes       Chief Complaint: Back Problem    Visit #: 6    SUBJECTIVE:  Patient reports soreness following last treatment session (possibly due to NuStep), but resolved the next day after moving around. Feels like therapy has been beneficial, but continues to notice difficulty with getting up after prolonged sitting.     OBJECTIVE:  Current objective measures:           Therapeutic Exercises (CPT 70805):     1. NuStep, 5 minutes, L0    2. Wall squats , 2 x 10    3. Foam roller - marching , 2 x 10    4. Foam roller - opposite arm and leg , 2 x 10    5. Core Walk , x 5 each direction , [place] theraband      Time-based treatments/modalities:  Therapeutic exercise minutes (CPT 31270): 30 minutes       ASSESSMENT:   Response to treatment: Tolerated all strengthening exercises well but continues to require frequent cues for abdominal activation in varying positions.      PLAN/RECOMMENDATIONS:   Plan for treatment: Follow up on tolerance to NuStep at lower level. Continue trunk stabilization. .  Planned interventions for next visit: continue with current treatment.

## 2019-09-03 ENCOUNTER — PHYSICAL THERAPY (OUTPATIENT)
Dept: PHYSICAL THERAPY | Facility: REHABILITATION | Age: 59
End: 2019-09-03
Attending: FAMILY MEDICINE
Payer: MEDICAID

## 2019-09-03 ENCOUNTER — OFFICE VISIT (OUTPATIENT)
Dept: MEDICAL GROUP | Facility: MEDICAL CENTER | Age: 59
End: 2019-09-03
Attending: FAMILY MEDICINE
Payer: MEDICAID

## 2019-09-03 VITALS
WEIGHT: 179 LBS | SYSTOLIC BLOOD PRESSURE: 120 MMHG | OXYGEN SATURATION: 96 % | RESPIRATION RATE: 16 BRPM | DIASTOLIC BLOOD PRESSURE: 78 MMHG | HEART RATE: 64 BPM | HEIGHT: 64 IN | TEMPERATURE: 98.3 F | BODY MASS INDEX: 30.56 KG/M2

## 2019-09-03 DIAGNOSIS — R19.5 POSITIVE FIT (FECAL IMMUNOCHEMICAL TEST): ICD-10-CM

## 2019-09-03 DIAGNOSIS — M25.551 PAIN OF BOTH HIP JOINTS: ICD-10-CM

## 2019-09-03 DIAGNOSIS — K21.9 GASTROESOPHAGEAL REFLUX DISEASE, ESOPHAGITIS PRESENCE NOT SPECIFIED: ICD-10-CM

## 2019-09-03 DIAGNOSIS — M85.80 OSTEOPENIA, UNSPECIFIED LOCATION: ICD-10-CM

## 2019-09-03 DIAGNOSIS — M25.552 PAIN OF BOTH HIP JOINTS: ICD-10-CM

## 2019-09-03 DIAGNOSIS — M54.40 BILATERAL LOW BACK PAIN WITH SCIATICA, SCIATICA LATERALITY UNSPECIFIED, UNSPECIFIED CHRONICITY: ICD-10-CM

## 2019-09-03 PROCEDURE — 97110 THERAPEUTIC EXERCISES: CPT

## 2019-09-03 PROCEDURE — 99204 OFFICE O/P NEW MOD 45 MIN: CPT | Performed by: INTERNAL MEDICINE

## 2019-09-03 PROCEDURE — 99213 OFFICE O/P EST LOW 20 MIN: CPT | Performed by: FAMILY MEDICINE

## 2019-09-03 RX ORDER — RANITIDINE 150 MG/1
150 TABLET ORAL 2 TIMES DAILY PRN
Qty: 30 TAB | Refills: 2 | Status: SHIPPED
Start: 2019-09-03 | End: 2020-02-12

## 2019-09-03 RX ORDER — GABAPENTIN 300 MG/1
CAPSULE ORAL
Qty: 90 CAP | Refills: 5 | Status: SHIPPED | OUTPATIENT
Start: 2019-09-03 | End: 2020-06-08

## 2019-09-03 RX ORDER — ERGOCALCIFEROL 1.25 MG/1
50000 CAPSULE ORAL
Qty: 3 CAP | Refills: 3 | Status: SHIPPED | OUTPATIENT
Start: 2019-09-03 | End: 2020-08-03 | Stop reason: SDUPTHER

## 2019-09-03 RX ORDER — IBUPROFEN 600 MG/1
600 TABLET ORAL EVERY 12 HOURS PRN
Qty: 60 TAB | Refills: 4 | Status: SHIPPED | OUTPATIENT
Start: 2019-09-03 | End: 2020-06-19

## 2019-09-03 SDOH — HEALTH STABILITY: MENTAL HEALTH: HOW OFTEN DO YOU HAVE A DRINK CONTAINING ALCOHOL?: 2-4 TIMES A MONTH

## 2019-09-03 SDOH — HEALTH STABILITY: MENTAL HEALTH: HOW OFTEN DO YOU HAVE 6 OR MORE DRINKS ON ONE OCCASION?: MONTHLY

## 2019-09-03 SDOH — HEALTH STABILITY: MENTAL HEALTH: HOW MANY STANDARD DRINKS CONTAINING ALCOHOL DO YOU HAVE ON A TYPICAL DAY?: 5 OR 6

## 2019-09-03 ASSESSMENT — PAIN SCALES - GENERAL: PAINLEVEL: NO PAIN

## 2019-09-03 NOTE — ASSESSMENT & PLAN NOTE
She has a history of chronic low back pain and she is currently working with physical therapy on this.  She is taking gabapentin 300 mg 3 times daily as well as ibuprofen 600 mg 1-2 times daily.  She is also establishing with [facility] for pain management tomorrow.

## 2019-09-03 NOTE — ASSESSMENT & PLAN NOTE
She had a positive fit test in 2015 and documented in 2018 as well.  There are notes in the chart that her previous PCP discussed the colonoscopy but she declined.  We again discussed the importance of a colonoscopy today.  She is 59 and has never had one.  She has no known history of colon cancer in her family.  She denies melena or hematochezia.  Unfortunately, she declines colonoscopy again today despite detailed discussion of recommendations and concern for colon cancer with a positive FIT.  She states that she will repeat her fit test this week.

## 2019-09-03 NOTE — OP THERAPY DAILY TREATMENT
Outpatient Physical Therapy  DAILY TREATMENT     [facility] Physical [address]  [address]  [place] [address]  Phone:  [phone]  Fax:  [phone]    Date: 09/03/2019    Patient: Yola Campuzano  YOB: 1960  MRN: [id number]     Time Calculation  Start time: 1430  Stop time: 1455 Time Calculation (min): 25 minutes       Chief Complaint: Back Problem    Visit #: 7    SUBJECTIVE:  Patient reports that she is feeling stiffer today with increased pain in hips and knee. However, overall her pain levels have been improving.     OBJECTIVE:  Current objective measures:          Therapeutic Exercises (CPT 15815):     1. LTR, x 10    2. SKTC, 2 x 30 seconds bilaterally     3. Piriformis stretch, 2 x 30 seconds bilaterally     4. Marching, 2 x 15    5. Hamstring curl, 2 x 15    6. Hip extension , 2 x 15    7. Heel-Toe rockers, 2 x 15    20. UPOC 9/20/19      Time-based treatments/modalities:  Therapeutic exercise minutes (CPT 72365): 25 minutes       Pain rating before treatment: 4  Pain rating after treatment: 2    ASSESSMENT:   Response to treatment: Patient reported feeling more limber and better after stretching and standing activities.     PLAN/RECOMMENDATIONS:   Plan for treatment: Follow up on stiffness/pain levels. Continue trunk stabilization in standing, NuStep. .  Planned interventions for next visit: continue with current treatment.

## 2019-09-03 NOTE — ASSESSMENT & PLAN NOTE
She reports heartburn over the past several months.  States she has been drinking more coffee, about half a pot per day as well as green tea or black tea consistently throughout the day.  Complains of burning sensation in the mid chest during the day.  When she lays down at night, she will wake up after about an hour with heartburn as well.  She usually eats dinner around 8 PM and does not go to bed until midnight, but she eats a snack before bed.  She is not currently taking any antacids.  She denies nausea, vomiting, melena, hematochezia

## 2019-09-03 NOTE — ASSESSMENT & PLAN NOTE
She had a DEXA scan in 2012 which showed osteopenia.  She has not had any repeat imaging since then.  She takes 50,000 units of D3 once a month.  She has not had a recent vitamin D level checked although she has labs ordered for this.

## 2019-09-06 ENCOUNTER — PHYSICAL THERAPY (OUTPATIENT)
Dept: PHYSICAL THERAPY | Facility: REHABILITATION | Age: 59
End: 2019-09-06
Attending: FAMILY MEDICINE
Payer: MEDICAID

## 2019-09-06 DIAGNOSIS — M54.40 BILATERAL LOW BACK PAIN WITH SCIATICA, SCIATICA LATERALITY UNSPECIFIED, UNSPECIFIED CHRONICITY: ICD-10-CM

## 2019-09-06 DIAGNOSIS — M25.551 PAIN OF BOTH HIP JOINTS: ICD-10-CM

## 2019-09-06 DIAGNOSIS — M25.552 PAIN OF BOTH HIP JOINTS: ICD-10-CM

## 2019-09-06 PROCEDURE — 97110 THERAPEUTIC EXERCISES: CPT

## 2019-09-06 NOTE — OP THERAPY DAILY TREATMENT
Outpatient Physical Therapy  DAILY TREATMENT     [facility] Physical [address].  [address]  [place] [address]  Phone:  [phone]  Fax:  [phone]    Date: 09/06/2019    Patient: Yola Campuzano  YOB: 1960  MRN: [id number]     Time Calculation  Start time: 1006  Stop time: 1030 Time Calculation (min): 24 minutes       Chief Complaint: Hip Problem and Back Problem    Visit #: 8    SUBJECTIVE:  Patient reports increased pain in low back and right hip due to a longer walk (about 7-8 blocks total) while carrying items on Wednesday.     OBJECTIVE:  Current objective measures:           Therapeutic Exercises (CPT 87305):     1. NuStep, 6 minutes at L0    2. LTR, 2 x 10    3. Piriformis , 2 x 30 seconds bilaterally    4. Abdominal bracing , 2 x 10 with 3 second holds    5. Bridges on fall , 2 x 10      Therapeutic Exercise Summary: Discussed implementing a daily walking routine of shorter distance to build up muscle strength/endurance for community walking.       Time-based treatments/modalities:  Therapeutic exercise minutes (CPT 31609): 24 minutes         ASSESSMENT:   Response to treatment: Patient reported decrease in pain following session with increased movement. Education provided on using movement to help with pain management.     PLAN/RECOMMENDATIONS:   Plan for treatment: Follow up on implementing walking routine. Continue trunk and hip strengthening - quadruped? .  Planned interventions for next visit: continue with current treatment.

## 2019-09-09 ENCOUNTER — PHYSICAL THERAPY (OUTPATIENT)
Dept: PHYSICAL THERAPY | Facility: REHABILITATION | Age: 59
End: 2019-09-09
Attending: FAMILY MEDICINE
Payer: MEDICAID

## 2019-09-09 DIAGNOSIS — M25.552 PAIN OF BOTH HIP JOINTS: ICD-10-CM

## 2019-09-09 DIAGNOSIS — M54.40 BILATERAL LOW BACK PAIN WITH SCIATICA, SCIATICA LATERALITY UNSPECIFIED, UNSPECIFIED CHRONICITY: ICD-10-CM

## 2019-09-09 DIAGNOSIS — M25.551 PAIN OF BOTH HIP JOINTS: ICD-10-CM

## 2019-09-09 PROCEDURE — 97110 THERAPEUTIC EXERCISES: CPT

## 2019-09-09 NOTE — OP THERAPY DAILY TREATMENT
Outpatient Physical Therapy  DAILY TREATMENT     [facility] Physical [address]  [address]  [place] [address]  Phone:  [phone]  Fax:  [phone]    Date: 09/09/2019    Patient: Yola Campuzano  YOB: 1960  MRN: [id number]     Time Calculation  Start time: 1330  Stop time: 1401 Time Calculation (min): 31 minutes       Chief Complaint: Back Problem and Hip Problem    Visit #: 9    SUBJECTIVE:  Patient reports that she feels like she is moving better with pain being less intense. Pain continues to be daily. Does report feeling more limber with stretches. Patient did start performing a walking routine, walking once on Saturday and walking home from PT today.     OBJECTIVE:  Current objective measures:   Davin Ivan Low Back Pain and Disability Score: 50       Therapeutic Exercises (CPT 55062):     1. NuStep, L0 x 6 minutes    2. Bridges, 2 x 10, Reviewed height of hip lift to decrease pain and avoid movement into lumbar extension    3. Quadruped hip extension, 2 x 5, Added to HEP. Required cues to maintain neutral trunk position and needed to be supported on knuckles due to CTS.       Time-based treatments/modalities:  Therapeutic exercise minutes (CPT 28078): 31 minutes         ASSESSMENT:   Response to treatment: Patient tolerated updated exercises in quadruped well. Continues to require cues for trunk alignment.     PLAN/RECOMMENDATIONS:   Plan for treatment: Follow up on updated HEP and walking home from PT session. Continue trunk stabilization exercises, preparing for DC on 9/20.  Planned interventions for next visit: continue with current treatment.

## 2019-09-11 NOTE — OP THERAPY DAILY TREATMENT
Outpatient Physical Therapy  DAILY TREATMENT     [facility] Physical [address].  [address]  [place] [address]  Phone:  [phone]  Fax:  [phone]    Date: 09/13/2019    Patient: Yola Campuzano  YOB: 1960  MRN: [id number]     Time Calculation               Chief Complaint: No chief complaint on file.    Visit #: 10    SUBJECTIVE:  ***    OBJECTIVE:  Current objective measures:           Therapeutic Exercises (CPT 43012):     1. NuStep, L0 x 6 minutes    2. Bridges, 2 x 10, Reviewed height of hip lift to decrease pain and avoid movement into lumbar extension    3. Quadruped hip extension, 2 x 5, Added to HEP. Required cues to maintain neutral trunk position and needed to be supported on knuckles due to CTS.     4. Supine TrA stab in figure four position    5. Tall kneeling ball lifts , cues for TrA engagement      Time-based treatments/modalities:  Other ***    Pain rating before treatment: {PAIN NUMBERS_1-10:46461}  Pain rating after treatment: {PAIN NUMBERS_1-10:02523}    ASSESSMENT:   Response to treatment: ***    PLAN/RECOMMENDATIONS:   Plan for treatment: therapy treatment to continue next visit.  Planned interventions for next visit: continue with current treatment. Continue with TrA stabilization and posterior chain strengthening. DC set for 9/20.       Reid Sepulveda, PT, DPT, PWR[employer]oves

## 2019-09-13 ENCOUNTER — APPOINTMENT (OUTPATIENT)
Dept: PHYSICAL THERAPY | Facility: REHABILITATION | Age: 59
End: 2019-09-13
Attending: FAMILY MEDICINE
Payer: MEDICAID

## 2019-09-13 NOTE — OP THERAPY EVALUATION
Outpatient Occupational Therapy  INITIAL NEUROLOGICAL EVALUATION    [facility] Occupational Therapy [address].  [address]  [place] [address]  Phone:  [phone]  Fax:  [phone]    Date of Evaluation: 2019    Patient: Yola Campuzano  YOB: 1960  MRN: [id number]     Referring Provider: Chris Ho D.C.  [address]  [place] [address]   Referring Diagnosis No admission diagnoses are documented for this encounter.     Time Calculation  Start time: 1500  Stop time: 1600 Time Calculation (min): 60 minutes       Occupational Therapy Occurrence Codes    Date of onset of impairment:  09   Date of occupational therapy care plan established or reviewed:  19   Date of occupational therapy treatment started:  19          Chief Complaint: Hand Numbness    Visit Diagnoses     ICD-10-CM   1. Carpal tunnel syndrome, unspecified laterality G56.00       Subjective:   History of Present Illness:     Date of onset:  2009    Mechanism of injury:  Pt has had numbness and tingling for the past 10 years.   Quality of life:  Good  Prior level of function:  Independent  Pain:     Current pain ratin    At best pain ratin    At worst pain ratin    Location:  Pain is in hips; Pt states she has occasional pain in Bilateral Upper Arm    Quality:  Cramping and aching    Aggravating factors:  Activity, sleeping, housework, gripping and carrying  Social Support:     Lives in:  Apartment    Lives with:  Alone  Hand dominance:  Right  Diagnostic Tests:     Diagnostic Tests Comments:  Per Pt: EMG indicated CTS  Treatments:     Current treatment:  Physical therapy    Treatment Comments:  Pt has had PT for her back. Pt has not had OT before.  Activities of Daily Living:     Patient reported ADL status: Pt is independent in I/ADLs. Pt states when her hands get numb it only takes a few minutes to get relief and she would continue doing her activities after  that.  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   • Hypertension    • Osteopenia 8/29/2012   • Substance abuse (HCC)    • Tobacco dependence 8/14/2012   • Tuberculosis     was dx in a rehab ceneter in 2006   • Urinary tract infection, site not specified    • Vitamin d deficiency 9/25/2012     Past Surgical History:   Procedure Laterality Date   • OPEN REDUCTION  1991    left ankle     Social History     Tobacco Use   • Smoking status: Current Every Day Smoker     Packs/day: 0.50     Years: 45.00     Pack years: 22.50     Types: Cigarettes   • Smokeless tobacco: Never Used   • Tobacco comment: previous 1 ppd for many years   Substance Use Topics   • Alcohol use: Yes     Frequency: 2-4 times a month     Drinks per session: 5 or 6     Binge frequency: Monthly     Comment: previous heavy alchol use, 1/2 gallon hard alcohol daily or 12 beers for 10 years, cut back years ago     Family and Occupational History     Socioeconomic History   • Marital status: Single     Spouse name: Not on file   • Number of children: Not on file   • Years of education: Not on file   • Highest education level: Not on file   Occupational History   • Not on file       Objective:   Active Range of Motion:   Upper extremity (left):     All left upper extremity active range of motion: All within functional limits  Upper extremity (right):     All right upper extremity active range of motion: All within functional limits    AROM Right Fingers Extension: Pt has dupuytren's contracture limiting Finger Extension.      Strength:   Upper extremity strength (left):     Shoulder flexion: 3    Shoulder extension:  3+    Elbow flexion: 3    Elbow extension: 3+     Wrist flexion: 3    Wrist extension: 3    Fingers flexion: 4-    Fingers extension: 4-    Fingers abduction: 3-    Fingers adduction: 3-  Upper extremity strength (right):    Shoulder flexion: 3    Shoulder extension: 3+    Elbow flexion: 3    Elbow extension: 3+    Wrist flexion: 3    Wrist extension: 3    Fingers flexion: 4-    Fingers extension: 4-    Fingers abduction: 3-    Fingers adduction: 3-    , Prehension, Pinch:  /Prehension (left):      strength: Impaired (27, 28, 25 :AVG 27)    Opposition: Within functional limits  /Prehension (right):      strength: Impaired (40, 39, 38    AVG 39)    Opposition: Within functional limits    Tone, Sensation and Coordination:   Tone:     Left upper extremity muscle tone: Normal    Right upper extremity muscle tone: Normal    Coordination   Upper extremity (left):     Fine motor: Within functional limits    Rapid alternating movements: Within functional limits    Finger to finger: Within functional limits    Finger touch to nose: Within functional limits  Upper extremity (right):     Fine motor: Within functional limits    Rapid alternating movements: Within functional limits    Finger to finger: Within functional limits    Finger touch to nose: Within functional limits      Coordination comments:   Pt states she would likely be unable to do grooved pegboard if her hands were numb and tingling, however, she stated these symptoms only last a few minutes at a time.    Cognition:     Orientation: normal to time, normal to place, normal to person and normal to situation    Cognition Comments:  No formal assessment completed. Pt's cognition is not a barrier to treatment.    Activities of Daily Living:     ADL Comments:  Pt is independent in all I/ADLs. Pt has to take breaks or schedule I/ADLs around her numbness and tingling. She also feels her numbness and tingling has impacted her ability to have a job. Pt would like to work as a   soon.    Bilateral Positive Phalens Test       Therapeutic Exercises (CPT 99891):     1. Median Nerve Glides, hold 20 seconds each position 2-3x/day or when symptoms arise, HEP    Therapeutic Treatments and Modalities:    1. Orthotic Training (CPT 61853), wrist stabilization brace    Pt educated on types of movements that cause carpal tunnel syndrome. Pt educated on wrist stabilization brace that would be beneficial for pt at night and during activities that normally increase her numbness and tingling such as talking on the phone and work if she begins a new job. Pt does currently have one wrist stabilization brace which she said she would bring in at next session to confirm. Pt provided with written directions to purchase braces on her own. Pt stated she is not able to afford a second brace at this time. OTR/L talked about wrapping a towel to keep wrist in neutral, however, explained that this is not always effective.OTR explained that pt would need to bring in braces to next appointment for adjustment when she is able to afford them.    Time-based treatments/modalities:  Therapeutic exercise minutes (CPT 44597): 15 minutes             Assessment, Response and Plan:   Impairments: difficulty performing job    Other Impairments:  Unpleasant numbness and tingling   Assessment details:  Pt has neutral affect. Pt has mod to severe CTS. Pt says the numbness and tingling impacts her ability to work. Pt also says that she needs to stop activities for several minutes when the numbness and tingling begins. Pt also has , finger abduction, finger adduction, wrist flex/ext, elbow flex/ext, and shoulder flex/ext strength deficits. Pt would benefit from strengthening, endurance training, brace wear, and nerve stretching to decrease impact of CTS on her occupational performance.  Barriers to therapy:  Transportation  Other barriers to therapy:   Pt has a difficulty time getting to sessions stating she has to take a bus and/or  walk from her home.   Prognosis: good    Goals:   Short Term Goals:   Pt to decrease numbness and tingling in bilateral hands by 25%.  Pt to increase  strength by 5 lbs for increased performance with grocery shopping.    Short term goal timespan:  2-4 weeks    Long Term Goals:   Pt to be independent in HEP and brace wear.   Pt to decrease numbness in tingling in bilateral hands by 80%.  Pt to increase  strength by 10 lbs for increased performance in I/ADLs such as grocery shopping.  Pt to increase UEFI score by 5 points for overall increased occupational performance.  Long term goal timespan:  6-8 weeks    Plan:   Therapy options:  Occupational therapy treatment to continue  Planned therapy interventions:  Orthotic Training (CPT 14878), Contrast Bath (CPT 63169), Hot or Cold Pack Therapy (CPT 41118), Manual Therapy (CPT 45806), Massage (CPT 70236), Self Care ADL Training (CPT 98113), Therapeutic Activities (CPT 65338), Therapeutic Exercise (CPT 81161) and Ultrasound (CPT 70166)  Frequency:  2x week  Duration in weeks:  5  Duration in visits:  10  Discussed with:  Patient  Plan details:  Pt to participate in occupational therapy 2x/wk for 5 weeks to decrease numbness and tingling and increase overall occupational performances, especially in IADLs and returning to work.      Functional Assessment Used  OT Functional Assessment Tool Used: UEFI  OT Functional Assessment Score: 67/80     Referring provider co-signature:  I have reviewed this plan of care and my co-signature certifies the need for services.  Certification Dates:   From 09/13/19    To 11/13/19    Physician Signature: ________________________________ Date: ______________

## 2019-09-18 ENCOUNTER — OCCUPATIONAL THERAPY (OUTPATIENT)
Dept: OCCUPATIONAL THERAPY | Facility: REHABILITATION | Age: 59
End: 2019-09-18
Attending: PHYSICIAN ASSISTANT
Payer: MEDICAID

## 2019-09-18 ENCOUNTER — PHYSICAL THERAPY (OUTPATIENT)
Dept: PHYSICAL THERAPY | Facility: REHABILITATION | Age: 59
End: 2019-09-18
Attending: FAMILY MEDICINE
Payer: MEDICAID

## 2019-09-18 DIAGNOSIS — G56.00 CARPAL TUNNEL SYNDROME, UNSPECIFIED LATERALITY: ICD-10-CM

## 2019-09-18 DIAGNOSIS — M25.552 PAIN OF BOTH HIP JOINTS: ICD-10-CM

## 2019-09-18 DIAGNOSIS — M25.551 PAIN OF BOTH HIP JOINTS: ICD-10-CM

## 2019-09-18 DIAGNOSIS — M54.40 BILATERAL LOW BACK PAIN WITH SCIATICA, SCIATICA LATERALITY UNSPECIFIED, UNSPECIFIED CHRONICITY: ICD-10-CM

## 2019-09-18 PROCEDURE — 97110 THERAPEUTIC EXERCISES: CPT

## 2019-09-18 PROCEDURE — 97166 OT EVAL MOD COMPLEX 45 MIN: CPT

## 2019-09-18 SDOH — ECONOMIC STABILITY: GENERAL: QUALITY OF LIFE: GOOD

## 2019-09-18 ASSESSMENT — ENCOUNTER SYMPTOMS
QUALITY: CRAMPING
EXACERBATED BY: CARRYING
PAIN SCALE: 0
EXACERBATED BY: HOUSEWORK
EXACERBATED BY: ACTIVITY
EXACERBATED BY: SLEEPING
PAIN SCALE AT LOWEST: 0
QUALITY: ACHING
EXACERBATED BY: GRIPPING
PAIN SCALE AT HIGHEST: 2

## 2019-09-18 NOTE — OP THERAPY DAILY TREATMENT
Outpatient Physical Therapy  DAILY TREATMENT     [facility] Physical [address]  [address]  [place] [address]  Phone:  [phone]  Fax:  [phone]    Date: 09/18/2019    Patient: Yola Campuzano  YOB: 1960  MRN: [id number]     Time Calculation  Start time: 1605  Stop time: 1630 Time Calculation (min): 25 minutes       Chief Complaint: Back Problem    Visit #: 10    SUBJECTIVE:  Patient reports that she has been perform community ambulation for increased distance without increasing pain. Patient feels ready to discharge next treatment session.      OBJECTIVE:  Current objective measures:           Therapeutic Exercises (CPT 71013):     1. Reviewed HEP     2. Discussed positioning for core stabilization exercises including modified plank and crunches.       Therapeutic Exercise Summary: HEP includes:   Stretches: LTR, SKTC, Figure 4, HSS  Strengthening: Bridges, hip abduction in sidelying, hip adduction in hooklying, rows, pullbacks, quadruped hip extension.       Time-based treatments/modalities:  Therapeutic exercise minutes (CPT 00701): 25 minutes       ASSESSMENT:   Response to treatment: Patient able to demonstrate all exercises as part of HEP without need for cues to perform appropriately. She has been able to manage her symptoms with increased walking, stretching/strengthening, and trunk stabilization. Patient is therefore ready for discharge.     PLAN/RECOMMENDATIONS:   Plan for treatment: Complete discharge testing.  Planned interventions for next visit: continue with current treatment.

## 2019-10-09 ENCOUNTER — APPOINTMENT (OUTPATIENT)
Dept: OCCUPATIONAL THERAPY | Facility: REHABILITATION | Age: 59
End: 2019-10-09
Attending: PHYSICIAN ASSISTANT
Payer: MEDICAID

## 2019-10-10 ENCOUNTER — TELEPHONE (OUTPATIENT)
Dept: PHYSICAL THERAPY | Facility: REHABILITATION | Age: 59
End: 2019-10-10

## 2019-10-10 NOTE — OP THERAPY DISCHARGE SUMMARY
Outpatient Physical Therapy  DISCHARGE SUMMARY NOTE      [facility] Therapy [address].  [address]  [place] [address]  Phone:  [phone]  Fax:  [phone]    Date of Visit: 10/10/2019    Patient: Yola Campuzano  YOB: 1960  MRN: [id number]     Referring Provider: No referring provider defined for this encounter.   Referring Diagnosis No admission diagnoses are documented for this encounter.     Physical Therapy Occurrence Codes    Date of onset of impairment:  7/24/09   Date physical therapy care plan established or reviewed:  7/24/19   Date physical therapy treatment started:  7/24/19          Functional Assessment Used        Your patient is being discharged from Physical Therapy with the following comments:   · Goals met    Comments:  Patient underwent physical therapy to address back and bilateral hip pain. She responded well with reports of decreasing pain with increased activity. A thorough HEP was established for stretching and strengthening as well as implementing a walking routine. Patient however did not attend therapy for final discharge visit to complete appropriate discharge testing.     Recommendations:  Continue with provided HEP and return if there are changes in function/pain.     Jayshree Winter, PT, DPT    Date: 10/10/2019

## 2019-10-23 ENCOUNTER — APPOINTMENT (OUTPATIENT)
Dept: OCCUPATIONAL THERAPY | Facility: REHABILITATION | Age: 59
End: 2019-10-23
Attending: PHYSICIAN ASSISTANT
Payer: MEDICAID

## 2019-10-25 ENCOUNTER — APPOINTMENT (OUTPATIENT)
Dept: OCCUPATIONAL THERAPY | Facility: REHABILITATION | Age: 59
End: 2019-10-25
Attending: PHYSICIAN ASSISTANT
Payer: MEDICAID

## 2019-10-30 ENCOUNTER — APPOINTMENT (OUTPATIENT)
Dept: OCCUPATIONAL THERAPY | Facility: REHABILITATION | Age: 59
End: 2019-10-30
Attending: PHYSICIAN ASSISTANT
Payer: MEDICAID

## 2019-11-01 ENCOUNTER — APPOINTMENT (OUTPATIENT)
Dept: OCCUPATIONAL THERAPY | Facility: REHABILITATION | Age: 59
End: 2019-11-01
Attending: PHYSICIAN ASSISTANT
Payer: MEDICAID

## 2019-11-21 ENCOUNTER — TELEPHONE (OUTPATIENT)
Dept: OCCUPATIONAL THERAPY | Facility: REHABILITATION | Age: 59
End: 2019-11-21

## 2019-11-21 NOTE — OP THERAPY DISCHARGE SUMMARY
Outpatient Occupational Therapy  DISCHARGE SUMMARY NOTE    [facility] Therapy [address].  [address]  [place] [address]  Phone:  [phone]  Fax:  [phone]    Date of Visit: 11/21/2019    Patient: Yola Campuzano  YOB: 1960  MRN: [id number]     Referring Provider: No referring provider defined for this encounter.   Referring Diagnosis: No admission diagnoses are documented for this encounter.     Occupational Therapy Occurrence Codes    Date of onset of impairment:  9/18/09   Date of occupational therapy care plan established or reviewed:  9/18/19   Date of occupational therapy treatment started:  9/18/19                Your patient is being discharged from Occupational Therapy with the following comments:   · Patient cancelled or missed more than 2 scheduled appointments/non-compliant    Comments:  Pt did not return after evaluation for any treatment due to no shows.     Limitations Remaining:  Unable to determine due to pt not showing for appointments. Pt likely still presents with same limitations as evaluation due to lack of treatment.    Recommendations:  Pt to return to treatment with a new referral when she is more able to follow through with plan of care.    Sandy Schneider MS,OTR/L    Date: 11/21/2019

## 2020-02-12 ENCOUNTER — OFFICE VISIT (OUTPATIENT)
Dept: MEDICAL GROUP | Facility: MEDICAL CENTER | Age: 60
End: 2020-02-12
Attending: INTERNAL MEDICINE
Payer: MEDICAID

## 2020-02-12 VITALS
OXYGEN SATURATION: 95 % | HEIGHT: 64 IN | BODY MASS INDEX: 30.39 KG/M2 | HEART RATE: 88 BPM | SYSTOLIC BLOOD PRESSURE: 122 MMHG | RESPIRATION RATE: 16 BRPM | WEIGHT: 178 LBS | TEMPERATURE: 97.1 F | DIASTOLIC BLOOD PRESSURE: 78 MMHG

## 2020-02-12 DIAGNOSIS — K08.89 PAIN, DENTAL: ICD-10-CM

## 2020-02-12 DIAGNOSIS — E78.5 DYSLIPIDEMIA: ICD-10-CM

## 2020-02-12 DIAGNOSIS — M85.80 OSTEOPENIA, UNSPECIFIED LOCATION: ICD-10-CM

## 2020-02-12 DIAGNOSIS — R74.8 ELEVATED ALKALINE PHOSPHATASE LEVEL: ICD-10-CM

## 2020-02-12 DIAGNOSIS — R05.8 POST-VIRAL COUGH SYNDROME: ICD-10-CM

## 2020-02-12 DIAGNOSIS — R19.5 POSITIVE FIT (FECAL IMMUNOCHEMICAL TEST): ICD-10-CM

## 2020-02-12 DIAGNOSIS — Z12.31 SCREENING MAMMOGRAM, ENCOUNTER FOR: ICD-10-CM

## 2020-02-12 DIAGNOSIS — Z13.1 SCREENING FOR DIABETES MELLITUS: ICD-10-CM

## 2020-02-12 PROBLEM — R30.0 DYSURIA: Status: ACTIVE | Noted: 2020-02-12

## 2020-02-12 PROBLEM — R30.0 DYSURIA: Status: RESOLVED | Noted: 2020-02-12 | Resolved: 2020-02-12

## 2020-02-12 PROCEDURE — 99213 OFFICE O/P EST LOW 20 MIN: CPT | Performed by: INTERNAL MEDICINE

## 2020-02-12 PROCEDURE — 99214 OFFICE O/P EST MOD 30 MIN: CPT | Performed by: INTERNAL MEDICINE

## 2020-02-12 RX ORDER — AMOXICILLIN AND CLAVULANATE POTASSIUM 875; 125 MG/1; MG/1
1 TABLET, FILM COATED ORAL 2 TIMES DAILY
Qty: 14 TAB | Refills: 0 | Status: SHIPPED | OUTPATIENT
Start: 2020-02-12 | End: 2020-02-19

## 2020-02-12 RX ORDER — BENZONATATE 100 MG/1
100 CAPSULE ORAL 3 TIMES DAILY PRN
Qty: 30 CAP | Refills: 0 | Status: SHIPPED | OUTPATIENT
Start: 2020-02-12 | End: 2020-08-28

## 2020-02-12 ASSESSMENT — PAIN SCALES - GENERAL: PAINLEVEL: NO PAIN

## 2020-02-12 NOTE — PROGRESS NOTES
Subjective:   Yola Campuzano is a 60 y.o. female here today for lingering cough, dental pain, requesting new labs    Pain, dental  She has a history of recurrent dental infections.  She does not currently see a dentist.  Reports her left lower most posterior molar has been painful for the past day and she has started to feel somewhat sick.  She is concerned she has started to develop a dental infection.  She denies any abscess formation or pus drainage from the tooth.    Post-viral cough syndrome  She reports becoming sick with upper respiratory symptoms about 3 weeks ago.  She started feeling better about a week ago however the cough has continued to persist.  She denies fevers, chills, shortness of breath, chest pain, nasal congestion, sore throat.  She has been using some over-the-counter cough syrups and oral cold medication but not recently.  She has not been able to get rid of the cough which is her main concern today.     Positive FIT (fecal immunochemical test)  She does have a history of a positive fit test dating back to 2015.  She never had a colonoscopy.  She currently declines referral for colonoscopy but she is willing to repeat her fit test.  This had been ordered for her by her previous PCP however she failed to obtain it.  She is requesting a new order today.    Osteopenia  She is currently taking vitamin D2 50,000 units once a month    Dyslipidemia  She is not currently on any lipid-lowering medication.  Last labs were checked in 2015.       Current medicines (including changes today)  Current Outpatient Medications   Medication Sig Dispense Refill   • benzonatate (TESSALON) 100 MG Cap Take 1 Cap by mouth 3 times a day as needed for Cough. 30 Cap 0   • amoxicillin-clavulanate (AUGMENTIN) 875-125 MG Tab Take 1 Tab by mouth 2 times a day for 7 days. 14 Tab 0   • ibuprofen (MOTRIN) 600 MG Tab Take 1 Tab by mouth every 12 hours as needed for Mild Pain. 60 Tab 4   • vitamin D, Ergocalciferol,  (DRISDOL) 77803 units Cap capsule Take 1 Cap by mouth every 28 days. 3 Cap 3   • gabapentin (NEURONTIN) 300 MG Cap TAKE 1 CAPSULE ORALLY 3 TIMES DAILY 90 Cap 5   • Misc. Devices Misc Adjustable cane to use as directed 1 Device 0     No current facility-administered medications for this visit.      She  has a past medical history of Anxiety, Carpal tunnel syndrome on right (10/30/2012), Depression, Dyslipidemia (9/25/2012), Dyslipidemia (9/25/2012), History of Guillain-Wells syndrome (11/23/2016), Hypertension, Osteopenia (8/29/2012), Substance abuse ([facility]), Tobacco dependence (8/14/2012), Tuberculosis, Urinary tract infection, site not specified, and Vitamin d deficiency (9/25/2012). She also has no past medical history of Clotting disorder ([facility]), COPD, or Cushings syndrome ([facility]).    ROS   Denies chest pain, shortness of breath  As above in HPI     Objective:     Vitals:    02/12/20 1419   BP: 122/78   Pulse: 88   Resp: 16   Temp: 36.2 °C (97.1 °F)   SpO2: 95%     Body mass index is 30.54 kg/m².   Physical Exam:  Constitutional: Alert, no distress.  Skin: Warm, dry, good turgor, no rashes in visible areas.  Eye: Equal, round and reactive, conjunctiva clear, lids normal.  ENMT: Lips without lesions, poor dentition, left most posterior lower molar is carious and sore, gumline is erythematous   Respiratory: Unlabored respiratory effort, lungs clear to auscultation, no wheezes, no ronchi.  Psych: Alert and oriented x3, normal affect and mood.      Assessment and Plan:   The following treatment plan was discussed    1. Osteopenia, unspecified location  -Continue vitamin D2 50,000 units monthly  - VITAMIN D,25 HYDROXY; Future    2. Positive FIT (fecal immunochemical test)  Declines colonoscopy.  We will proceed with repeat FIT  - OCCULT BLOOD FECES IMMUNOASSAY; Future    3. Pain, dental  Possible early abscess formation with obvious carious tooth.  I have given her a list of dentists and instructed her to follow-up as soon  as possible.  We will treat with Augmentin as below  - amoxicillin-clavulanate (AUGMENTIN) 875-125 MG Tab; Take 1 Tab by mouth 2 times a day for 7 days.  Dispense: 14 Tab; Refill: 0    4. Dyslipidemia  Currently off of lipid-lowering medication  - Lipid Profile; Future    5. Post-viral cough syndrome  Symptoms are mild at this point.  We will treat with Tessalon as below  - benzonatate (TESSALON) 100 MG Cap; Take 1 Cap by mouth 3 times a day as needed for Cough.  Dispense: 30 Cap; Refill: 0    6. Screening for diabetes mellitus  - HEMOGLOBIN A1C; Future    7. Elevated alkaline phosphatase level  - Comp Metabolic Panel; Future    8. Screening mammogram, encounter for  She will wait until she is due in June to proceed with mammo  - MA-SCREEN MAMMO W/CAD-BILAT; Future        Followup: No follow-ups on file.

## 2020-02-12 NOTE — ASSESSMENT & PLAN NOTE
She reports becoming sick with upper respiratory symptoms about 3 weeks ago.  She started feeling better about a week ago however the cough has continued to persist.  She denies fevers, chills, shortness of breath, chest pain, nasal congestion, sore throat.  She has been using some over-the-counter cough syrups and oral cold medication but not recently.  She has not been able to get rid of the cough which is her main concern today.

## 2020-02-12 NOTE — ASSESSMENT & PLAN NOTE
She has a history of recurrent dental infections.  She does not currently see a dentist.  Reports her left lower most posterior molar has been painful for the past day and she has started to feel somewhat sick.  She is concerned she has started to develop a dental infection.  She denies any abscess formation or pus drainage from the tooth.

## 2020-02-12 NOTE — ASSESSMENT & PLAN NOTE
She does have a history of a positive fit test dating back to 2015.  She never had a colonoscopy.  She currently declines referral for colonoscopy but she is willing to repeat her fit test.  This had been ordered for her by her previous PCP however she failed to obtain it.  She is requesting a new order today.

## 2020-04-08 ENCOUNTER — PATIENT MESSAGE (OUTPATIENT)
Dept: MEDICAL GROUP | Facility: MEDICAL CENTER | Age: 60
End: 2020-04-08

## 2020-04-08 DIAGNOSIS — F17.200 TOBACCO DEPENDENCE: ICD-10-CM

## 2020-06-08 DIAGNOSIS — M54.40 BILATERAL LOW BACK PAIN WITH SCIATICA, SCIATICA LATERALITY UNSPECIFIED, UNSPECIFIED CHRONICITY: ICD-10-CM

## 2020-06-08 RX ORDER — GABAPENTIN 300 MG/1
CAPSULE ORAL
Qty: 90 CAP | Refills: 0 | Status: SHIPPED | OUTPATIENT
Start: 2020-06-08 | End: 2020-08-03 | Stop reason: SDUPTHER

## 2020-06-18 ENCOUNTER — PATIENT MESSAGE (OUTPATIENT)
Dept: MEDICAL GROUP | Facility: MEDICAL CENTER | Age: 60
End: 2020-06-18

## 2020-06-18 DIAGNOSIS — M54.40 BILATERAL LOW BACK PAIN WITH SCIATICA, SCIATICA LATERALITY UNSPECIFIED, UNSPECIFIED CHRONICITY: ICD-10-CM

## 2020-06-18 DIAGNOSIS — H65.00 NON-RECURRENT ACUTE SEROUS OTITIS MEDIA, UNSPECIFIED LATERALITY: ICD-10-CM

## 2020-06-18 RX ORDER — FLUTICASONE PROPIONATE 50 MCG
1 SPRAY, SUSPENSION (ML) NASAL DAILY
Qty: 16 G | Refills: 3 | Status: SHIPPED | OUTPATIENT
Start: 2020-06-18 | End: 2020-08-28

## 2020-06-19 RX ORDER — IBUPROFEN 600 MG/1
TABLET ORAL
Qty: 60 TAB | Refills: 0 | Status: SHIPPED | OUTPATIENT
Start: 2020-06-19 | End: 2020-08-03 | Stop reason: SDUPTHER

## 2020-08-03 ENCOUNTER — PATIENT MESSAGE (OUTPATIENT)
Dept: MEDICAL GROUP | Facility: MEDICAL CENTER | Age: 60
End: 2020-08-03

## 2020-08-03 DIAGNOSIS — M54.40 BILATERAL LOW BACK PAIN WITH SCIATICA, SCIATICA LATERALITY UNSPECIFIED, UNSPECIFIED CHRONICITY: ICD-10-CM

## 2020-08-03 DIAGNOSIS — M85.80 OSTEOPENIA, UNSPECIFIED LOCATION: ICD-10-CM

## 2020-08-04 RX ORDER — IBUPROFEN 600 MG/1
600 TABLET ORAL EVERY 8 HOURS PRN
Qty: 60 TAB | Refills: 0 | Status: SHIPPED | OUTPATIENT
Start: 2020-08-04 | End: 2020-08-28 | Stop reason: SDUPTHER

## 2020-08-04 RX ORDER — ERGOCALCIFEROL 1.25 MG/1
50000 CAPSULE ORAL
Qty: 4 CAP | Refills: 0 | Status: SHIPPED | OUTPATIENT
Start: 2020-08-04 | End: 2020-08-28 | Stop reason: SDUPTHER

## 2020-08-04 RX ORDER — GABAPENTIN 300 MG/1
300 CAPSULE ORAL 3 TIMES DAILY
Qty: 90 CAP | Refills: 0 | Status: SHIPPED | OUTPATIENT
Start: 2020-08-04 | End: 2020-08-28 | Stop reason: DRUGHIGH

## 2020-08-04 NOTE — TELEPHONE ENCOUNTER
From: Yola Campuzano  To: Vannesa Bernard M.D.  Sent: 8/3/2020 5:11 PM PDT  Subject: Prescription Question    I need to request prescription refills. The medications are gabapentin 300mg., Ibuprofen 600mg., and vitamin D 50,000 units. Thank you. Have a great day.

## 2020-08-05 ENCOUNTER — HOSPITAL ENCOUNTER (OUTPATIENT)
Dept: LAB | Facility: MEDICAL CENTER | Age: 60
End: 2020-08-05
Attending: INTERNAL MEDICINE
Payer: MEDICAID

## 2020-08-05 DIAGNOSIS — Z13.1 SCREENING FOR DIABETES MELLITUS: ICD-10-CM

## 2020-08-05 DIAGNOSIS — R74.8 ELEVATED ALKALINE PHOSPHATASE LEVEL: ICD-10-CM

## 2020-08-05 DIAGNOSIS — M85.80 OSTEOPENIA, UNSPECIFIED LOCATION: ICD-10-CM

## 2020-08-05 DIAGNOSIS — E78.5 DYSLIPIDEMIA: ICD-10-CM

## 2020-08-05 LAB
25(OH)D3 SERPL-MCNC: 44 NG/ML (ref 30–100)
ALBUMIN SERPL BCP-MCNC: 4.3 G/DL (ref 3.2–4.9)
ALBUMIN/GLOB SERPL: 1.4 G/DL
ALP SERPL-CCNC: 120 U/L (ref 30–99)
ALT SERPL-CCNC: 17 U/L (ref 2–50)
ANION GAP SERPL CALC-SCNC: 13 MMOL/L (ref 7–16)
AST SERPL-CCNC: 18 U/L (ref 12–45)
BILIRUB SERPL-MCNC: 0.6 MG/DL (ref 0.1–1.5)
BUN SERPL-MCNC: 18 MG/DL (ref 8–22)
CALCIUM SERPL-MCNC: 9.1 MG/DL (ref 8.5–10.5)
CHLORIDE SERPL-SCNC: 103 MMOL/L (ref 96–112)
CHOLEST SERPL-MCNC: 196 MG/DL (ref 100–199)
CO2 SERPL-SCNC: 24 MMOL/L (ref 20–33)
CREAT SERPL-MCNC: 0.62 MG/DL (ref 0.5–1.4)
EST. AVERAGE GLUCOSE BLD GHB EST-MCNC: 114 MG/DL
GLOBULIN SER CALC-MCNC: 3 G/DL (ref 1.9–3.5)
GLUCOSE SERPL-MCNC: 97 MG/DL (ref 65–99)
HBA1C MFR BLD: 5.6 % (ref 0–5.6)
HDLC SERPL-MCNC: 40 MG/DL
LDLC SERPL CALC-MCNC: 131 MG/DL
POTASSIUM SERPL-SCNC: 4.8 MMOL/L (ref 3.6–5.5)
PROT SERPL-MCNC: 7.3 G/DL (ref 6–8.2)
SODIUM SERPL-SCNC: 140 MMOL/L (ref 135–145)
TRIGL SERPL-MCNC: 123 MG/DL (ref 0–149)

## 2020-08-05 PROCEDURE — 80061 LIPID PANEL: CPT

## 2020-08-05 PROCEDURE — 36415 COLL VENOUS BLD VENIPUNCTURE: CPT

## 2020-08-05 PROCEDURE — 83036 HEMOGLOBIN GLYCOSYLATED A1C: CPT

## 2020-08-05 PROCEDURE — 82306 VITAMIN D 25 HYDROXY: CPT

## 2020-08-05 PROCEDURE — 80053 COMPREHEN METABOLIC PANEL: CPT

## 2020-08-10 ENCOUNTER — PHARMACY VISIT (OUTPATIENT)
Dept: PHARMACY | Facility: MEDICAL CENTER | Age: 60
End: 2020-08-10
Payer: COMMERCIAL

## 2020-08-10 PROCEDURE — RXMED WILLOW AMBULATORY MEDICATION CHARGE: Performed by: PHYSICIAN ASSISTANT

## 2020-08-28 ENCOUNTER — OFFICE VISIT (OUTPATIENT)
Dept: MEDICAL GROUP | Facility: MEDICAL CENTER | Age: 60
End: 2020-08-28
Attending: INTERNAL MEDICINE
Payer: MEDICAID

## 2020-08-28 VITALS
SYSTOLIC BLOOD PRESSURE: 124 MMHG | HEART RATE: 76 BPM | DIASTOLIC BLOOD PRESSURE: 80 MMHG | BODY MASS INDEX: 28.34 KG/M2 | RESPIRATION RATE: 16 BRPM | OXYGEN SATURATION: 96 % | TEMPERATURE: 97.9 F | HEIGHT: 64 IN | WEIGHT: 166 LBS

## 2020-08-28 DIAGNOSIS — F17.200 TOBACCO DEPENDENCE: ICD-10-CM

## 2020-08-28 DIAGNOSIS — M85.80 OSTEOPENIA, UNSPECIFIED LOCATION: ICD-10-CM

## 2020-08-28 DIAGNOSIS — G89.29 CHRONIC BILATERAL LOW BACK PAIN, UNSPECIFIED WHETHER SCIATICA PRESENT: ICD-10-CM

## 2020-08-28 DIAGNOSIS — H93.13 TINNITUS OF BOTH EARS: ICD-10-CM

## 2020-08-28 DIAGNOSIS — M54.50 CHRONIC BILATERAL LOW BACK PAIN, UNSPECIFIED WHETHER SCIATICA PRESENT: ICD-10-CM

## 2020-08-28 DIAGNOSIS — E78.5 DYSLIPIDEMIA: ICD-10-CM

## 2020-08-28 PROCEDURE — 99214 OFFICE O/P EST MOD 30 MIN: CPT | Performed by: INTERNAL MEDICINE

## 2020-08-28 PROCEDURE — RXMED WILLOW AMBULATORY MEDICATION CHARGE: Performed by: INTERNAL MEDICINE

## 2020-08-28 PROCEDURE — 99213 OFFICE O/P EST LOW 20 MIN: CPT | Performed by: INTERNAL MEDICINE

## 2020-08-28 RX ORDER — GABAPENTIN 400 MG/1
400 CAPSULE ORAL 3 TIMES DAILY
Qty: 90 CAP | Refills: 3 | Status: SHIPPED | OUTPATIENT
Start: 2020-08-28 | End: 2021-01-04 | Stop reason: SDUPTHER

## 2020-08-28 RX ORDER — IBUPROFEN 600 MG/1
600 TABLET ORAL EVERY 8 HOURS PRN
Qty: 60 TAB | Refills: 3 | Status: SHIPPED | OUTPATIENT
Start: 2020-08-28 | End: 2021-02-05 | Stop reason: SDUPTHER

## 2020-08-28 RX ORDER — ERGOCALCIFEROL 1.25 MG/1
50000 CAPSULE ORAL
Qty: 4 CAP | Refills: 3 | Status: SHIPPED | OUTPATIENT
Start: 2020-08-28 | End: 2021-05-10 | Stop reason: SDUPTHER

## 2020-08-28 RX ORDER — ERGOCALCIFEROL 1.25 MG/1
50000 CAPSULE ORAL
Qty: 4 CAP | Refills: 3 | Status: SHIPPED | OUTPATIENT
Start: 2020-08-28 | End: 2020-08-28 | Stop reason: SDUPTHER

## 2020-08-28 ASSESSMENT — PATIENT HEALTH QUESTIONNAIRE - PHQ9: CLINICAL INTERPRETATION OF PHQ2 SCORE: 0

## 2020-08-28 NOTE — ASSESSMENT & PLAN NOTE
She reports that lately she has had to do more heavy lifting and is experiencing more low back pain than normal.  She is requesting an increase in her gabapentin.  She is currently taking 300 mg 3 times daily.  She is also using ibuprofen 600 mg about once a day or less.  Currently denies numbness, weakness, tingling in her legs.

## 2020-08-28 NOTE — ASSESSMENT & PLAN NOTE
The 10-year ASCVD risk score ([place] COREY Jr., et al., 2013) is: 7.9%.  At this time, she remains off of statin therapy.  We discussed risk factor modification including smoking cessation and dietary changes to help lower her LDL..  She prefers to remain off of medication at this time.

## 2020-08-28 NOTE — ASSESSMENT & PLAN NOTE
She reports that starting after a significant upper respiratory infection in February 2020, she has had a persistent sensation of buzzing in both of her ears.  She denies associated hearing loss or dizziness.  She tried using Flonase on a regular basis to see if this would help but did not find benefit.  She is also tried earwax removal drops without improvement.  She denies pressure in her ears or the feeling like her ears need to pop.  She does report loud noise exposure when she was younger.

## 2020-08-31 NOTE — PROGRESS NOTES
Subjective:   Yola Campuzano is a 60 y.o. female here today for review labs, worsening back pain, ringing in ears    Low back pain  She reports that lately she has had to do more heavy lifting and is experiencing more low back pain than normal.  She is requesting an increase in her gabapentin.  She is currently taking 300 mg 3 times daily.  She is also using ibuprofen 600 mg about once a day or less.  Currently denies numbness, weakness, tingling in her legs.      Tinnitus of both ears  She reports that starting after a significant upper respiratory infection in February 2020, she has had a persistent sensation of buzzing in both of her ears.  She denies associated hearing loss or dizziness.  She tried using Flonase on a regular basis to see if this would help but did not find benefit.  She is also tried earwax removal drops without improvement.  She denies pressure in her ears or the feeling like her ears need to pop.  She does report loud noise exposure when she was younger.      Dyslipidemia  The 10-year ASCVD risk score ([place] DC Jr., et al., 2013) is: 7.9%.  At this time, she remains off of statin therapy.  We discussed risk factor modification including smoking cessation and dietary changes to help lower her LDL..  She prefers to remain off of medication at this time.      Tobacco dependence  She is currently smoking about half a pack a day or less.  She is using Nicorette gum to try to help quit but has not been able to fully quit.    Osteopenia  She continues on her vitamin D 50,000 units every month however she also reports supplementing this with a daily vitamin D when she can afford it.  Her last labs checked recently showed a vitamin D level of 44.         Current medicines (including changes today)  Current Outpatient Medications   Medication Sig Dispense Refill   • gabapentin (NEURONTIN) 400 MG Cap Take 1 Cap by mouth 3 times a day. 90 Cap 3   • ibuprofen (MOTRIN) 600 MG Tab Take 1 Tab by mouth  every 8 hours as needed for Moderate Pain. 60 Tab 3   • nicotine polacrilex (NICORETTE) 2 MG Gum Chew 1 PIECE BY MOUTH IF NEEDED FOR SMOKING CESSATION 50 Each 3   • ergocalciferol (DRISDOL) 78077 UNIT capsule Take 1 Cap by mouth every 14 days. 4 Cap 3   • Misc. Devices Misc Adjustable cane to use as directed 1 Device 0     No current facility-administered medications for this visit.      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 She also has no past medical history of Clotting disorder ([facility]), COPD, or Cushings syndrome ([facility]).    ROS   Denies chest pain, shortness of breath  As above in HPI     Objective:     Vitals:    08/28/20 1452   BP: 124/80   Pulse: 76   Resp: 16   Temp: 36.6 °C (97.9 °F)   SpO2: 96%     Body mass index is 28.48 kg/m².   Physical Exam:  Constitutional: Alert, no distress.  Skin: Warm, dry, good turgor, no rashes in visible areas.  Eye: Equal, round and reactive, conjunctiva clear, lids normal.  ENMT: Lips without lesions, very poor dentition, oropharynx clear, TM's clear bilaterally, no cerumen build up  Neck: Trachea midline, no masses, no thyromegaly. No cervical or supraclavicular lymphadenopathy  Respiratory: Unlabored respiratory effort, lungs clear to auscultation, no wheezes, no ronchi.  Cardiovascular: Regular rate and rhythm, no murmurs appreciated, no lower extremity edema  Psych: Alert and oriented x3, normal affect and mood.      Results and Imaging:   Lab Results   Component Value Date/Time    CHOLSTRLTOT 196 08/05/2020 10:45 AM     (H) 08/05/2020 10:45 AM    HDL 40 08/05/2020 10:45 AM    TRIGLYCERIDE 123 08/05/2020 10:45 AM        Lab Results   Component Value Date/Time    SODIUM 140 08/05/2020 10:45 AM    POTASSIUM 4.8 08/05/2020 10:45 AM    CHLORIDE 103 08/05/2020 10:45 AM    CO2 24 08/05/2020 10:45 AM    GLUCOSE 97 08/05/2020 10:45 AM    BUN 18 08/05/2020 10:45 AM    CREATININE 0.62 08/05/2020 10:45 AM     Lab Results   Component Value Date/Time    ALKPHOSPHAT 120 (H) 08/05/2020 10:45 AM    ASTSGOT 18 08/05/2020 10:45 AM    ALTSGPT 17 08/05/2020 10:45 AM    TBILIRUBIN 0.6 08/05/2020 10:45 AM      Results for REJI CRAWFORD (MRN [id number]) as of 8/31/2020 09:02   Ref. Range 8/5/2020 10:45   25-Hydroxy   Vitamin D 25 Latest Ref Range: 30 - 100 ng/mL 44     Results for REJI CRAWFORD (MRN [id number]) as of 8/31/2020 09:02   Ref. Range 8/5/2020 10:45   Glycohemoglobin Latest Ref Range: 0.0 - 5.6 % 5.6   Estim. Avg Glu Latest Units: mg/dL 114     Assessment and Plan:   The following treatment plan was discussed    1. Chronic bilateral low back pain, unspecified whether sciatica present  With recent exacerbation due to more heavy lifting.  We will increase her gabapentin at her request to 400 mg 3 times daily.  We discussed further increases if needed in the future.  I have refilled her ibuprofen.  - gabapentin (NEURONTIN) 400 MG Cap; Take 1 Cap by mouth 3 times a day.  Dispense: 90 Cap; Refill: 3  - ibuprofen (MOTRIN) 600 MG Tab; Take 1 Tab by mouth every 8 hours as needed for Moderate Pain.  Dispense: 60 Tab; Refill: 3    2. Tinnitus of both ears  Etiology unclear.  She developed symptoms after an upper respiratory infection however this was 6 months ago and would expect this to be resolved by now.  She does have some loud noise exposure however this is in her distant past.  She has a normal exam today with no cerumen impaction.  She has not responded to nasal decongestant, Flonase.  I have referred her to ENT for further evaluation.  - REFERRAL TO ENT    3. Osteopenia, unspecified location  Her vitamin D is at goal however she is  taking extra vitamin D and she prefers just to take the high-dose.  We will increase her high-dose to twice monthly instead of monthly and she can stop the over-the-counter  -Due for updated DEXA scan, ordered  - ergocalciferol (DRISDOL) 80176 UNIT capsule; Take 1 Cap by mouth every 14 days.  Dispense: 4 Cap; Refill: 3    4. Tobacco dependence  We discussed the importance of smoking cessation to lower her ASCVD risk score and for overall health benefit.  I have refilled her Nicorette gum.  - nicotine polacrilex (NICORETTE) 2 MG Gum; Chew 1 PIECE BY MOUTH IF NEEDED FOR SMOKING CESSATION  Dispense: 50 Each; Refill: 3    5. Dyslipidemia  She declines any medication management.  We discussed lifestyle modifications and smoking cessation.  We will continue to monitor.  -Repeat lipids in 6 months after trial of lifestyle changes        Followup: Return in about 6 months (around 2/28/2021), or if symptoms worsen or fail to improve, for pain, hyperlipidemia, labs.

## 2020-08-31 NOTE — ASSESSMENT & PLAN NOTE
She is currently smoking about half a pack a day or less.  She is using Nicorette gum to try to help quit but has not been able to fully quit.

## 2020-08-31 NOTE — ASSESSMENT & PLAN NOTE
She continues on her vitamin D 50,000 units every month however she also reports supplementing this with a daily vitamin D when she can afford it.  Her last labs checked recently showed a vitamin D level of 44.

## 2020-09-08 PROCEDURE — RXMED WILLOW AMBULATORY MEDICATION CHARGE: Performed by: INTERNAL MEDICINE

## 2020-09-10 ENCOUNTER — PHARMACY VISIT (OUTPATIENT)
Dept: PHARMACY | Facility: MEDICAL CENTER | Age: 60
End: 2020-09-10
Payer: COMMERCIAL

## 2020-09-29 ENCOUNTER — TELEPHONE (OUTPATIENT)
Dept: MEDICAL GROUP | Facility: MEDICAL CENTER | Age: 60
End: 2020-09-29

## 2020-09-29 NOTE — TELEPHONE ENCOUNTER
Pt left message stating that she has not received her referral, left message advising of referral and info.

## 2020-10-04 PROCEDURE — RXMED WILLOW AMBULATORY MEDICATION CHARGE: Performed by: INTERNAL MEDICINE

## 2020-10-07 ENCOUNTER — HOSPITAL ENCOUNTER (OUTPATIENT)
Dept: RADIOLOGY | Facility: MEDICAL CENTER | Age: 60
End: 2020-10-07
Attending: INTERNAL MEDICINE
Payer: MEDICAID

## 2020-10-07 ENCOUNTER — PHARMACY VISIT (OUTPATIENT)
Dept: PHARMACY | Facility: MEDICAL CENTER | Age: 60
End: 2020-10-07
Payer: COMMERCIAL

## 2020-10-07 DIAGNOSIS — M85.80 OSTEOPENIA, UNSPECIFIED LOCATION: ICD-10-CM

## 2020-10-07 DIAGNOSIS — Z12.31 SCREENING MAMMOGRAM, ENCOUNTER FOR: ICD-10-CM

## 2020-10-07 PROCEDURE — 77080 DXA BONE DENSITY AXIAL: CPT

## 2020-10-07 PROCEDURE — 77067 SCR MAMMO BI INCL CAD: CPT

## 2020-11-03 PROCEDURE — RXMED WILLOW AMBULATORY MEDICATION CHARGE: Performed by: INTERNAL MEDICINE

## 2020-11-09 ENCOUNTER — PHARMACY VISIT (OUTPATIENT)
Dept: PHARMACY | Facility: MEDICAL CENTER | Age: 60
End: 2020-11-09
Payer: COMMERCIAL

## 2020-12-03 PROCEDURE — RXMED WILLOW AMBULATORY MEDICATION CHARGE: Performed by: INTERNAL MEDICINE

## 2020-12-08 ENCOUNTER — PHARMACY VISIT (OUTPATIENT)
Dept: PHARMACY | Facility: MEDICAL CENTER | Age: 60
End: 2020-12-08
Payer: COMMERCIAL

## 2021-01-04 DIAGNOSIS — M54.50 CHRONIC BILATERAL LOW BACK PAIN, UNSPECIFIED WHETHER SCIATICA PRESENT: ICD-10-CM

## 2021-01-04 DIAGNOSIS — G89.29 CHRONIC BILATERAL LOW BACK PAIN, UNSPECIFIED WHETHER SCIATICA PRESENT: ICD-10-CM

## 2021-01-04 PROCEDURE — RXMED WILLOW AMBULATORY MEDICATION CHARGE: Performed by: INTERNAL MEDICINE

## 2021-01-05 PROCEDURE — RXMED WILLOW AMBULATORY MEDICATION CHARGE: Performed by: INTERNAL MEDICINE

## 2021-01-05 RX ORDER — GABAPENTIN 400 MG/1
400 CAPSULE ORAL 3 TIMES DAILY
Qty: 90 CAP | Refills: 3 | Status: SHIPPED | OUTPATIENT
Start: 2021-01-05 | End: 2021-05-10 | Stop reason: SDUPTHER

## 2021-01-08 ENCOUNTER — PHARMACY VISIT (OUTPATIENT)
Dept: PHARMACY | Facility: MEDICAL CENTER | Age: 61
End: 2021-01-08
Payer: COMMERCIAL

## 2021-02-05 DIAGNOSIS — G89.29 CHRONIC BILATERAL LOW BACK PAIN, UNSPECIFIED WHETHER SCIATICA PRESENT: ICD-10-CM

## 2021-02-05 DIAGNOSIS — M54.50 CHRONIC BILATERAL LOW BACK PAIN, UNSPECIFIED WHETHER SCIATICA PRESENT: ICD-10-CM

## 2021-02-05 PROCEDURE — RXMED WILLOW AMBULATORY MEDICATION CHARGE: Performed by: INTERNAL MEDICINE

## 2021-02-05 RX ORDER — IBUPROFEN 600 MG/1
600 TABLET ORAL EVERY 8 HOURS PRN
Qty: 60 TAB | Refills: 3 | Status: SHIPPED | OUTPATIENT
Start: 2021-02-05 | End: 2021-05-20 | Stop reason: SDUPTHER

## 2021-02-09 ENCOUNTER — PHARMACY VISIT (OUTPATIENT)
Dept: PHARMACY | Facility: MEDICAL CENTER | Age: 61
End: 2021-02-09
Payer: COMMERCIAL

## 2021-03-05 PROCEDURE — RXMED WILLOW AMBULATORY MEDICATION CHARGE: Performed by: INTERNAL MEDICINE

## 2021-03-09 ENCOUNTER — PHARMACY VISIT (OUTPATIENT)
Dept: PHARMACY | Facility: MEDICAL CENTER | Age: 61
End: 2021-03-09
Payer: COMMERCIAL

## 2021-03-15 DIAGNOSIS — Z23 NEED FOR VACCINATION: ICD-10-CM

## 2021-04-08 PROCEDURE — RXMED WILLOW AMBULATORY MEDICATION CHARGE: Performed by: INTERNAL MEDICINE

## 2021-04-15 ENCOUNTER — PHARMACY VISIT (OUTPATIENT)
Dept: PHARMACY | Facility: MEDICAL CENTER | Age: 61
End: 2021-04-15
Payer: COMMERCIAL

## 2021-05-10 DIAGNOSIS — G89.29 CHRONIC BILATERAL LOW BACK PAIN, UNSPECIFIED WHETHER SCIATICA PRESENT: ICD-10-CM

## 2021-05-10 DIAGNOSIS — M85.80 OSTEOPENIA, UNSPECIFIED LOCATION: ICD-10-CM

## 2021-05-10 DIAGNOSIS — M54.50 CHRONIC BILATERAL LOW BACK PAIN, UNSPECIFIED WHETHER SCIATICA PRESENT: ICD-10-CM

## 2021-05-10 PROCEDURE — RXMED WILLOW AMBULATORY MEDICATION CHARGE: Performed by: INTERNAL MEDICINE

## 2021-05-11 ENCOUNTER — PHARMACY VISIT (OUTPATIENT)
Dept: PHARMACY | Facility: MEDICAL CENTER | Age: 61
End: 2021-05-11
Payer: COMMERCIAL

## 2021-05-11 PROCEDURE — RXMED WILLOW AMBULATORY MEDICATION CHARGE: Performed by: INTERNAL MEDICINE

## 2021-05-11 RX ORDER — GABAPENTIN 400 MG/1
400 CAPSULE ORAL 3 TIMES DAILY
Qty: 90 CAPSULE | Refills: 0 | Status: SHIPPED | OUTPATIENT
Start: 2021-05-11 | End: 2021-05-20 | Stop reason: SDUPTHER

## 2021-05-11 RX ORDER — ERGOCALCIFEROL 1.25 MG/1
50000 CAPSULE ORAL
Qty: 4 CAPSULE | Refills: 0 | Status: SHIPPED | OUTPATIENT
Start: 2021-05-11 | End: 2021-05-20 | Stop reason: SDUPTHER

## 2021-05-20 ENCOUNTER — OFFICE VISIT (OUTPATIENT)
Dept: MEDICAL GROUP | Facility: MEDICAL CENTER | Age: 61
End: 2021-05-20
Attending: INTERNAL MEDICINE
Payer: MEDICAID

## 2021-05-20 VITALS
WEIGHT: 168 LBS | DIASTOLIC BLOOD PRESSURE: 88 MMHG | HEART RATE: 70 BPM | RESPIRATION RATE: 16 BRPM | OXYGEN SATURATION: 96 % | HEIGHT: 64 IN | BODY MASS INDEX: 28.68 KG/M2 | TEMPERATURE: 97.9 F | SYSTOLIC BLOOD PRESSURE: 138 MMHG

## 2021-05-20 DIAGNOSIS — R53.83 OTHER FATIGUE: ICD-10-CM

## 2021-05-20 DIAGNOSIS — M85.80 OSTEOPENIA, UNSPECIFIED LOCATION: ICD-10-CM

## 2021-05-20 DIAGNOSIS — Z13.1 SCREENING FOR DIABETES MELLITUS: ICD-10-CM

## 2021-05-20 DIAGNOSIS — G89.29 CHRONIC BILATERAL LOW BACK PAIN, UNSPECIFIED WHETHER SCIATICA PRESENT: ICD-10-CM

## 2021-05-20 DIAGNOSIS — R74.8 ELEVATED ALKALINE PHOSPHATASE LEVEL: ICD-10-CM

## 2021-05-20 DIAGNOSIS — R06.83 SNORING: ICD-10-CM

## 2021-05-20 DIAGNOSIS — E78.5 DYSLIPIDEMIA: ICD-10-CM

## 2021-05-20 DIAGNOSIS — M54.50 CHRONIC BILATERAL LOW BACK PAIN, UNSPECIFIED WHETHER SCIATICA PRESENT: ICD-10-CM

## 2021-05-20 PROCEDURE — 99214 OFFICE O/P EST MOD 30 MIN: CPT | Performed by: INTERNAL MEDICINE

## 2021-05-20 PROCEDURE — 99213 OFFICE O/P EST LOW 20 MIN: CPT | Performed by: INTERNAL MEDICINE

## 2021-05-20 RX ORDER — GABAPENTIN 400 MG/1
400 CAPSULE ORAL 3 TIMES DAILY
Qty: 90 CAPSULE | Refills: 5 | Status: SHIPPED | OUTPATIENT
Start: 2021-05-20 | End: 2021-12-11 | Stop reason: SDUPTHER

## 2021-05-20 RX ORDER — ERGOCALCIFEROL 1.25 MG/1
50000 CAPSULE ORAL
Qty: 4 CAPSULE | Refills: 5 | Status: SHIPPED | OUTPATIENT
Start: 2021-05-20 | End: 2022-06-15 | Stop reason: SDUPTHER

## 2021-05-20 RX ORDER — IBUPROFEN 600 MG/1
600 TABLET ORAL EVERY 8 HOURS PRN
Qty: 60 TABLET | Refills: 5 | Status: SHIPPED | OUTPATIENT
Start: 2021-05-20 | End: 2021-12-11 | Stop reason: SDUPTHER

## 2021-05-20 ASSESSMENT — PATIENT HEALTH QUESTIONNAIRE - PHQ9
SUM OF ALL RESPONSES TO PHQ QUESTIONS 1-9: 6
5. POOR APPETITE OR OVEREATING: 0 - NOT AT ALL
CLINICAL INTERPRETATION OF PHQ2 SCORE: 2

## 2021-05-20 NOTE — PROGRESS NOTES
Subjective:   Yola Campuzano is a 61 y.o. female here today for increased fatigue, questions about Covid vaccine    Other fatigue  She reports a lot of fatigue lately.  States that she has been under more stress with various life circumstances going on.  She is not sleeping well.  Typically sleeps for about 2 to 3 hours before waking up.  Often she wakes up to urinate but not always.  She states that she does snore.  She has never been evaluated for sleep apnea.  At night she will typically take her gabapentin which makes her a little bit sleepy.  She has also tried trazodone which did not help as well as Flexeril which did not help.  She believes she used melatonin once and is not sure if it was effective.  She is drinking caffeine consistently throughout the day.  States she has a whole pot of coffee in the mornings and drinks tea after that.    Osteopenia  Recently had a DEXA scan done in October 2020 which showed stable osteopenia.  She continues on with supplemental vitamin D to 50,000 units twice a month and her last vitamin D level checked in September was at goal.    Dyslipidemia  The 10-year ASCVD risk score ([place] DC Jr., et al., 2013) is: 10.2%  She remains off of lipid-lowering medication.     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  According to patient, she was under an incredible amount of stress at the time, was taking Valium, and her psychiatrist believes she had a severe panic attack.  She felt her arm paralyzed first and she did need to be hospitalized but she states she was never on a ventilator and she could walk throughout the entire episode.  Unclear if Guillain-Barré is an accurate diagnosis.  She is wondering if she can get the Covid vaccine with this history.    Current medicines (including changes today)  Current Outpatient Medications   Medication Sig Dispense Refill   • gabapentin (NEURONTIN) 400 MG Cap Take 1 Cap by mouth 3 times a day. 90 capsule 5   •  ibuprofen (MOTRIN) 600 MG Tab Take 1 Tab by mouth every 8 hours as needed for Moderate Pain. 60 tablet 5   • ergocalciferol (DRISDOL) 83466 UNIT capsule Take 1 Cap by mouth every 14 days. 4 capsule 5   • nicotine polacrilex (NICORETTE) 2 MG Gum Chew 1 PIECE BY MOUTH IF NEEDED FOR SMOKING CESSATION 50 Each 3   • Misc. Devices Misc Adjustable cane to use as directed 1 Device 0     No current facility-administered medications for this visit.     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 She also has no past medical history of Clotting disorder ([facility]), COPD, or Cushings syndrome ([facility]).    ROS   Denies chest pain, shortness of breath  As above in HPI     Objective:     Vitals:    05/20/21 1328   BP: 138/88   Pulse: 70   Resp: 16   Temp: 36.6 °C (97.9 °F)   SpO2: 96%     Body mass index is 28.82 kg/m².   Physical Exam:  Constitutional: Alert, no distress.  Skin: Warm, dry, good turgor, no rashes in visible areas.  Eye: Equal, round and reactive, conjunctiva clear, lids normal.  Respiratory: Unlabored respiratory effort, lungs clear to auscultation, no wheezes, no ronchi.  Cardiovascular: Regular rate and rhythm, no murmurs appreciated, no lower extremity edema  Psych: Alert and oriented x3, normal affect and mood.      Assessment and Plan:   The following treatment plan was discussed    1. Dyslipidemia  Would qualify for statin therapy but she has desired to remain off of medication if possible.  We will obtain updated labs.  - Lipid Profile; Future    2. Elevated alkaline phosphatase level  - Comp Metabolic Panel; Future    3. Osteopenia, unspecified location  Stable, will  obtain updated labs  - VITAMIN D,25 HYDROXY; Future  - ergocalciferol (DRISDOL) 88059 UNIT capsule; Take 1 Cap by mouth every 14 days.  Dispense: 4 capsule; Refill: 5    4. Screening for diabetes mellitus  - HEMOGLOBIN A1C; Future    5. Other fatigue  Suspect contribution from underlying depression/increased stress as well as insomnia.  Will screen for sleep apnea as well as underlying hypothyroidism or anemia.  - TSH WITH REFLEX TO FT4; Future  - CBC WITH DIFFERENTIAL; Future  - REFERRAL TO PULMONARY AND SLEEP MEDICINE    6. Chronic bilateral low back pain, unspecified whether sciatica present  Stable, controlled with current meds which were refilled today  - gabapentin (NEURONTIN) 400 MG Cap; Take 1 Cap by mouth 3 times a day.  Dispense: 90 capsule; Refill: 5  - ibuprofen (MOTRIN) 600 MG Tab; Take 1 Tab by mouth every 8 hours as needed for Moderate Pain.  Dispense: 60 tablet; Refill: 5    7. Snoring  Would benefit from sleep study given fatigue, snoring, body habitus to rule out MAMADOU  - REFERRAL TO PULMONARY AND SLEEP MEDICINE        Followup: Return in about 6 months (around 11/20/2021), or if symptoms worsen or fail to improve.

## 2021-05-20 NOTE — ASSESSMENT & PLAN NOTE
The 10-year ASCVD risk score ([place] COREY Jr., et al., 2013) is: 10.2%  She remains off of lipid-lowering medication.

## 2021-05-20 NOTE — ASSESSMENT & PLAN NOTE
Recently had a DEXA scan done in October 2020 which showed stable osteopenia.  She continues on with supplemental vitamin D to 50,000 units twice a month and her last vitamin D level checked in September was at goal.

## 2021-05-20 NOTE — ASSESSMENT & PLAN NOTE
She reports a lot of fatigue lately.  States that she has been under more stress with various life circumstances going on.  She is not sleeping well.  Typically sleeps for about 2 to 3 hours before waking up.  Often she wakes up to urinate but not always.  She states that she does snore.  She has never been evaluated for sleep apnea.  At night she will typically take her gabapentin which makes her a little bit sleepy.  She has also tried trazodone which did not help as well as Flexeril which did not help.  She believes she used melatonin once and is not sure if it was effective.  She is drinking caffeine consistently throughout the day.  States she has a whole pot of coffee in the mornings and drinks tea after that.

## 2021-05-28 PROCEDURE — RXMED WILLOW AMBULATORY MEDICATION CHARGE: Performed by: INTERNAL MEDICINE

## 2021-06-05 PROCEDURE — RXMED WILLOW AMBULATORY MEDICATION CHARGE: Performed by: INTERNAL MEDICINE

## 2021-06-07 PROCEDURE — RXMED WILLOW AMBULATORY MEDICATION CHARGE: Performed by: INTERNAL MEDICINE

## 2021-06-14 ENCOUNTER — PHARMACY VISIT (OUTPATIENT)
Dept: PHARMACY | Facility: MEDICAL CENTER | Age: 61
End: 2021-06-14
Payer: COMMERCIAL

## 2021-07-08 PROCEDURE — RXMED WILLOW AMBULATORY MEDICATION CHARGE: Performed by: INTERNAL MEDICINE

## 2021-07-12 ENCOUNTER — PHARMACY VISIT (OUTPATIENT)
Dept: PHARMACY | Facility: MEDICAL CENTER | Age: 61
End: 2021-07-12
Payer: COMMERCIAL

## 2021-08-10 PROCEDURE — RXMED WILLOW AMBULATORY MEDICATION CHARGE: Performed by: INTERNAL MEDICINE

## 2021-08-13 ENCOUNTER — PHARMACY VISIT (OUTPATIENT)
Dept: PHARMACY | Facility: MEDICAL CENTER | Age: 61
End: 2021-08-13
Payer: COMMERCIAL

## 2021-09-10 PROCEDURE — RXMED WILLOW AMBULATORY MEDICATION CHARGE: Performed by: INTERNAL MEDICINE

## 2021-09-14 ENCOUNTER — PHARMACY VISIT (OUTPATIENT)
Dept: PHARMACY | Facility: MEDICAL CENTER | Age: 61
End: 2021-09-14
Payer: COMMERCIAL

## 2021-10-11 PROCEDURE — RXMED WILLOW AMBULATORY MEDICATION CHARGE: Performed by: INTERNAL MEDICINE

## 2021-10-12 ENCOUNTER — PHARMACY VISIT (OUTPATIENT)
Dept: PHARMACY | Facility: MEDICAL CENTER | Age: 61
End: 2021-10-12
Payer: COMMERCIAL

## 2021-11-10 PROCEDURE — RXMED WILLOW AMBULATORY MEDICATION CHARGE: Performed by: INTERNAL MEDICINE

## 2021-11-17 ENCOUNTER — PHARMACY VISIT (OUTPATIENT)
Dept: PHARMACY | Facility: MEDICAL CENTER | Age: 61
End: 2021-11-17
Payer: COMMERCIAL

## 2021-12-11 DIAGNOSIS — G89.29 CHRONIC BILATERAL LOW BACK PAIN, UNSPECIFIED WHETHER SCIATICA PRESENT: ICD-10-CM

## 2021-12-11 DIAGNOSIS — M54.50 CHRONIC BILATERAL LOW BACK PAIN, UNSPECIFIED WHETHER SCIATICA PRESENT: ICD-10-CM

## 2021-12-11 DIAGNOSIS — F17.200 TOBACCO DEPENDENCE: ICD-10-CM

## 2021-12-11 PROCEDURE — RXMED WILLOW AMBULATORY MEDICATION CHARGE: Performed by: INTERNAL MEDICINE

## 2021-12-15 ENCOUNTER — PHARMACY VISIT (OUTPATIENT)
Dept: PHARMACY | Facility: MEDICAL CENTER | Age: 61
End: 2021-12-15
Payer: COMMERCIAL

## 2021-12-15 PROCEDURE — RXMED WILLOW AMBULATORY MEDICATION CHARGE: Performed by: INTERNAL MEDICINE

## 2021-12-15 RX ORDER — GABAPENTIN 400 MG/1
400 CAPSULE ORAL 3 TIMES DAILY
Qty: 90 CAPSULE | Refills: 5 | Status: SHIPPED | OUTPATIENT
Start: 2021-12-15 | End: 2022-06-15 | Stop reason: SDUPTHER

## 2021-12-15 RX ORDER — IBUPROFEN 600 MG/1
600 TABLET ORAL EVERY 8 HOURS PRN
Qty: 60 TABLET | Refills: 5 | Status: SHIPPED | OUTPATIENT
Start: 2021-12-15 | End: 2022-06-15 | Stop reason: SDUPTHER

## 2022-01-01 ENCOUNTER — OFFICE VISIT (OUTPATIENT)
Dept: URGENT CARE | Facility: CLINIC | Age: 62
End: 2022-01-01
Payer: MEDICAID

## 2022-01-01 VITALS
BODY MASS INDEX: 29.3 KG/M2 | TEMPERATURE: 98.4 F | DIASTOLIC BLOOD PRESSURE: 60 MMHG | RESPIRATION RATE: 18 BRPM | HEIGHT: 64 IN | HEART RATE: 104 BPM | OXYGEN SATURATION: 94 % | WEIGHT: 171.6 LBS | SYSTOLIC BLOOD PRESSURE: 134 MMHG

## 2022-01-01 DIAGNOSIS — B02.9 HERPES ZOSTER WITHOUT COMPLICATION: ICD-10-CM

## 2022-01-01 PROCEDURE — 99203 OFFICE O/P NEW LOW 30 MIN: CPT | Performed by: STUDENT IN AN ORGANIZED HEALTH CARE EDUCATION/TRAINING PROGRAM

## 2022-01-01 RX ORDER — VALACYCLOVIR HYDROCHLORIDE 1 G/1
1000 TABLET, FILM COATED ORAL 3 TIMES DAILY
Qty: 21 TABLET | Refills: 0 | Status: SHIPPED | OUTPATIENT
Start: 2022-01-01 | End: 2022-01-10

## 2022-01-01 RX ORDER — ACETAMINOPHEN 500 MG
500-1000 TABLET ORAL EVERY 6 HOURS PRN
Qty: 30 TABLET | Refills: 0 | Status: SHIPPED | OUTPATIENT
Start: 2022-01-01 | End: 2022-03-17

## 2022-01-02 NOTE — PROGRESS NOTES
"Subjective:   CHIEF COMPLAINT  Chief Complaint   Patient presents with   • Rash     Pt has a rash on (R) arm, neck, blisters x 6 days        HPI  Yola Campuzano is a 61 y.o. female who presents with a chief complaint of a rash that extends from the right side of her neck down her arm to her wrist which developed approximately 6 days ago.  Says the rash developed after putting a heating pad on her arm.  Says she is having mild pain.  She tried taking ibuprofen which is provided some relief.  Positive ROS for body aches and fatigue.    REVIEW OF SYSTEMS  General: no fever or chills  GI: no nausea or vomiting  See HPI for further details.    PAST MEDICAL HISTORY  Patient Active Problem List    Diagnosis Date Noted   • Other fatigue 05/20/2021   • Snoring 05/20/2021   • Tinnitus of both ears 08/28/2020   • Pain, dental 02/12/2020   • Elevated alkaline phosphatase level 02/12/2020   • GERD (gastroesophageal reflux disease) 09/03/2019   • Pain of both hip joints 06/12/2019   • History of Guillain-[place] syndrome 11/23/2016   • Insomnia 11/23/2016   • Carpal tunnel syndrome on right 10/30/2012   • Dyslipidemia 09/25/2012   • Osteopenia 08/29/2012   • Tobacco dependence 08/14/2012   • Low back pain 08/14/2012       SURGICAL HISTORY   has a past surgical history that includes open reduction (1991).    ALLERGIES  Allergies   Allergen Reactions   • Albuterol        \"Reaction about 13 years ago but don't remember what it was\"       CURRENT MEDICATIONS  Home Medications     Reviewed by Christo Dorantes'jessica (Medical Assistant) on 01/01/22 at 1715  Med List Status: <None>   Medication Last Dose Status   ergocalciferol (DRISDOL) 33528 UNIT capsule Not Taking Active   gabapentin (NEURONTIN) 400 MG Cap Taking Active   ibuprofen (MOTRIN) 600 MG Tab PRN Active   Misc. Devices Misc  Active   nicotine polacrilex (NICORETTE) 2 MG Gum Taking Active   VITAMIN D PO Taking Active                SOCIAL HISTORY  Social History " "    Tobacco Use   • Smoking status: Current Every Day Smoker     Packs/day: 0.50     Years: 45.00     Pack years: 22.50     Types: Cigarettes   • Smokeless tobacco: Never Used   • Tobacco comment: previous 1 ppd for many years   Vaping Use   • Vaping Use: Never used   Substance and Sexual Activity   • Alcohol use: Yes     Comment: previous heavy alchol use, 1/2 gallon hard alcohol daily or 12 beers for 10 years, cut back years ago   • Drug use: Not Currently     Comment: previous meth use   • Sexual activity: Not Currently     Partners: Male     Birth control/protection: Post-Menopausal       FAMILY HISTORY  Family History   Problem Relation Age of Onset   • Arthritis Mother    • Diabetes Father    • Hypertension Father    • Heart Attack Father    • Heart Disease Maternal Grandmother    • Lung Disease Maternal Grandfather    • Hypertension Maternal Aunt    • Cancer Neg Hx    • Stroke Neg Hx    • Hyperlipidemia Neg Hx           Objective:   PHYSICAL EXAM  VITAL SIGNS: /60 (BP Location: Left arm, Patient Position: Sitting, BP Cuff Size: Adult)   Pulse (!) 104   Temp 36.9 °C (98.4 °F) (Temporal)   Resp 18   Ht 1.626 m (5' 4\")   Wt 77.8 kg (171 lb 9.6 oz)   LMP 12/18/2009   SpO2 94%   BMI 29.46 kg/m²     Gen: no acute distress, normal voice  Skin: grouped vesicles on an erythematous base in a dermatomal distribution extending from the posterior margins of the right neck distally to her right wrist, dorsally.  Lungs: CTAB w/ symmetric expansion  CV: RRR w/o murmurs or clicks  Psych: normal affect, normal judgement, alert, awake    Assessment/Plan:     1. Herpes zoster without complication  valacyclovir (VALTREX) 1 GM Tab    Lidocaine 4 % Gel    acetaminophen (TYLENOL) 500 MG Tab       Differential diagnosis, natural history, supportive care, and indications for immediate follow-up discussed. All questions answered. Patient agrees with the plan of care.    Follow-up as needed if symptoms worsen or fail to " improve to PCP, Urgent care or Emergency Room.    Please note that this dictation was created using voice recognition software. I have made a reasonable attempt to correct obvious errors, but I expect that there are errors of grammar and possibly content that I did not discover before finalizing the note.

## 2022-01-03 ENCOUNTER — PHARMACY VISIT (OUTPATIENT)
Dept: PHARMACY | Facility: MEDICAL CENTER | Age: 62
End: 2022-01-03
Payer: COMMERCIAL

## 2022-01-03 PROCEDURE — RXMED WILLOW AMBULATORY MEDICATION CHARGE: Performed by: STUDENT IN AN ORGANIZED HEALTH CARE EDUCATION/TRAINING PROGRAM

## 2022-01-03 RX ORDER — LIDOCAINE HYDROCHLORIDE 40 MG/ML
SOLUTION TOPICAL
Qty: 150 ML | Refills: 0 | Status: SHIPPED | OUTPATIENT
Start: 2022-01-01 | End: 2022-03-17

## 2022-01-07 ENCOUNTER — PHARMACY VISIT (OUTPATIENT)
Dept: PHARMACY | Facility: MEDICAL CENTER | Age: 62
End: 2022-01-07
Payer: COMMERCIAL

## 2022-01-13 PROCEDURE — RXMED WILLOW AMBULATORY MEDICATION CHARGE: Performed by: INTERNAL MEDICINE

## 2022-01-14 ENCOUNTER — PHARMACY VISIT (OUTPATIENT)
Dept: PHARMACY | Facility: MEDICAL CENTER | Age: 62
End: 2022-01-14
Payer: COMMERCIAL

## 2022-02-01 ENCOUNTER — OFFICE VISIT (OUTPATIENT)
Dept: MEDICAL GROUP | Facility: MEDICAL CENTER | Age: 62
End: 2022-02-01
Attending: FAMILY MEDICINE
Payer: MEDICAID

## 2022-02-01 ENCOUNTER — PHARMACY VISIT (OUTPATIENT)
Dept: PHARMACY | Facility: MEDICAL CENTER | Age: 62
End: 2022-02-01
Payer: COMMERCIAL

## 2022-02-01 VITALS
HEART RATE: 80 BPM | DIASTOLIC BLOOD PRESSURE: 82 MMHG | TEMPERATURE: 97.2 F | HEIGHT: 64 IN | BODY MASS INDEX: 29.49 KG/M2 | SYSTOLIC BLOOD PRESSURE: 174 MMHG | RESPIRATION RATE: 16 BRPM | OXYGEN SATURATION: 95 % | WEIGHT: 172.7 LBS

## 2022-02-01 DIAGNOSIS — M62.81 MUSCLE WEAKNESS OF RIGHT UPPER EXTREMITY: ICD-10-CM

## 2022-02-01 DIAGNOSIS — B02.29: ICD-10-CM

## 2022-02-01 DIAGNOSIS — L02.92 BOIL: ICD-10-CM

## 2022-02-01 DIAGNOSIS — Z71.9 ENCOUNTER FOR CONSULTATION: ICD-10-CM

## 2022-02-01 PROCEDURE — 99213 OFFICE O/P EST LOW 20 MIN: CPT | Performed by: FAMILY MEDICINE

## 2022-02-01 PROCEDURE — 99214 OFFICE O/P EST MOD 30 MIN: CPT | Performed by: FAMILY MEDICINE

## 2022-02-01 PROCEDURE — RXMED WILLOW AMBULATORY MEDICATION CHARGE: Performed by: FAMILY MEDICINE

## 2022-02-01 RX ORDER — AMOXICILLIN AND CLAVULANATE POTASSIUM 875; 125 MG/1; MG/1
1 TABLET, FILM COATED ORAL 2 TIMES DAILY
Qty: 20 TABLET | Refills: 0 | Status: SHIPPED | OUTPATIENT
Start: 2022-02-01 | End: 2022-03-17

## 2022-02-01 RX ORDER — PREDNISONE 10 MG/1
TABLET ORAL
Qty: 30 TABLET | Refills: 0 | Status: SHIPPED | OUTPATIENT
Start: 2022-02-01 | End: 2022-03-17

## 2022-02-01 NOTE — PROGRESS NOTES
"Subjective     Yola Campuzano is a 61 y.o. female who presents with Numbness (rt arm numbness x 5 weeks)            HPI 1. Zoster radiculitis-patient reported onset initially of severe pain over the lateral aspect of her right upper arm on 12/26/2021. Within 2 days she started developing a pink rash that then became blistery. She was seen on 1/1/2022 at urgent care and diagnosed with acute herpes zoster dermatitis. Prescription for Valtrex was ordered which patient was able to  on 1/3/2022 and she completed that. She reports that pain has improved significantly although she still has some areas of numbness on the lateral aspect of her right arm but is most concerned about weakness that has developed over her right arm at the same time as these other symptoms.  2.  Boil-patient notes tender area that has developed in her right axilla.  Due to severe right arm weakness she has difficulty providing hygiene or ventilation in that area.  She also reports several small tender pink papules in her right inguinal crease/panty line area  ROS patient is reporting several other boils in her right inguinal panty line area           Objective     BP (!) 174/82   Pulse 80   Temp 36.2 °C (97.2 °F) (Temporal)   Resp 16   Ht 1.626 m (5' 4.02\")   Wt 78.3 kg (172 lb 11.2 oz)   LMP 12/18/2009   SpO2 95%   BMI 29.63 kg/m²      Physical Exam  General-alert cooperative female no acute distress  Neck- trachea is midline, thyroid is symmetric and nontender, no cervical adenopathy, supple   Right upper extremity-faded pink macules evidence of recent healing noted over the lateral aspect of her arm curving over to the radial aspect of her forearm and ending at the base of her thumb. There are no current blisters or ulcers. Light touch is significantly reduced over the same area as the rash. Patient has intact finger strength but markedly weak flexion extension at the elbow and wrist. She has intact shoulder shrug " bilaterally  Skin-patient has a 7 mm erythematous papule in the anterior right axilla consistent with a early boil                Assessment & Plan        1. Herpes zoster radiculitis      2. Muscle weakness of right upper extremity    3. Skin infection    Plan: 1. Urgent PT referral  2. Prednisone 50 mg with a 10-day taper  3. Revisit with me in 2 weeks  4. Rx Augmentin x10 days

## 2022-02-15 PROCEDURE — RXMED WILLOW AMBULATORY MEDICATION CHARGE: Performed by: INTERNAL MEDICINE

## 2022-02-17 ENCOUNTER — OFFICE VISIT (OUTPATIENT)
Dept: MEDICAL GROUP | Facility: MEDICAL CENTER | Age: 62
End: 2022-02-17
Attending: FAMILY MEDICINE
Payer: MEDICAID

## 2022-02-17 ENCOUNTER — PHARMACY VISIT (OUTPATIENT)
Dept: PHARMACY | Facility: MEDICAL CENTER | Age: 62
End: 2022-02-17
Payer: COMMERCIAL

## 2022-02-17 VITALS
SYSTOLIC BLOOD PRESSURE: 130 MMHG | WEIGHT: 171 LBS | TEMPERATURE: 98.1 F | BODY MASS INDEX: 29.19 KG/M2 | DIASTOLIC BLOOD PRESSURE: 80 MMHG | HEART RATE: 90 BPM | RESPIRATION RATE: 20 BRPM | HEIGHT: 64 IN | OXYGEN SATURATION: 98 %

## 2022-02-17 DIAGNOSIS — B02.29: ICD-10-CM

## 2022-02-17 PROCEDURE — 99213 OFFICE O/P EST LOW 20 MIN: CPT | Performed by: FAMILY MEDICINE

## 2022-02-17 NOTE — LETTER
February 17, 2022        Yola Campuzano  [address]  [address]  [place] [address]        Prescription:    Diagnosis B02.29-herpes radiculitis    Order for physical therapy, evaluate and treat for severe right upper extremity weakness secondary to herpes zoster radiculitis                Kaz Fernández M.D.    Electronically Signed

## 2022-02-18 NOTE — PROGRESS NOTES
"Subjective     Yola Campuzano is a 62 y.o. female who presents with Follow-Up            HPI 1.  Herpes zoster radiculitis-patient returns for follow-up after recently completing a 10-day course of prednisone.  She reports that pain in her right upper extremity right shoulder area has improved moderately.  Strength as improved only minimally.  2.  Skin boil-patient did complete course of recent oral antibiotics.  She reports no drainage or tenderness in her right axilla or right lateral chest at this time.    ROS negative for current fever, lower extremity numbness, lower extremity weakness           Objective     /80 (BP Location: Left arm, Patient Position: Sitting, BP Cuff Size: Adult)   Pulse 90   Temp 36.7 °C (98.1 °F)   Resp 20   Ht 1.626 m (5' 4.02\")   Wt 77.6 kg (171 lb)   LMP 12/18/2009   SpO2 98%   BMI 29.34 kg/m²      Physical Exam     General-alert cooperative female in mild distress  Upper extremities-Marked persistent weakness of her upper and lower arm with preserved  strength.  Healing light pink macules at the site of previous shingles rash noted over the upper arm and forearm  Skin-there is still a pink 5 mm thickening in the right posterior axillary area with no fluctuance or tenderness                Assessment & Plan        1. Herpes zoster radiculitis    - Referral to Neurology    Plan: 1.  Patient is about to start physical therapy for strengthening  2.  Neurology referral sent  3.  Follow-up in 1 month           "

## 2022-03-16 PROCEDURE — RXMED WILLOW AMBULATORY MEDICATION CHARGE: Performed by: INTERNAL MEDICINE

## 2022-03-17 ENCOUNTER — PHARMACY VISIT (OUTPATIENT)
Dept: PHARMACY | Facility: MEDICAL CENTER | Age: 62
End: 2022-03-17
Payer: COMMERCIAL

## 2022-03-17 ENCOUNTER — OFFICE VISIT (OUTPATIENT)
Dept: MEDICAL GROUP | Facility: MEDICAL CENTER | Age: 62
End: 2022-03-17
Attending: INTERNAL MEDICINE
Payer: MEDICAID

## 2022-03-17 VITALS
SYSTOLIC BLOOD PRESSURE: 134 MMHG | DIASTOLIC BLOOD PRESSURE: 80 MMHG | BODY MASS INDEX: 29.37 KG/M2 | HEIGHT: 64 IN | TEMPERATURE: 97.9 F | RESPIRATION RATE: 16 BRPM | OXYGEN SATURATION: 96 % | WEIGHT: 172 LBS | HEART RATE: 84 BPM

## 2022-03-17 DIAGNOSIS — Z12.31 SCREENING MAMMOGRAM, ENCOUNTER FOR: ICD-10-CM

## 2022-03-17 DIAGNOSIS — B02.29 CERVICAL RADICULOPATHY DUE TO HERPES ZOSTER: ICD-10-CM

## 2022-03-17 DIAGNOSIS — M54.12 CERVICAL RADICULOPATHY DUE TO HERPES ZOSTER: ICD-10-CM

## 2022-03-17 PROBLEM — R29.898 RIGHT ARM WEAKNESS: Status: ACTIVE | Noted: 2022-03-17

## 2022-03-17 PROCEDURE — 99213 OFFICE O/P EST LOW 20 MIN: CPT | Performed by: INTERNAL MEDICINE

## 2022-03-17 PROCEDURE — 99214 OFFICE O/P EST MOD 30 MIN: CPT | Performed by: INTERNAL MEDICINE

## 2022-03-17 NOTE — ASSESSMENT & PLAN NOTE
2 months ago, she developed a severe shingles outbreak over the C5 dermatome which left her with severe weakness in her right arm.  She reports that her strength is starting to come back but she still cannot abduct the arm or engage the deltoid at all.  Her biceps strength and triceps strength are improving but it is difficult for her to lift anything.  She also complains of some numbness and tingling as well as an itchy sensation over the area that was affected by the shingles and she still has a lot of scarring.

## 2022-03-17 NOTE — PROGRESS NOTES
Subjective:   Yola Campuzano is a 62 y.o. female here today for f/u R arm weakness    Cervical radiculopathy due to herpes zoster  2 months ago, she developed a severe shingles outbreak over the C5 dermatome which left her with severe weakness in her right arm.  She reports that her strength is starting to come back but she still cannot abduct the arm or engage the deltoid at all.  Her biceps strength and triceps strength are improving but it is difficult for her to lift anything.  She also complains of some numbness and tingling as well as an itchy sensation over the area that was affected by the shingles and she still has a lot of scarring.    She has not been able to start physical therapy yet.    Current medicines (including changes today)  Current Outpatient Medications   Medication Sig Dispense Refill   • VITAMIN D PO Take  by mouth.     • nicotine polacrilex (NICORETTE) 2 MG Gum Chew 1 PIECE BY MOUTH IF NEEDED FOR SMOKING CESSATION (Patient taking differently: Chew 1 PIECE BY MOUTH IF NEEDED FOR SMOKING CESSATION) 50 Each 3   • gabapentin (NEURONTIN) 400 MG Cap Take 1 Cap by mouth 3 times a day. 90 Capsule 5   • ibuprofen (MOTRIN) 600 MG Tab Take 1 tablet by mouth every 8 hours as needed for Moderate Pain. 60 Tablet 5   • ergocalciferol (DRISDOL) 55167 UNIT capsule Take 1 Cap by mouth every 14 days. 4 capsule 5   • Misc. Devices Misc Adjustable cane to use as directed 1 Device 0   • Lidocaine 4 % Gel Apply 1 Application topically 4 times a day as needed (burning/pain). (Patient not taking: No sig reported) 113 g 0     No current facility-administered medications for this visit.     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    She has no past medical history of Clotting disorder (HCC), COPD, or Cushings syndrome (HCC).         Objective:     Vitals:    03/17/22 1313   BP: 134/80   Pulse: 84   Resp: 16   Temp: 36.6 °C (97.9 °F)   SpO2: 96%     Body mass index is 29.51 kg/m².   Physical Exam:  Constitutional: Alert, no distress.  Skin: Warm, dry, good turgor, extensive scaring over C5 dermatome on right arm  Eye: Equal, round and reactive, conjunctiva clear, lids normal.  Neuro: Right upper extremity with 5/5  strength, 4+/5 biceps flexion, 5/5 triceps strength, 0/5 deltoid        Assessment and Plan:   The following treatment plan was discussed    1. Cervical radiculopathy due to herpes zoster  Per patient, her symptoms are improving although gradually.  She has regained some of the strength in the distal part of her arm.  We discussed that the process of healing for nerves can be quite slow and that it may be several more months before she feels completely improved.  We discussed the importance of establishing with physical therapy to continue to maintain her range of motion and work on strength.  We will see her back in 3 months for follow-up.  Discussed Shingrix vaccine in 1 year.    2. Screening mammogram, encounter for  - MA-SCREENING MAMMO BILAT W/TOMOSYNTHESIS W/CAD; Future        Followup: Return in about 3 months (around 6/17/2022), or if symptoms worsen or fail to improve.

## 2022-04-11 PROBLEM — M25.511 ACUTE PAIN OF RIGHT SHOULDER: Status: ACTIVE | Noted: 2022-04-11

## 2022-04-15 PROCEDURE — RXMED WILLOW AMBULATORY MEDICATION CHARGE: Performed by: INTERNAL MEDICINE

## 2022-04-25 ENCOUNTER — HOSPITAL ENCOUNTER (OUTPATIENT)
Dept: RADIOLOGY | Facility: MEDICAL CENTER | Age: 62
End: 2022-04-25
Attending: INTERNAL MEDICINE
Payer: MEDICAID

## 2022-04-25 DIAGNOSIS — Z12.31 SCREENING MAMMOGRAM, ENCOUNTER FOR: ICD-10-CM

## 2022-04-25 PROCEDURE — 77063 BREAST TOMOSYNTHESIS BI: CPT

## 2022-04-25 NOTE — PROGRESS NOTES
SUBJECTIVE: 57 y.o. female for annual routine gynecologic exam  Chief Complaint   Patient presents with   • Gynecologic Exam       Pt in clinic today for a well woman's exam and for a skin check. Pt stating that she has been feeling well. No vaginal DC/odor, urinary symptoms. Denies bloating, pelvic pain. Pt is not currently sexually active. Last pap smear: 2014, normal.  She is postmenopausal. She denies h/o abnormal pap smear. No h/o sexually transmitted infections. Current birth control/contraception method: N/A. Last mammogram: 2016. Pt does perform monthly self breast exams. No breast tenderness, mass, nipple discharge, changes in size or contour. Last DEXA: N/A. Colon ca screening: FIT 2015 which was positive. She was referred for a colonoscopy but did not follow through. She denies any bloody or black stools, weight loss, or persistent abdominal pain.     She was seen last week with an insect bite to her right inner ankle and treated with Bactrim. She states the redness has resolved and so has the pain that was radiating up her inner leg. She denies any fevers or chills. She states she got another bite last night on her finger.    When doing the clinical breast exam, she reported significant pain and inability to raise her left arm over head. She states that this pain came on a couple of months ago without injury. She first noticed pain when she was sleeping but states now her range of motion is limited and she cannot clasp her bra. She has not tried anything specifically for this problem. She does take ibuprofen occasionally for back pain and has not noticed a significant improvement. She notes that the pain radiates down the back of her arm. She also has a lump in her hand that is increasing in size and is bothersome for her.      Obstetric History       T0      L0     SAB2   TAB0   Ectopic0   Molar0   Multiple0   Live Births0         She  reports that she has been smoking  "Cigarettes.  She has a 35.00 pack-year smoking history. She has never used smokeless tobacco.           ROS:    No abdominal pain, change in bowel habits, black or bloody stools.    No unusual headaches, no visual changes  No prolonged cough. No dyspnea or chest pain on exertion.  No depression, labile mood, anxiety ,libido changes, insomnia.  No new/concerning skin lesions    Further ROS as documented above in my HPI    Preventive Care:  Health Maintenance Topics with due status: Overdue       Topic Date Due    COLON CANCER SCREENING ANNUAL FIT 12/15/2016    PAP SMEAR 04/15/2017       Current medications, allergies, and problem list reviewed with patient and updated in [facility].    Family History:   Family History   Problem Relation Age of Onset   • Arthritis Mother    • Diabetes Father    • Hypertension Father    • Heart Attack Father    • Heart Disease Maternal Grandmother    • Lung Disease Maternal Grandfather           OBJECTIVE:   /66   Pulse 80   Temp 35.9 °C (96.6 °F)   Resp 20   Ht 1.626 m (5' 4.02\")   Wt 73 kg (161 lb)   LMP 12/18/2009   SpO2 96%   Breastfeeding? No   BMI 27.62 kg/m²   Body mass index is 27.62 kg/m².    Head and neck:  Neck supple.  Neuro: Alert and oriented.  Chest: Regular respiratory rate and effort.  Heart:  Regular rate and rhythm.   Abdomen:  Soft without tenderness, guarding, mass or organomegaly.  No CVA tenderness or inguinal adenopathy.   Extremities:  Extremities, reflexes and peripheral pulses are normal.   Skin: color normal, vascularity normal, no edema, temperature normal   No rashes or suspicious skin lesions noted.  Breast Exam: Breast exam completed. Symmetrical. No dimpling. No nodules palpated.  No axillary/supraclavicular lymphadenopathy  Pelvic Exam -  Normal external genitalia with no lesions. Normal vaginal mucosa with normal rugation and no discharge. Cervix with no visible lesions. No cervical motion tenderness. Uterus is normal sized with no masses. No " adnexal tenderness or enlargement appreciated. specimen obtained and sent to lab.  MSK: limited ROM in right shoulder in passive and active abduction and external rotation. No crepitus noted.       Assessment and Plan    1. Routine gynecological examination     2. Screening for cervical cancer  THINPREP PAP WITH HPV   3. Insect bite, subsequent encounter  The signs of infection have resolved with the course of the Bactrim. She was encouraged to continue to monitor the area and to avoid picking at it. She may cover with a Band-Aid if necessary.   She was encouraged to contact the  for extermination as it sounds as if she has an insect problem within her apartment.      4. Acute pain of left shoulder  She presents with atraumatic left shoulder pain it has been ongoing for 2 months.  We will obtain an x-ray for further evaluation and refer to physical therapy.  She was encouraged to increase her Motrin 600 mg to 3 times a day scheduled with meals for the next 5 days and then decrease to when necessary. She voiced understanding.    DX-SHOULDER 2+ LEFT    REFERRAL TO PHYSICAL THERAPY Reason for Therapy: Eval/Treat/Report       She was encouraged to submit her that test and complete her past labs provided. She still has the requisitions.    Follow-up: routine FU as scheduled.     Current screening recommendations reviewed with patient   Fever greater than (need to indicate Fahrenheit or Celsius)/Wound/Surgical Site with redness, or foul smelling discharge or pus Bleeding that does not stop/Pain not relieved by Medications/Fever greater than (need to indicate Fahrenheit or Celsius)/Wound/Surgical Site with redness, or foul smelling discharge or pus/Nausea and vomiting that does not stop/Unable to urinate

## 2022-04-26 PROCEDURE — RXMED WILLOW AMBULATORY MEDICATION CHARGE: Performed by: ORTHOPAEDIC SURGERY

## 2022-05-02 ENCOUNTER — PHARMACY VISIT (OUTPATIENT)
Dept: PHARMACY | Facility: MEDICAL CENTER | Age: 62
End: 2022-05-02
Payer: COMMERCIAL

## 2022-05-26 PROCEDURE — RXMED WILLOW AMBULATORY MEDICATION CHARGE: Performed by: INTERNAL MEDICINE

## 2022-06-03 ENCOUNTER — PHARMACY VISIT (OUTPATIENT)
Dept: PHARMACY | Facility: MEDICAL CENTER | Age: 62
End: 2022-06-03
Payer: COMMERCIAL

## 2022-06-15 ENCOUNTER — OFFICE VISIT (OUTPATIENT)
Dept: MEDICAL GROUP | Facility: MEDICAL CENTER | Age: 62
End: 2022-06-15
Attending: INTERNAL MEDICINE
Payer: MEDICAID

## 2022-06-15 VITALS
HEART RATE: 82 BPM | SYSTOLIC BLOOD PRESSURE: 112 MMHG | DIASTOLIC BLOOD PRESSURE: 68 MMHG | WEIGHT: 169 LBS | TEMPERATURE: 98.1 F | HEIGHT: 64 IN | RESPIRATION RATE: 16 BRPM | BODY MASS INDEX: 28.85 KG/M2 | OXYGEN SATURATION: 93 %

## 2022-06-15 DIAGNOSIS — M54.50 CHRONIC BILATERAL LOW BACK PAIN, UNSPECIFIED WHETHER SCIATICA PRESENT: Primary | ICD-10-CM

## 2022-06-15 DIAGNOSIS — M54.50 CHRONIC BILATERAL LOW BACK PAIN, UNSPECIFIED WHETHER SCIATICA PRESENT: ICD-10-CM

## 2022-06-15 DIAGNOSIS — R74.8 ELEVATED ALKALINE PHOSPHATASE LEVEL: ICD-10-CM

## 2022-06-15 DIAGNOSIS — E78.5 DYSLIPIDEMIA: ICD-10-CM

## 2022-06-15 DIAGNOSIS — M25.511 CHRONIC RIGHT SHOULDER PAIN: ICD-10-CM

## 2022-06-15 DIAGNOSIS — M85.80 OSTEOPENIA, UNSPECIFIED LOCATION: ICD-10-CM

## 2022-06-15 DIAGNOSIS — G89.29 CHRONIC BILATERAL LOW BACK PAIN, UNSPECIFIED WHETHER SCIATICA PRESENT: Primary | ICD-10-CM

## 2022-06-15 DIAGNOSIS — G89.29 CHRONIC BILATERAL LOW BACK PAIN, UNSPECIFIED WHETHER SCIATICA PRESENT: ICD-10-CM

## 2022-06-15 DIAGNOSIS — Z13.1 SCREENING FOR DIABETES MELLITUS: ICD-10-CM

## 2022-06-15 DIAGNOSIS — G89.29 CHRONIC RIGHT SHOULDER PAIN: ICD-10-CM

## 2022-06-15 PROCEDURE — 99214 OFFICE O/P EST MOD 30 MIN: CPT | Performed by: INTERNAL MEDICINE

## 2022-06-15 PROCEDURE — RXMED WILLOW AMBULATORY MEDICATION CHARGE: Performed by: INTERNAL MEDICINE

## 2022-06-15 PROCEDURE — 99213 OFFICE O/P EST LOW 20 MIN: CPT | Performed by: INTERNAL MEDICINE

## 2022-06-15 RX ORDER — ERGOCALCIFEROL 1.25 MG/1
50000 CAPSULE ORAL
Qty: 4 CAPSULE | Refills: 5 | Status: SHIPPED | OUTPATIENT
Start: 2022-06-15

## 2022-06-15 RX ORDER — IBUPROFEN 600 MG/1
600 TABLET ORAL EVERY 8 HOURS PRN
Qty: 60 TABLET | Refills: 5 | Status: SHIPPED | OUTPATIENT
Start: 2022-06-15 | End: 2023-04-25 | Stop reason: SDUPTHER

## 2022-06-15 RX ORDER — ACETAMINOPHEN 500 MG
500-1000 TABLET ORAL EVERY 6 HOURS PRN
Qty: 60 TABLET | Refills: 2 | Status: SHIPPED | OUTPATIENT
Start: 2022-06-15 | End: 2022-10-19 | Stop reason: SDUPTHER

## 2022-06-15 RX ORDER — GABAPENTIN 400 MG/1
400 CAPSULE ORAL 3 TIMES DAILY
Qty: 90 CAPSULE | Refills: 5 | Status: SHIPPED | OUTPATIENT
Start: 2022-06-15 | End: 2023-01-19 | Stop reason: SDUPTHER

## 2022-06-15 RX ORDER — CALCIUM CARBONATE 500(1250)
500 TABLET ORAL 2 TIMES DAILY WITH MEALS
Qty: 60 TABLET | Refills: 5 | Status: SHIPPED | OUTPATIENT
Start: 2022-06-15

## 2022-06-15 ASSESSMENT — PATIENT HEALTH QUESTIONNAIRE - PHQ9
SUM OF ALL RESPONSES TO PHQ QUESTIONS 1-9: 8
CLINICAL INTERPRETATION OF PHQ2 SCORE: 2
5. POOR APPETITE OR OVEREATING: 0 - NOT AT ALL

## 2022-06-15 NOTE — TELEPHONE ENCOUNTER
Lab Results   Component Value Date/Time    CHOLSTRLTOT 196 08/05/2020 10:45 AM     (H) 08/05/2020 10:45 AM    HDL 40 08/05/2020 10:45 AM    TRIGLYCERIDE 123 08/05/2020 10:45 AM       Lab Results   Component Value Date/Time    SODIUM 140 08/05/2020 10:45 AM    POTASSIUM 4.8 08/05/2020 10:45 AM    CHLORIDE 103 08/05/2020 10:45 AM    CO2 24 08/05/2020 10:45 AM    GLUCOSE 97 08/05/2020 10:45 AM    BUN 18 08/05/2020 10:45 AM    CREATININE 0.62 08/05/2020 10:45 AM     Lab Results   Component Value Date/Time    ALKPHOSPHAT 120 (H) 08/05/2020 10:45 AM    ASTSGOT 18 08/05/2020 10:45 AM    ALTSGPT 17 08/05/2020 10:45 AM    TBILIRUBIN 0.6 08/05/2020 10:45 AM

## 2022-06-16 PROBLEM — B02.29: Status: RESOLVED | Noted: 2022-03-17 | Resolved: 2022-06-16

## 2022-06-16 PROBLEM — M54.12: Status: RESOLVED | Noted: 2022-03-17 | Resolved: 2022-06-16

## 2022-06-17 NOTE — ASSESSMENT & PLAN NOTE
She is currently taking supplemental vitamin D 50,000 units weekly.  She is requesting a prescription for calcium.

## 2022-06-17 NOTE — ASSESSMENT & PLAN NOTE
She continues to report pain in her right deltoid and biceps region.  She has limited range of motion.  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  She did have an MRI of the shoulder which did not show any rotator cuff damage but she did have a little bit of tendinitis.  She saw an orthopedist who offered her an injection which she declined.  She was told to follow-up with physical therapy but has not done so.

## 2022-06-17 NOTE — ASSESSMENT & PLAN NOTE
The 10-year ASCVD risk score (Houghtonmartha COREY Jr., et al., 2013) is: 7.4%  She is due for updated labs.  She is not currently on any lipid-lowering medication.

## 2022-06-22 PROCEDURE — RXMED WILLOW AMBULATORY MEDICATION CHARGE: Performed by: INTERNAL MEDICINE

## 2022-06-29 ENCOUNTER — PHARMACY VISIT (OUTPATIENT)
Dept: PHARMACY | Facility: MEDICAL CENTER | Age: 62
End: 2022-06-29
Payer: COMMERCIAL

## 2022-06-29 ENCOUNTER — PHYSICAL THERAPY (OUTPATIENT)
Dept: PHYSICAL THERAPY | Facility: REHABILITATION | Age: 62
End: 2022-06-29
Attending: INTERNAL MEDICINE
Payer: MEDICAID

## 2022-06-29 DIAGNOSIS — M25.511 CHRONIC RIGHT SHOULDER PAIN: ICD-10-CM

## 2022-06-29 DIAGNOSIS — G89.29 CHRONIC BILATERAL LOW BACK PAIN, UNSPECIFIED WHETHER SCIATICA PRESENT: ICD-10-CM

## 2022-06-29 DIAGNOSIS — G89.29 CHRONIC RIGHT SHOULDER PAIN: ICD-10-CM

## 2022-06-29 DIAGNOSIS — M54.50 CHRONIC BILATERAL LOW BACK PAIN, UNSPECIFIED WHETHER SCIATICA PRESENT: ICD-10-CM

## 2022-06-29 PROCEDURE — 97161 PT EVAL LOW COMPLEX 20 MIN: CPT

## 2022-06-29 SDOH — ECONOMIC STABILITY: GENERAL: QUALITY OF LIFE: FAIR

## 2022-06-29 ASSESSMENT — ENCOUNTER SYMPTOMS
PAIN SCALE AT HIGHEST: 8
PAIN SCALE: 2
PAIN SCALE AT LOWEST: 0

## 2022-06-29 NOTE — OP THERAPY EVALUATION
Outpatient Physical Therapy  INITIAL EVALUATION    [facility] Physical Therapy [facility]  [address]  [address]  [place] [address]  Phone:  [phone]  Fax:  [phone]    Date of Evaluation: 06/29/2022    Patient: Yola Campuzano  YOB: 1960  MRN: [id number]     Referring Provider: Vannesa Bernard M.D.  [address]  [place] [address]   Referring Diagnosis Chronic right shoulder pain [M25.511, G89.29];Chronic bilateral low back pain, unspecified whether sciatica present [M54.50, G89.29]     Time Calculation  Start time: 1115  Stop time: 1200 Time Calculation (min): 45 minutes     Chief Complaint: No chief complaint on file.    Visit Diagnoses     ICD-10-CM   1. Chronic right shoulder pain  M25.511    G89.29   2. Chronic bilateral low back pain, unspecified whether sciatica present  M54.50    G89.29       Date of onset of impairment: No data found    Subjective:   History of Present Illness:     Mechanism of injury:  Patient reports that she started getting shoulder pain the day after jackie trying to catch an object overhead, and got front of shoulder.  Patient believed she had broken it.  Patient then reports she got shingles.  Patient reports she can't hold a cup a coffee, can't lift her arm.  Reports that should is painful to touch her face, resulting in shoulder pain.   Feels cramped up.  Patient reports before the shingles she had a pain on the left side, in the same area.      Pain Description: Lateral shoulder feels like a tightening, reports that she feels like there is a band around the arm.    Pain Irritability:  15 minutes soreness.   Pain AM/PM Pattern:  Worse in the morning (takes 2.5 hours to warm up)    Better:  Nothing   Worse: Sleeping (worse on it), reaching, and getting dressed.   Tried:  Ibuprofen (doesn't help)    X-ray and MRI were negative.     PMHx - denies any issue of neck issues.  But reports she does have some upper back soreness. Reports touching  certain spots on back that recreates symptoms.   Quality of life:  Fair  Pain:     Current pain ratin    At best pain ratin    At worst pain ratin      Past Medical History:   Diagnosis Date   • Anxiety    • Carpal tunnel syndrome on right 10/30/2012   • Depression    • Dyslipidemia 2012   • Dyslipidemia 2012   • History of Guillain-Magna syndrome 2016   • Hypertension    • Osteopenia 2012   • Substance abuse (HCC)    • Tobacco dependence 2012   • Tuberculosis     was dx in a rehab ceneter in    • Urinary tract infection, site not specified    • Vitamin d deficiency 2012     Past Surgical History:   Procedure Laterality Date   • OPEN REDUCTION  1991    left ankle     Social History     Tobacco Use   • Smoking status: Current Every Day Smoker     Packs/day: 0.50     Years: 45.00     Pack years: 22.50     Types: Cigarettes   • Smokeless tobacco: Never Used   • Tobacco comment: previous 1 ppd for many years   Substance Use Topics   • Alcohol use: Yes     Comment: previous heavy alchol use, 1/2 gallon hard alcohol daily or 12 beers for 10 years, cut back years ago     Family and Occupational History     Socioeconomic History   • Marital status: Single     Spouse name: Not on file   • Number of children: Not on file   • Years of education: Not on file   • Highest education level: Not on file   Occupational History   • Not on file       Objective     Postural Observations    Additional Postural Observation Details  Forward head and shoulders     Cervical Screen    Cervical range of motion within normal limits with the following exceptions: Lack of R c/s rotation, 25%.  Otherwise WNL.  Thoracic Screen    Thoracic range of motion within normal limits    Palpation     Additional Palpation Details  Global tenderness over shoulder, however infraspinatus most tender, some referral down arm, but not into painful area    Active Range of Motion     Right Shoulder   Flexion: 80  degrees with pain  Extension: 30 degrees   Abduction: 70 degrees with pain  External rotation 0°: WFL  External rotation 90°: Right shoulder active external rotation at 90 degrees: open, unable.with pain  Internal rotation 0°: WFL    Passive Range of Motion     Right Shoulder   Flexion: 100 (muscle guarding) degrees with pain  Abduction: 70 (muscle guarding) degrees with pain    Scapular Mobility   Left Shoulder   Scapular mobility: poor    Right Shoulder   Scapular mobility: poor    Additional Scapular Mobility Details    Shrugging mechanics - max education about reducing, no verbalization of understanding    Joint Play     Additional shoulder joint play details: Not tested today, due to > passive than active ROM.  Will eventually assess         Strength:      Right Shoulder   Planes of Motion   Flexion: 3   Extension: 4-   Abduction: 3   External rotation at 0°: 4-   External rotation at 90°: 3+     Tests     Right Shoulder   Negative belly press, external rotation lag sign, Emiliana/Meza and Spurling's sign.         Therapeutic Exercises (CPT 03109):       Therapeutic Exercise Summary:   HEP initiated.  W's 5 x 10 seconds  Reaching below 90 all planes     Significant education, patient does not verbalize understanding, and discusses activities that she was doing on her own.  Denies improvement with her own exercises at this time, reports band broke, so she stopped doing them.       Time-based treatments/modalities:           Assessment, Response and Plan:   Impairments: lacks appropriate home exercise program and limited mobility    Assessment details:  Patient demonstrate signs and symptoms that are consistent with mechanical shoulder pain including poor RTC stability, poor scapular mobility, poor global glenohumeral mobility.  Patient had difficulty following cues during evaluation, and is somewhat self directed during evaluation.  This increases difficulty of diagnosis and low likelihood of follow through with  prescribed activities.  This worsens patient's prognosis.  Current impairments include strength, ROM, balance, activity tolerance, proprioception, and pain which are all negatively impacting functional mobility.  PT will continue to gradually evaluate as patient tolerates more activity.  Focus of therapy will be initially isometric drive strength training, progressive strengthening and mobility to tolerance, scapular mechanics.  Prognosis comment:  Poor-fair  Prognosis details:  Dependent on compliant and communication.  At this time longstanding compensation pattern with poor shoulder mobility.  Will require time and consistency.   Goals:   Short Term Goals:   Compliant with HEP  25% improvement in shoulder ROM in order to improve independence with ADLs  1/2 grade improvement in MMT to participate in ADLs  Short term goal time span:  2-4 weeks      Long Term Goals:    Independent with HEP  50% improvement in shoulder ROM in order to improve independence with ADLs  1 grade improvement in MMT to participate in ADLs  Long term goal time span:  6-8 weeks    Plan:   Therapy options:  Physical therapy treatment to continue  Planned therapy interventions:  Neuromuscular Re-education (CPT 81287) and Therapeutic Exercise (CPT 57632)  Other planned therapy interventions:  3TE, 1NM  Frequency:  2x week  Duration in weeks:  6  Duration in visits:  12  Discussed with:  Patient    Functional Assessment Used  Quickdash General Total Score: 61.36     Referring provider co-signature:  I have reviewed this plan of care and my co-signature certifies the need for services.    Certification Period: 06/29/2022 to  08/17/22    Physician Signature: ________________________________ Date: ______________

## 2022-07-14 ENCOUNTER — APPOINTMENT (OUTPATIENT)
Dept: PHYSICAL THERAPY | Facility: REHABILITATION | Age: 62
End: 2022-07-14
Attending: INTERNAL MEDICINE
Payer: MEDICAID

## 2022-07-22 ENCOUNTER — APPOINTMENT (OUTPATIENT)
Dept: PHYSICAL THERAPY | Facility: REHABILITATION | Age: 62
End: 2022-07-22
Attending: INTERNAL MEDICINE
Payer: MEDICAID

## 2022-07-22 NOTE — OP THERAPY DAILY TREATMENT
Outpatient Physical Therapy  DAILY TREATMENT     [facility] [address]  [address]  [place] [address]  Phone:  [phone]  Fax:  [phone]    Date: 07/22/2022    Patient: Yola Campuzano  YOB: 1960  MRN: [id number]     Time Calculation                   Chief Complaint: No chief complaint on file.    Visit #: 2    SUBJECTIVE:  Pt reports    OBJECTIVE:  Current objective measures:   From Eval:   Right Shoulder   Flexion: 80 degrees with pain  Extension: 30 degrees   Abduction: 70 degrees with pain  External rotation 0°: WFL  External rotation 90°: Right shoulder active external rotation at 90 degrees: open, unable.with pain  Internal rotation 0°: WFL          Therapeutic Exercises (CPT 01402):       Therapeutic Exercise Summary:   HEP initiated.  W's 5 x 10 seconds  Reaching below 90 all planes     Significant education, patient does not verbalize understanding, and discusses activities that she was doing on her own.  Denies improvement with her own exercises at this time, reports band broke, so she stopped doing them.       Time-based treatments/modalities:           Pain rating (1-10) before treatment:    Pain rating (1-10) after treatment:      ASSESSMENT:   Response to treatment:     PLAN/RECOMMENDATIONS:   Plan for treatment: therapy treatment to continue next visit.  Planned interventions for next visit: continue with current treatment.      [] As the licensed therapist supervising this student, I was present during the entire treatment session directing the care and reviewing the assessment plan.  I reviewed all documentation prior to signing.

## 2022-07-27 ENCOUNTER — APPOINTMENT (OUTPATIENT)
Dept: PHYSICAL THERAPY | Facility: REHABILITATION | Age: 62
End: 2022-07-27
Attending: INTERNAL MEDICINE
Payer: MEDICAID

## 2022-07-27 NOTE — OP THERAPY DAILY TREATMENT
Outpatient Physical Therapy  DAILY TREATMENT     [facility] [address]  [address]  [place] [address]  Phone:  [phone]  Fax:  [phone]    Date: 07/27/2022    Patient: Yola Campuzano  YOB: 1960  MRN: [id number]     Time Calculation               Chief Complaint: No chief complaint on file.    Visit #: 2    SUBJECTIVE:  ***    Evaluation done on 6/29/22    OBJECTIVE:  ***        Exercises/Treatment  Time-based treatments/modalities:           ASSESSMENT:   ***    PLAN/RECOMMENDATIONS:   Isometric RTC strength training  Scapular mechanics

## 2022-08-16 ENCOUNTER — PHARMACY VISIT (OUTPATIENT)
Dept: PHARMACY | Facility: MEDICAL CENTER | Age: 62
End: 2022-08-16
Payer: COMMERCIAL

## 2022-08-16 PROCEDURE — RXMED WILLOW AMBULATORY MEDICATION CHARGE: Performed by: INTERNAL MEDICINE

## 2022-09-02 ENCOUNTER — APPOINTMENT (OUTPATIENT)
Dept: PHYSICAL THERAPY | Facility: REHABILITATION | Age: 62
End: 2022-09-02
Attending: INTERNAL MEDICINE
Payer: MEDICAID

## 2022-09-07 ENCOUNTER — PHYSICAL THERAPY (OUTPATIENT)
Dept: PHYSICAL THERAPY | Facility: REHABILITATION | Age: 62
End: 2022-09-07
Attending: INTERNAL MEDICINE
Payer: MEDICAID

## 2022-09-07 DIAGNOSIS — G89.29 CHRONIC RIGHT SHOULDER PAIN: ICD-10-CM

## 2022-09-07 DIAGNOSIS — M25.511 CHRONIC RIGHT SHOULDER PAIN: ICD-10-CM

## 2022-09-07 PROCEDURE — 97164 PT RE-EVAL EST PLAN CARE: CPT

## 2022-09-07 PROCEDURE — 97112 NEUROMUSCULAR REEDUCATION: CPT

## 2022-09-07 NOTE — OP THERAPY DAILY TREATMENT
"  Outpatient Physical Therapy  DAILY TREATMENT     [facility] Physical Therapy [address]  [address]  [place] [address]  Phone:  [phone]  Fax:  [phone]    Date: 09/07/2022    Patient: Yola Campuzano  YOB: 1960  MRN: [id number]     Time Calculation    Start time: 0900  Stop time: 0945 Time Calculation (min): 45 minutes       Chief Complaint: Shoulder Problem    Visit #: 2    SUBJECTIVE:  Pt. States she has been absent from PT d/t getting covid.  She did Ty's,PT  exercises for a while then let them go.  Pt. Is still getting pain with functional reach and sleep.  She reports being is no better or worse. MD f/u in October.     Pain rating (1-10) before treatment:  3, IM  Pain rating (1-10) after treatment:  3  Pain Description: Lateral shoulder feels like a tightening, reports that she feels like there is a band around the arm.    Pain Irritability:  15 minutes soreness.   Pain AM/PM Pattern:  Worse in the morning (takes 2.5 hours to warm up)  Better:  Nothing   Worse: Sleeping (worse on it), reaching, and getting dressed.     OBJECTIVE:  Current objective measures:  AROM  R SH elevation early scap protract and upward rotation w/pain.  R SH F to 90deg. ABD: 45 deg. P+  R SH ER 40 deg. P+  Strength  R SH (grossly) 3-3+/5        Therapeutic Exercises (CPT 32356):     1. sit scap retract, 10    2. sit scap retract w/arm elevation, 5    3. sidelying SH flexion, 2x10, NMES    4. sidelying SH scaption, 2x10, NMES      Therapeutic Exercise Summary: HEP as above    Therapeutic Treatments and Modalities:     1. E Stim Attended (CPT 09635), R rhomboids/m. traps, russian stim 10\"on/off w/ther ex  Time-based treatments/modalities:    Physical Therapy Timed Treatment Charges  Neuromusc re-ed, balance, coor, post minutes (CPT 17483): 15 minutes  ASSESSMENT:   Response to treatment: mechanical shoulder pain including poor RTC stability, scapular dysfunction, poor global glenohumeral " mobility.        PLAN/RECOMMENDATIONS:   Plan for treatment:  recommend continue PT.  Awaiting authorization from MD .  Planned interventions for next visit: continue with current treatment. Pt. May need MT prior to TE.

## 2022-09-07 NOTE — OP THERAPY PROGRESS SUMMARY
Outpatient Physical Therapy  RE-EVALUATION NOTE      [facility] Physical Therapy [facility]  [address]  [address]  [place] [address]  Phone:  [phone]  Fax:  [phone]    Date of Visit: 09/07/2022    Patient: Yola Campuzano  YOB: 1960  MRN: [id number]     Referring Provider: Vannesa Bernard M.D.  [address]  [address]  [place] [address]   Referring Diagnosis Low back pain, unspecified [M54.50];Other chronic pain [G89.29];Pain in right shoulder [M25.511];Other chronic pain [G89.29]     Visit Diagnoses     ICD-10-CM   1. Chronic right shoulder pain  M25.511    G89.29       Rehab Potential: good    Progress Report Period: 7/12/22-09/08/22    Functional Assessment Used          Objective Findings and Assessment:   Patient progression towards goals: STG  Compliant w/HEP - Pt. Was compliant, but stopped after 2 months  Increase SH AROM by 25% to increase I w/ADLs - Not met  Increase SH strength by 1/2 mmt grade - Not Met  LTG  Increase SH AROM by 50% to increase I w/ADLs   Increase SH strength 1MMT grade to do functional reaching    Objective findings and assessment details: AROM SH  Comments: R SH elevation - early scapula upward rotation w/pain  R flexion: 90 deg  R ABD 45 deg P+  R ER 40 deg. P+  Strength  R SH (grossly) 3-3+/5 MMT    Goals:   Short Term Goals:   Compliant w/HEP  AROM SH F to 125, and ABD to 90 deg.   Increase SH strength by 1/2 mmt grade  Short term goal time span:  2-4 weeks      Long Term Goals:    Reach in multiple planes for functonal reach  Increase strength of SH by 1 MMT grade  I Hep-progressed /selfcare  Long term goal time span:  6-8 weeks    Plan:   Planned therapy interventions:  Self Care ADL Training (CPT 37563), Therapeutic Exercise (CPT 17897) and Manual Therapy (CPT 53940)  Frequency:  2x week  Duration in visits:  8    Referring provider co-signature:  I have reviewed this plan of care and my co-signature certifies the need for services.      Certification Period: 09/07/2022 to 11/02/22    Physician Signature: ________________________________ Date: ______________

## 2022-09-13 ENCOUNTER — APPOINTMENT (OUTPATIENT)
Dept: PHYSICAL THERAPY | Facility: REHABILITATION | Age: 62
End: 2022-09-13
Attending: INTERNAL MEDICINE
Payer: MEDICAID

## 2022-09-15 PROCEDURE — RXMED WILLOW AMBULATORY MEDICATION CHARGE: Performed by: INTERNAL MEDICINE

## 2022-09-20 ENCOUNTER — PHYSICAL THERAPY (OUTPATIENT)
Dept: PHYSICAL THERAPY | Facility: REHABILITATION | Age: 62
End: 2022-09-20
Attending: INTERNAL MEDICINE
Payer: MEDICAID

## 2022-09-20 ENCOUNTER — PHARMACY VISIT (OUTPATIENT)
Dept: PHARMACY | Facility: MEDICAL CENTER | Age: 62
End: 2022-09-20
Payer: COMMERCIAL

## 2022-09-20 DIAGNOSIS — M25.511 CHRONIC RIGHT SHOULDER PAIN: ICD-10-CM

## 2022-09-20 DIAGNOSIS — G89.29 CHRONIC RIGHT SHOULDER PAIN: ICD-10-CM

## 2022-09-20 PROCEDURE — 97112 NEUROMUSCULAR REEDUCATION: CPT

## 2022-09-20 PROCEDURE — 97140 MANUAL THERAPY 1/> REGIONS: CPT

## 2022-09-20 PROCEDURE — 97110 THERAPEUTIC EXERCISES: CPT

## 2022-09-20 NOTE — OP THERAPY DAILY TREATMENT
"  Outpatient Physical Therapy  DAILY TREATMENT     [facility] Physical Therapy [address]  [address]  [place] [address]  Phone:  [phone]  Fax:  [phone]    Date: 09/20/2022    Patient: Yola Campuzano  YOB: 1960  MRN: [id number]     Time Calculation      945  1030  45 minutes         Chief Complaint: Shoulder Problem    Visit #: 3    SUBJECTIVE:  Pt. States that after the e-stim she was doing desk work and had trouble with her vision.  Pt. Still gets the the anterolat. Sh pain Im.     Pain rating (1-10) before treatment:  3, IM  Pain rating (1-10) after treatment:  0  Pain Location: R Lateral shoulder  Pain Description:  feels like a tightening, reports that she feels like there is a band around the arm.   OBJECTIVE:  Current objective measures: -        Therapeutic Exercises (CPT 75418):     1. S/L scap retract w/ABD, 10, VC/TCs    2. supine scap retract w/arm elevation, 10, VC/TCs    3. Sh Clocks, 10 ea.    5. standing scap stab -hand on ball on table, 1x10 2\", w/slide 1x10, VC/TCs      Therapeutic Exercise Summary: HEP Scap retract w/arm elevation    Therapeutic Treatments and Modalities:     1. Neuromuscular Re-education (CPT 88439), stm/mfr to R jamie sh. TC/VC during TE for correct scapulae mechanics      Time-based treatments/modalities:    Physical Therapy Timed Treatment Charges  Neuromusc re-ed, balance, coor, post minutes (CPT 71819): 15 minutes  Therapeutic exercise minutes (CPT 70534): 25 minutes      ASSESSMENT:   Response to treatment: well with better shoulder mechanics upon prompting and after scap. Stab exercise.    PLAN/RECOMMENDATIONS:   Plan for treatment: therapy treatment to continue next visit.  Planned interventions for next visit: continue with current treatment.         "

## 2022-09-27 ENCOUNTER — APPOINTMENT (OUTPATIENT)
Dept: PHYSICAL THERAPY | Facility: REHABILITATION | Age: 62
End: 2022-09-27
Attending: INTERNAL MEDICINE
Payer: MEDICAID

## 2022-10-19 ENCOUNTER — OFFICE VISIT (OUTPATIENT)
Dept: MEDICAL GROUP | Facility: MEDICAL CENTER | Age: 62
End: 2022-10-19
Attending: INTERNAL MEDICINE
Payer: MEDICAID

## 2022-10-19 ENCOUNTER — PHARMACY VISIT (OUTPATIENT)
Dept: PHARMACY | Facility: MEDICAL CENTER | Age: 62
End: 2022-10-19
Payer: COMMERCIAL

## 2022-10-19 VITALS
HEIGHT: 64 IN | DIASTOLIC BLOOD PRESSURE: 82 MMHG | TEMPERATURE: 97.1 F | BODY MASS INDEX: 28.68 KG/M2 | SYSTOLIC BLOOD PRESSURE: 122 MMHG | WEIGHT: 168 LBS | RESPIRATION RATE: 16 BRPM | HEART RATE: 72 BPM | OXYGEN SATURATION: 95 %

## 2022-10-19 DIAGNOSIS — Z23 NEED FOR VACCINATION: ICD-10-CM

## 2022-10-19 DIAGNOSIS — G89.29 CHRONIC RIGHT SHOULDER PAIN: ICD-10-CM

## 2022-10-19 DIAGNOSIS — N39.46 MIXED INCONTINENCE URGE AND STRESS: ICD-10-CM

## 2022-10-19 DIAGNOSIS — M54.50 CHRONIC BILATERAL LOW BACK PAIN, UNSPECIFIED WHETHER SCIATICA PRESENT: ICD-10-CM

## 2022-10-19 DIAGNOSIS — G89.29 CHRONIC BILATERAL LOW BACK PAIN, UNSPECIFIED WHETHER SCIATICA PRESENT: ICD-10-CM

## 2022-10-19 DIAGNOSIS — M25.511 CHRONIC RIGHT SHOULDER PAIN: ICD-10-CM

## 2022-10-19 PROCEDURE — RXMED WILLOW AMBULATORY MEDICATION CHARGE: Performed by: INTERNAL MEDICINE

## 2022-10-19 PROCEDURE — 99214 OFFICE O/P EST MOD 30 MIN: CPT | Performed by: INTERNAL MEDICINE

## 2022-10-19 PROCEDURE — 90471 IMMUNIZATION ADMIN: CPT

## 2022-10-19 PROCEDURE — 99213 OFFICE O/P EST LOW 20 MIN: CPT | Performed by: INTERNAL MEDICINE

## 2022-10-19 RX ORDER — ACETAMINOPHEN 500 MG
500-1000 TABLET ORAL EVERY 6 HOURS PRN
Qty: 60 TABLET | Refills: 5 | Status: SHIPPED | OUTPATIENT
Start: 2022-10-19

## 2022-10-19 NOTE — PROGRESS NOTES
Subjective:   Yola Campuzano is a 62 y.o. female here today for f/u back and shoulder pain, urinary issues    Mixed incontinence urge and stress  She states that she is having occasional episodes of urinary incontinence.  She noticed that today when she was bending over, she was leaking several drops of urine.  It was not enough to need to change her pants.  She did not feel like her bladder was full at the time.  Also states that if she wakes up in the middle the night and has to urinate, if she does not get to the bathroom immediately she has an accident.  This is less common during the day but occasionally will happen.  She reports minimal urinary leakage with coughing, sneezing, bearing down.  She denies dysuria, pelvic pain.  At this time, she desires to avoid medication.     Chronic right shoulder pain  She continues to work with physical therapy.  She had 2 appointments in June and she has had 2 appointments in September.  She is noticing improvement in range of motion especially with forward flexion of her shoulder but she feels like her progress has been slow.    Low back pain  She reports that her low back continues to be painful on a daily basis.  Today she was helping her friend clean out a storage unit and she was bending and lifting for about 3 hours and now she is having significant discomfort.  She reports that the muscles feel tight.  She continues to take ibuprofen and Tylenol with mild relief.  Physical therapy has not started working with her on her back yet.       Current medicines (including changes today)  Current Outpatient Medications   Medication Sig Dispense Refill    acetaminophen (TYLENOL) 500 MG Tab Take 1-2 Tablets by mouth every 6 hours as needed for Moderate Pain. 60 Tablet 5    calcium carbonate (OS-GLENN 500) 500 MG Tab Take 1 Tablet by mouth 2 times a day with meals. 60 Tablet 5    ergocalciferol (DRISDOL) 42361 UNIT capsule Take 1 Capsule by mouth every 14 days. 4 Capsule 5     gabapentin (NEURONTIN) 400 MG Cap Take 1 capsule by mouth 3 times a day. 90 Capsule 5    ibuprofen (MOTRIN) 600 MG Tab Take 1 tablet by mouth every 8 hours as needed for Moderate Pain. 60 Tablet 5    nicotine polacrilex (NICORETTE) 2 MG Gum Chew 1 PIECE BY MOUTH IF NEEDED FOR SMOKING CESSATION (Patient taking differently: Chew 1 PIECE BY MOUTH IF NEEDED FOR SMOKING CESSATION) 50 Each 3     No current facility-administered medications for this visit.     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    She has no past medical history of Clotting disorder ([facility]), COPD, or Cushings syndrome ([facility]).         Objective:     Vitals:    10/19/22 1445   BP: 122/82   Pulse: 72   Resp: 16   Temp: 36.2 °C (97.1 °F)   SpO2: 95%     Body mass index is 28.82 kg/m².   Physical Exam:  Constitutional: Alert, no distress.  Skin: Warm, dry, good turgor, no rashes in visible areas.  Eye: Equal, round and reactive, conjunctiva clear, lids normal.  Psych: Alert and oriented x3, normal affect and mood.  MSK: Range of motion of shoulder limited to about 90 degrees abduction and 120 degrees forward flexion    Assessment and Plan:   The following treatment plan was discussed    1. Mixed incontinence urge and stress  At this point, symptoms are mild.  We discussed trial of low-dose oxybutynin which she prefers to avoid.  We discussed avoiding caffeine and cutting back on her fluids for at least a few hours before bedtime.  Also discussed Kegel exercises and she was given a handout for this today.    2. Need for vaccination  - INFLUENZA VACCINE QUAD INJ (PF)    3. Chronic bilateral low back pain,  unspecified whether sciatica present  I think she would benefit from starting to do physical therapy for her back.  She is planning to obtain the x-rays that I had requested at her last visit in early November.  I have sent a message to her therapist asking for incorporation of low back exercises.  I have refilled her Tylenol.  -Continue ibuprofen and gabapentin as needed  -Start working with PT for low back  -Follow-up with lumbar x-ray as ordered, could consider pain management referral if needed  - acetaminophen (TYLENOL) 500 MG Tab; Take 1-2 Tablets by mouth every 6 hours as needed for Moderate Pain.  Dispense: 60 Tablet; Refill: 5    4. Chronic right shoulder pain  Slowly improving.  We discussed that she needs to continue consistent work with physical therapy.        Followup: Return in about 3 months (around 1/19/2023).

## 2022-10-19 NOTE — ASSESSMENT & PLAN NOTE
She reports that her low back continues to be painful on a daily basis.  Today she was helping her friend clean out a storage unit and she was bending and lifting for about 3 hours and now she is having significant discomfort.  She reports that the muscles feel tight.  She continues to take ibuprofen and Tylenol with mild relief.  Physical therapy has not started working with her on her back yet.

## 2022-10-19 NOTE — ASSESSMENT & PLAN NOTE
She continues to work with physical therapy.  She had 2 appointments in June and she has had 2 appointments in September.  She is noticing improvement in range of motion especially with forward flexion of her shoulder but she feels like her progress has been slow.

## 2022-10-19 NOTE — ASSESSMENT & PLAN NOTE
She states that she is having occasional episodes of urinary incontinence.  She noticed that today when she was bending over, she was leaking several drops of urine.  It was not enough to need to change her pants.  She did not feel like her bladder was full at the time.  Also states that if she wakes up in the middle the night and has to urinate, if she does not get to the bathroom immediately she has an accident.  This is less common during the day but occasionally will happen.  She reports minimal urinary leakage with coughing, sneezing, bearing down.  She denies dysuria, pelvic pain.  At this time, she desires to avoid medication.

## 2022-10-21 ENCOUNTER — APPOINTMENT (OUTPATIENT)
Dept: PHYSICAL THERAPY | Facility: REHABILITATION | Age: 62
End: 2022-10-21
Attending: NURSE PRACTITIONER
Payer: MEDICAID

## 2022-11-21 PROCEDURE — RXMED WILLOW AMBULATORY MEDICATION CHARGE: Performed by: INTERNAL MEDICINE

## 2022-11-23 ENCOUNTER — PHARMACY VISIT (OUTPATIENT)
Dept: PHARMACY | Facility: MEDICAL CENTER | Age: 62
End: 2022-11-23
Payer: COMMERCIAL

## 2022-12-22 PROCEDURE — RXMED WILLOW AMBULATORY MEDICATION CHARGE: Performed by: INTERNAL MEDICINE

## 2022-12-30 ENCOUNTER — PHARMACY VISIT (OUTPATIENT)
Dept: PHARMACY | Facility: MEDICAL CENTER | Age: 62
End: 2022-12-30
Payer: COMMERCIAL

## 2023-01-19 ENCOUNTER — OFFICE VISIT (OUTPATIENT)
Dept: MEDICAL GROUP | Facility: MEDICAL CENTER | Age: 63
End: 2023-01-19
Attending: INTERNAL MEDICINE
Payer: MEDICAID

## 2023-01-19 VITALS
OXYGEN SATURATION: 97 % | HEIGHT: 64 IN | WEIGHT: 169 LBS | DIASTOLIC BLOOD PRESSURE: 80 MMHG | RESPIRATION RATE: 16 BRPM | TEMPERATURE: 98.1 F | BODY MASS INDEX: 28.85 KG/M2 | HEART RATE: 76 BPM | SYSTOLIC BLOOD PRESSURE: 128 MMHG

## 2023-01-19 DIAGNOSIS — G89.29 CHRONIC RIGHT SHOULDER PAIN: ICD-10-CM

## 2023-01-19 DIAGNOSIS — G89.29 CHRONIC BILATERAL LOW BACK PAIN, UNSPECIFIED WHETHER SCIATICA PRESENT: ICD-10-CM

## 2023-01-19 DIAGNOSIS — K08.89 PAIN, DENTAL: ICD-10-CM

## 2023-01-19 DIAGNOSIS — M54.50 CHRONIC BILATERAL LOW BACK PAIN, UNSPECIFIED WHETHER SCIATICA PRESENT: ICD-10-CM

## 2023-01-19 DIAGNOSIS — M25.511 CHRONIC RIGHT SHOULDER PAIN: ICD-10-CM

## 2023-01-19 DIAGNOSIS — Z12.11 SCREEN FOR COLON CANCER: ICD-10-CM

## 2023-01-19 DIAGNOSIS — Z23 NEED FOR VACCINATION: ICD-10-CM

## 2023-01-19 PROBLEM — H93.13 TINNITUS OF BOTH EARS: Status: RESOLVED | Noted: 2020-08-28 | Resolved: 2023-01-19

## 2023-01-19 PROCEDURE — 99214 OFFICE O/P EST MOD 30 MIN: CPT | Mod: 25 | Performed by: INTERNAL MEDICINE

## 2023-01-19 PROCEDURE — 90677 PCV20 VACCINE IM: CPT | Performed by: INTERNAL MEDICINE

## 2023-01-19 PROCEDURE — 306637 HCHG MISC ORTHO ITEM RC 0274: Performed by: INTERNAL MEDICINE

## 2023-01-19 PROCEDURE — 99213 OFFICE O/P EST LOW 20 MIN: CPT | Mod: 25 | Performed by: INTERNAL MEDICINE

## 2023-01-19 PROCEDURE — 90471 IMMUNIZATION ADMIN: CPT | Performed by: INTERNAL MEDICINE

## 2023-01-19 PROCEDURE — RXMED WILLOW AMBULATORY MEDICATION CHARGE: Performed by: INTERNAL MEDICINE

## 2023-01-19 RX ORDER — AMOXICILLIN 500 MG/1
500 CAPSULE ORAL 3 TIMES DAILY
Qty: 21 CAPSULE | Refills: 0 | Status: SHIPPED | OUTPATIENT
Start: 2023-01-19 | End: 2023-02-01

## 2023-01-19 RX ORDER — GABAPENTIN 400 MG/1
400 CAPSULE ORAL 3 TIMES DAILY
Qty: 90 CAPSULE | Refills: 5 | Status: SHIPPED | OUTPATIENT
Start: 2023-01-19 | End: 2023-10-02 | Stop reason: SDUPTHER

## 2023-01-19 ASSESSMENT — PATIENT HEALTH QUESTIONNAIRE - PHQ9: CLINICAL INTERPRETATION OF PHQ2 SCORE: 0

## 2023-01-19 NOTE — ASSESSMENT & PLAN NOTE
Reports that she went to see her dentist at [facility] on 12/7/2022.  At that time we determined she needed to have all of her teeth extracted and they made some impressions to recreate dentures for her.  She was supposed to follow-up for the extractions but pushed her visit out and now she will not be seen until February 27 with plan for surgery in March.  She reports that lately she has had more pain with eating over the left upper teeth.  She noticed on Sunday when she woke up in the morning that her face was very swollen and painful and she even had some swelling beneath her eye.  She feels like there is a decayed tooth which is getting impacted with food although she is trying to get the food out to the best of her ability.  She is concerned she has an infection.  She is having pain with eating.

## 2023-01-20 NOTE — ASSESSMENT & PLAN NOTE
Reports using some of her friends topical lidocaine and really getting good relief.  She is interested in her own prescription.  She continues on gabapentin.  She has not gotten imaging.

## 2023-01-20 NOTE — PROGRESS NOTES
Subjective:   Yola Campuzano is a 62 y.o. female here today for dental pain, shoulder and back pain    Pain, dental  Reports that she went to see her dentist at [facility] on 12/7/2022.  At that time we determined she needed to have all of her teeth extracted and they made some impressions to recreate dentures for her.  She was supposed to follow-up for the extractions but pushed her visit out and now she will not be seen until February 27 with plan for surgery in March.  She reports that lately she has had more pain with eating over the left upper teeth.  She noticed on Sunday when she woke up in the morning that her face was very swollen and painful and she even had some swelling beneath her eye.  She feels like there is a decayed tooth which is getting impacted with food although she is trying to get the food out to the best of her ability.  She is concerned she has an infection.  She is having pain with eating.    Chronic right shoulder pain  She continues to have right shoulder pain but she was very inconsistent with physical therapy and continued to require repeat authorizations.  She has decided at this point that she would just rather do some exercises at home as she is noticing gradual improvement.  Overall this does not affect her activities of daily living.    Low back pain  Reports using some of her friends topical lidocaine and really getting good relief.  She is interested in her own prescription.  She continues on gabapentin.  She has not gotten imaging.       Current medicines (including changes today)  Current Outpatient Medications   Medication Sig Dispense Refill    amoxicillin (AMOXIL) 500 MG Cap Take 1 Capsule by mouth 3 times a day for 7 days. 21 Capsule 0    Lidocaine 4 % Ointment Apply thin layer to painful areas of back up to twice daily as needed for pain 50 g 2    acetaminophen (TYLENOL) 500 MG Tab Take 1-2 Tablets by mouth every 6 hours as needed for Moderate Pain. 60 Tablet 5     calcium carbonate (OS-GLENN 500) 500 MG Tab Take 1 Tablet by mouth 2 times a day with meals. 60 Tablet 5    ergocalciferol (DRISDOL) 02067 UNIT capsule Take 1 Capsule by mouth every 14 days. 4 Capsule 5    gabapentin (NEURONTIN) 400 MG Cap Take 1 capsule by mouth 3 times a day. 90 Capsule 5    ibuprofen (MOTRIN) 600 MG Tab Take 1 tablet by mouth every 8 hours as needed for Moderate Pain. 60 Tablet 5    nicotine polacrilex (NICORETTE) 2 MG Gum Chew 1 PIECE BY MOUTH IF NEEDED FOR SMOKING CESSATION (Patient taking differently: Chew 1 PIECE BY MOUTH IF NEEDED FOR SMOKING CESSATION) 50 Each 3     No current facility-administered medications for this visit.     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), Tobacco dependence (8/14/2012), Tuberculosis, Urinary tract infection, site not specified, and Vitamin d deficiency (9/25/2012).    She has no past medical history of Clotting disorder ([facility]), COPD, or Cushings syndrome ([facility]).         Objective:     Vitals:    01/19/23 1403   BP: 128/80   Pulse: 76   Resp: 16   Temp: 36.7 °C (98.1 °F)   SpO2: 97%     Body mass index is 28.99 kg/m².   Physical Exam:  Constitutional: Alert, no distress.  Skin: Warm, dry, good turgor, no rashes in visible areas.  Eye: Equal, round and reactive, conjunctiva clear, lids normal.  ENMT: Possible mild facial swelling over the left maxillary region, very poor dentition with numerous carious teeth   Psych: Alert and oriented x3, normal affect and mood.      Assessment and Plan:   The following treatment plan was discussed    1. Pain, dental  As she awaits her full extraction, discussed treatment with amoxicillin today based on her pain and facial swelling.  - amoxicillin (AMOXIL) 500 MG Cap; Take 1 Capsule by mouth 3 times a day for 7  days.  Dispense: 21 Capsule; Refill: 0    2. Screen for colon cancer  - OCCULT BLOOD FECES IMMUNOASSAY (FIT); Future    3. Need for vaccination  - Pneumococcal Conjugate Vaccine 20-Valent (19 yrs+)    4. Chronic bilateral low back pain, unspecified whether sciatica present  With good response to topical lidocaine.  Prescription given today and we discussed that if it is not covered by insurance she can purchase it over-the-counter.  - Lidocaine 4 % Ointment; Apply thin layer to painful areas of back up to twice daily as needed for pain  Dispense: 50 g; Refill: 2    5. Chronic right shoulder pain  Still with limited range of motion but she is happy working on home exercises and with her slow but steady progress.  She declines further referral to physical therapy or intervention at this time.        Followup: Return if symptoms worsen or fail to improve, for PAP.

## 2023-01-20 NOTE — ASSESSMENT & PLAN NOTE
She continues to have right shoulder pain but she was very inconsistent with physical therapy and continued to require repeat authorizations.  She has decided at this point that she would just rather do some exercises at home as she is noticing gradual improvement.  Overall this does not affect her activities of daily living.

## 2023-01-25 ENCOUNTER — PHARMACY VISIT (OUTPATIENT)
Dept: PHARMACY | Facility: MEDICAL CENTER | Age: 63
End: 2023-01-25
Payer: COMMERCIAL

## 2023-02-23 ENCOUNTER — PHARMACY VISIT (OUTPATIENT)
Dept: PHARMACY | Facility: MEDICAL CENTER | Age: 63
End: 2023-02-23
Payer: COMMERCIAL

## 2023-02-23 PROCEDURE — RXMED WILLOW AMBULATORY MEDICATION CHARGE: Performed by: INTERNAL MEDICINE

## 2023-03-22 PROCEDURE — RXMED WILLOW AMBULATORY MEDICATION CHARGE: Performed by: INTERNAL MEDICINE

## 2023-03-23 ENCOUNTER — TELEMEDICINE (OUTPATIENT)
Dept: MEDICAL GROUP | Facility: MEDICAL CENTER | Age: 63
End: 2023-03-23
Attending: INTERNAL MEDICINE
Payer: MEDICAID

## 2023-03-23 ENCOUNTER — PHARMACY VISIT (OUTPATIENT)
Dept: PHARMACY | Facility: MEDICAL CENTER | Age: 63
End: 2023-03-23
Payer: COMMERCIAL

## 2023-03-23 VITALS — RESPIRATION RATE: 16 BRPM | HEIGHT: 64 IN | WEIGHT: 169 LBS | BODY MASS INDEX: 28.85 KG/M2

## 2023-03-23 DIAGNOSIS — J06.9 VIRAL URI: ICD-10-CM

## 2023-03-23 DIAGNOSIS — B30.9 VIRAL CONJUNCTIVITIS: ICD-10-CM

## 2023-03-23 PROCEDURE — 99213 OFFICE O/P EST LOW 20 MIN: CPT | Performed by: INTERNAL MEDICINE

## 2023-03-23 NOTE — ASSESSMENT & PLAN NOTE
Reports symptoms began about 3 weeks ago with nasal congestion and progressed to sore throat.  She is now having sore throat with mild cough mostly in the mornings and symptoms usually dissipate by midday.  She has been taking an over-the-counter cough and cold medication for daytime and nighttime with improvement.  She denies shortness of breath, fevers, chills.  Has been experiencing some headaches but thinks this is more related to her teeth and poor dentition, expecting to have a full extraction on 4/20.  Denies purulent nasal discharge or ear pain.

## 2023-03-23 NOTE — ASSESSMENT & PLAN NOTE
Reports about 3 days ago she started to have redness and drainage from her right eye.  Worst in the mornings with some crusting.  Denies pain or changes in vision.  Has been sick with a cold for about the past 3 weeks.

## 2023-03-23 NOTE — PROGRESS NOTES
Virtual Visit: Established Patient   This visit was conducted via Zoom using secure and encrypted videoconferencing technology.   The patient was in their home in the [facility].    The patient's identity was confirmed and verbal consent was obtained for this virtual visit.     Subjective:   CC: pink eye, sick x 3 weeks    Yola Campuzano is a 63 y.o. female presenting for evaluation and management of:    Viral conjunctivitis  Reports about 3 days ago she started to have redness and drainage from her right eye.  Worst in the mornings with some crusting.  Denies pain or changes in vision.  Has been sick with a cold for about the past 3 weeks.    Viral URI  Reports symptoms began about 3 weeks ago with nasal congestion and progressed to sore throat.  She is now having sore throat with mild cough mostly in the mornings and symptoms usually dissipate by midday.  She has been taking an over-the-counter cough and cold medication for daytime and nighttime with improvement.  She denies shortness of breath, fevers, chills.  Has been experiencing some headaches but thinks this is more related to her teeth and poor dentition, expecting to have a full extraction on 4/20.  Denies purulent nasal discharge or ear pain.       Current medicines (including changes today)  Current Outpatient Medications   Medication Sig Dispense Refill    Lidocaine 4 % Ointment Apply thin layer to painful areas of back up to twice daily as needed for pain 50 g 2    gabapentin (NEURONTIN) 400 MG Cap Take 1 capsule by mouth 3 times a day. 90 Capsule 5    acetaminophen (TYLENOL) 500 MG Tab Take 1-2 Tablets by mouth every 6 hours as needed for Moderate Pain. 60 Tablet 5    calcium carbonate (OS-GLENN 500) 500 MG Tab Take 1 Tablet by mouth 2 times a day with meals. 60 Tablet 5    ergocalciferol (DRISDOL) 28362 UNIT capsule Take 1 Capsule by mouth every 14 days. 4 Capsule 5    ibuprofen (MOTRIN) 600 MG Tab Take 1 tablet by mouth every 8 hours as  "needed for Moderate Pain. 60 Tablet 5    nicotine polacrilex (NICORETTE) 2 MG Gum Chew 1 PIECE BY MOUTH IF NEEDED FOR SMOKING CESSATION (Patient taking differently: Chew 1 PIECE BY MOUTH IF NEEDED FOR SMOKING CESSATION) 50 Each 3     No current facility-administered medications for this visit.       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  Skin: No rashes in visible areas.  Eye: Round. Conjunctiva clear, no obvious drainage or swelling of lids. No icterus.   ENMT: Lips pink without lesions, good dentition, moist mucous membranes. Phonation normal.  Neck: No masses, no thyromegaly. Moves freely without pain.  Respiratory: Unlabored respiratory effort, no cough or audible wheeze  Psych: Alert and oriented x3, normal affect and mood.     Assessment and Plan:   The following treatment plan was discussed:     1. Viral conjunctivitis  Symptoms are most consistent with a viral conjunctivitis especially in the setting of a recent URI.  Discussed conservative management.  May use a warm compress on the eye and lubricating drops as needed but will hold off on any bacterial drops at this time.  Discussed that if she starts to develop increasing " redness, pain in the eye, or more purulent drainage that she should follow-up with me immediately.  Discussed that if she experiences vision loss associated with this she needs to go to the ER.    2. Viral URI  Again, symptoms most consistent with viral infection.  Do not think she has pneumonia or a sinusitis that would benefit from antibiotics at this time.  We discussed continued supportive therapy with her over-the-counter cough and cold medications, fluids, rest.  If she is not improving over the next week she will reach out to let me know.    Follow-up: Return if symptoms worsen or fail to improve.

## 2023-04-25 DIAGNOSIS — G89.29 CHRONIC BILATERAL LOW BACK PAIN, UNSPECIFIED WHETHER SCIATICA PRESENT: ICD-10-CM

## 2023-04-25 DIAGNOSIS — M54.50 CHRONIC BILATERAL LOW BACK PAIN, UNSPECIFIED WHETHER SCIATICA PRESENT: ICD-10-CM

## 2023-04-25 PROCEDURE — RXMED WILLOW AMBULATORY MEDICATION CHARGE: Performed by: INTERNAL MEDICINE

## 2023-04-26 PROCEDURE — RXMED WILLOW AMBULATORY MEDICATION CHARGE: Performed by: INTERNAL MEDICINE

## 2023-04-26 RX ORDER — IBUPROFEN 600 MG/1
600 TABLET ORAL EVERY 8 HOURS PRN
Qty: 60 TABLET | Refills: 5 | Status: SHIPPED | OUTPATIENT
Start: 2023-04-26 | End: 2024-01-25 | Stop reason: SDUPTHER

## 2023-04-26 NOTE — TELEPHONE ENCOUNTER
Received request via: Pharmacy    Was the patient seen in the last year in this department? Yes    Does the patient have an active prescription (recently filled or refills available) for medication(s) requested? No    Does the patient have intermediate Plus and need 100 day supply (blood pressure, diabetes and cholesterol meds only)? Patient does not have SCP   No appt scheduled at this time

## 2023-05-04 ENCOUNTER — PHARMACY VISIT (OUTPATIENT)
Dept: PHARMACY | Facility: MEDICAL CENTER | Age: 63
End: 2023-05-04
Payer: COMMERCIAL

## 2023-06-15 PROCEDURE — RXMED WILLOW AMBULATORY MEDICATION CHARGE: Performed by: INTERNAL MEDICINE

## 2023-06-28 ENCOUNTER — PHARMACY VISIT (OUTPATIENT)
Dept: PHARMACY | Facility: MEDICAL CENTER | Age: 63
End: 2023-06-28
Payer: COMMERCIAL

## 2023-08-10 PROCEDURE — RXMED WILLOW AMBULATORY MEDICATION CHARGE: Performed by: INTERNAL MEDICINE

## 2023-08-18 ENCOUNTER — PHARMACY VISIT (OUTPATIENT)
Dept: PHARMACY | Facility: MEDICAL CENTER | Age: 63
End: 2023-08-18
Payer: COMMERCIAL

## 2023-10-02 DIAGNOSIS — G89.29 CHRONIC BILATERAL LOW BACK PAIN, UNSPECIFIED WHETHER SCIATICA PRESENT: ICD-10-CM

## 2023-10-02 DIAGNOSIS — M54.50 CHRONIC BILATERAL LOW BACK PAIN, UNSPECIFIED WHETHER SCIATICA PRESENT: ICD-10-CM

## 2023-10-02 PROCEDURE — RXMED WILLOW AMBULATORY MEDICATION CHARGE: Performed by: INTERNAL MEDICINE

## 2023-10-03 PROCEDURE — RXMED WILLOW AMBULATORY MEDICATION CHARGE: Performed by: INTERNAL MEDICINE

## 2023-10-03 RX ORDER — GABAPENTIN 400 MG/1
400 CAPSULE ORAL 3 TIMES DAILY
Qty: 90 CAPSULE | Refills: 5 | Status: SHIPPED | OUTPATIENT
Start: 2023-10-03

## 2023-10-06 ENCOUNTER — PHARMACY VISIT (OUTPATIENT)
Dept: PHARMACY | Facility: MEDICAL CENTER | Age: 63
End: 2023-10-06
Payer: COMMERCIAL

## 2023-11-17 PROCEDURE — RXMED WILLOW AMBULATORY MEDICATION CHARGE: Performed by: INTERNAL MEDICINE

## 2023-11-21 ENCOUNTER — PHARMACY VISIT (OUTPATIENT)
Dept: PHARMACY | Facility: MEDICAL CENTER | Age: 63
End: 2023-11-21
Payer: COMMERCIAL

## 2023-12-18 DIAGNOSIS — F17.200 TOBACCO DEPENDENCE: ICD-10-CM

## 2023-12-18 PROCEDURE — RXMED WILLOW AMBULATORY MEDICATION CHARGE: Performed by: INTERNAL MEDICINE

## 2023-12-20 ENCOUNTER — PHARMACY VISIT (OUTPATIENT)
Dept: PHARMACY | Facility: MEDICAL CENTER | Age: 63
End: 2023-12-20
Payer: COMMERCIAL

## 2024-01-25 DIAGNOSIS — M54.50 CHRONIC BILATERAL LOW BACK PAIN, UNSPECIFIED WHETHER SCIATICA PRESENT: ICD-10-CM

## 2024-01-25 DIAGNOSIS — G89.29 CHRONIC BILATERAL LOW BACK PAIN, UNSPECIFIED WHETHER SCIATICA PRESENT: ICD-10-CM

## 2024-01-25 RX ORDER — IBUPROFEN 600 MG/1
600 TABLET ORAL EVERY 8 HOURS PRN
Qty: 60 TABLET | Refills: 2 | Status: SHIPPED | OUTPATIENT
Start: 2024-01-25

## 2024-01-25 NOTE — TELEPHONE ENCOUNTER
Received request via: Pharmacy    Was the patient seen in the last year in this department? Yes    Does the patient have an active prescription (recently filled or refills available) for medication(s) requested? No    Pharmacy Name: [facility]    Does the patient have [facility] Plus and need 100 day supply (blood pressure, diabetes and cholesterol meds only)? Patient does not have SCP

## 2024-01-31 NOTE — TELEPHONE ENCOUNTER
Received request via: Pharmacy    Was the patient seen in the last year in this department? Yes      Does the patient have an active prescription (recently filled or refills available) for medication(s) requested? No  
Home

## 2024-02-01 ENCOUNTER — PHARMACY VISIT (OUTPATIENT)
Dept: PHARMACY | Facility: MEDICAL CENTER | Age: 64
End: 2024-02-01
Payer: COMMERCIAL

## 2024-02-01 PROCEDURE — RXMED WILLOW AMBULATORY MEDICATION CHARGE: Performed by: INTERNAL MEDICINE

## 2024-02-09 ENCOUNTER — PHARMACY VISIT (OUTPATIENT)
Dept: PHARMACY | Facility: MEDICAL CENTER | Age: 64
End: 2024-02-09
Payer: COMMERCIAL

## 2024-02-09 PROCEDURE — RXMED WILLOW AMBULATORY MEDICATION CHARGE: Performed by: NURSE PRACTITIONER

## 2024-02-09 RX ORDER — ERYTHROMYCIN 5 MG/G
OINTMENT OPHTHALMIC
Qty: 30 G | Refills: 0 | Status: SHIPPED | OUTPATIENT
Start: 2024-02-08 | End: 2024-03-28

## 2024-03-15 PROCEDURE — RXMED WILLOW AMBULATORY MEDICATION CHARGE: Performed by: INTERNAL MEDICINE

## 2024-03-18 ENCOUNTER — PHARMACY VISIT (OUTPATIENT)
Dept: PHARMACY | Facility: MEDICAL CENTER | Age: 64
End: 2024-03-18
Payer: COMMERCIAL

## 2024-03-27 SDOH — ECONOMIC STABILITY: INCOME INSECURITY: HOW HARD IS IT FOR YOU TO PAY FOR THE VERY BASICS LIKE FOOD, HOUSING, MEDICAL CARE, AND HEATING?: SOMEWHAT HARD

## 2024-03-27 SDOH — ECONOMIC STABILITY: INCOME INSECURITY: IN THE LAST 12 MONTHS, WAS THERE A TIME WHEN YOU WERE NOT ABLE TO PAY THE MORTGAGE OR RENT ON TIME?: YES

## 2024-03-27 SDOH — ECONOMIC STABILITY: TRANSPORTATION INSECURITY
IN THE PAST 12 MONTHS, HAS THE LACK OF TRANSPORTATION KEPT YOU FROM MEDICAL APPOINTMENTS OR FROM GETTING MEDICATIONS?: NO

## 2024-03-27 SDOH — ECONOMIC STABILITY: HOUSING INSECURITY: IN THE LAST 12 MONTHS, HOW MANY PLACES HAVE YOU LIVED?: 1

## 2024-03-27 SDOH — ECONOMIC STABILITY: TRANSPORTATION INSECURITY
IN THE PAST 12 MONTHS, HAS LACK OF RELIABLE TRANSPORTATION KEPT YOU FROM MEDICAL APPOINTMENTS, MEETINGS, WORK OR FROM GETTING THINGS NEEDED FOR DAILY LIVING?: YES

## 2024-03-27 SDOH — HEALTH STABILITY: PHYSICAL HEALTH: ON AVERAGE, HOW MANY DAYS PER WEEK DO YOU ENGAGE IN MODERATE TO STRENUOUS EXERCISE (LIKE A BRISK WALK)?: 0 DAYS

## 2024-03-27 SDOH — HEALTH STABILITY: PHYSICAL HEALTH: ON AVERAGE, HOW MANY MINUTES DO YOU ENGAGE IN EXERCISE AT THIS LEVEL?: 30 MIN

## 2024-03-27 SDOH — ECONOMIC STABILITY: FOOD INSECURITY: WITHIN THE PAST 12 MONTHS, THE FOOD YOU BOUGHT JUST DIDN'T LAST AND YOU DIDN'T HAVE MONEY TO GET MORE.: SOMETIMES TRUE

## 2024-03-27 SDOH — ECONOMIC STABILITY: HOUSING INSECURITY
IN THE LAST 12 MONTHS, WAS THERE A TIME WHEN YOU DID NOT HAVE A STEADY PLACE TO SLEEP OR SLEPT IN A SHELTER (INCLUDING NOW)?: NO

## 2024-03-27 SDOH — ECONOMIC STABILITY: HOUSING INSECURITY
IN THE LAST 12 MONTHS, WAS THERE A TIME WHEN YOU DID NOT HAVE A STEADY PLACE TO SLEEP OR SLEPT IN A SHELTER (INCLUDING NOW)?: YES

## 2024-03-27 SDOH — ECONOMIC STABILITY: FOOD INSECURITY: WITHIN THE PAST 12 MONTHS, YOU WORRIED THAT YOUR FOOD WOULD RUN OUT BEFORE YOU GOT MONEY TO BUY MORE.: SOMETIMES TRUE

## 2024-03-27 SDOH — HEALTH STABILITY: MENTAL HEALTH
STRESS IS WHEN SOMEONE FEELS TENSE, NERVOUS, ANXIOUS, OR CAN'T SLEEP AT NIGHT BECAUSE THEIR MIND IS TROUBLED. HOW STRESSED ARE YOU?: ONLY A LITTLE

## 2024-03-27 SDOH — ECONOMIC STABILITY: TRANSPORTATION INSECURITY
IN THE PAST 12 MONTHS, HAS LACK OF TRANSPORTATION KEPT YOU FROM MEETINGS, WORK, OR FROM GETTING THINGS NEEDED FOR DAILY LIVING?: YES

## 2024-03-27 ASSESSMENT — LIFESTYLE VARIABLES
HOW MANY STANDARD DRINKS CONTAINING ALCOHOL DO YOU HAVE ON A TYPICAL DAY: 5 OR 6
HOW OFTEN DO YOU HAVE A DRINK CONTAINING ALCOHOL: 2-4 TIMES A MONTH
SKIP TO QUESTIONS 9-10: 0
AUDIT-C TOTAL SCORE: 5
HOW OFTEN DO YOU HAVE SIX OR MORE DRINKS ON ONE OCCASION: LESS THAN MONTHLY

## 2024-03-27 ASSESSMENT — SOCIAL DETERMINANTS OF HEALTH (SDOH)
HOW OFTEN DO YOU GET TOGETHER WITH FRIENDS OR RELATIVES?: ONCE A WEEK
WITHIN THE PAST 12 MONTHS, YOU WORRIED THAT YOUR FOOD WOULD RUN OUT BEFORE YOU GOT THE MONEY TO BUY MORE: SOMETIMES TRUE
DO YOU BELONG TO ANY CLUBS OR ORGANIZATIONS SUCH AS CHURCH GROUPS UNIONS, FRATERNAL OR ATHLETIC GROUPS, OR SCHOOL GROUPS?: NO
HOW OFTEN DO YOU ATTEND CHURCH OR RELIGIOUS SERVICES?: NEVER
HOW OFTEN DO YOU HAVE SIX OR MORE DRINKS ON ONE OCCASION: LESS THAN MONTHLY
DO YOU BELONG TO ANY CLUBS OR ORGANIZATIONS SUCH AS CHURCH GROUPS UNIONS, FRATERNAL OR ATHLETIC GROUPS, OR SCHOOL GROUPS?: NO
HOW HARD IS IT FOR YOU TO PAY FOR THE VERY BASICS LIKE FOOD, HOUSING, MEDICAL CARE, AND HEATING?: SOMEWHAT HARD
HOW OFTEN DO YOU HAVE A DRINK CONTAINING ALCOHOL: 2-4 TIMES A MONTH
IN A TYPICAL WEEK, HOW MANY TIMES DO YOU TALK ON THE PHONE WITH FAMILY, FRIENDS, OR NEIGHBORS?: ONCE A WEEK
HOW MANY DRINKS CONTAINING ALCOHOL DO YOU HAVE ON A TYPICAL DAY WHEN YOU ARE DRINKING: 5 OR 6
HOW OFTEN DO YOU GET TOGETHER WITH FRIENDS OR RELATIVES?: ONCE A WEEK
IN A TYPICAL WEEK, HOW MANY TIMES DO YOU TALK ON THE PHONE WITH FAMILY, FRIENDS, OR NEIGHBORS?: ONCE A WEEK
HOW OFTEN DO YOU ATTEND CHURCH OR RELIGIOUS SERVICES?: NEVER

## 2024-03-28 ENCOUNTER — OFFICE VISIT (OUTPATIENT)
Dept: MEDICAL GROUP | Facility: MEDICAL CENTER | Age: 64
End: 2024-03-28
Attending: INTERNAL MEDICINE
Payer: MEDICAID

## 2024-03-28 VITALS
WEIGHT: 142 LBS | HEART RATE: 66 BPM | HEIGHT: 64 IN | BODY MASS INDEX: 24.24 KG/M2 | TEMPERATURE: 97.1 F | SYSTOLIC BLOOD PRESSURE: 138 MMHG | RESPIRATION RATE: 16 BRPM | OXYGEN SATURATION: 97 % | DIASTOLIC BLOOD PRESSURE: 82 MMHG

## 2024-03-28 DIAGNOSIS — M85.80 OSTEOPENIA, UNSPECIFIED LOCATION: ICD-10-CM

## 2024-03-28 DIAGNOSIS — H61.23 BILATERAL IMPACTED CERUMEN: ICD-10-CM

## 2024-03-28 DIAGNOSIS — Z12.31 SCREENING MAMMOGRAM, ENCOUNTER FOR: ICD-10-CM

## 2024-03-28 DIAGNOSIS — M72.0 DUPUYTREN'S CONTRACTURE OF HAND: ICD-10-CM

## 2024-03-28 DIAGNOSIS — G89.29 CHRONIC BILATERAL LOW BACK PAIN, UNSPECIFIED WHETHER SCIATICA PRESENT: ICD-10-CM

## 2024-03-28 DIAGNOSIS — Z12.11 SCREEN FOR COLON CANCER: ICD-10-CM

## 2024-03-28 DIAGNOSIS — M25.551 RIGHT HIP PAIN: ICD-10-CM

## 2024-03-28 DIAGNOSIS — R74.8 ELEVATED ALKALINE PHOSPHATASE LEVEL: ICD-10-CM

## 2024-03-28 DIAGNOSIS — E78.5 DYSLIPIDEMIA: ICD-10-CM

## 2024-03-28 DIAGNOSIS — B35.1 ONYCHOMYCOSIS: ICD-10-CM

## 2024-03-28 DIAGNOSIS — M54.50 CHRONIC BILATERAL LOW BACK PAIN, UNSPECIFIED WHETHER SCIATICA PRESENT: ICD-10-CM

## 2024-03-28 DIAGNOSIS — Z13.1 SCREENING FOR DIABETES MELLITUS: ICD-10-CM

## 2024-03-28 PROBLEM — J06.9 VIRAL URI: Status: RESOLVED | Noted: 2023-03-23 | Resolved: 2024-03-28

## 2024-03-28 PROBLEM — B30.9 VIRAL CONJUNCTIVITIS: Status: RESOLVED | Noted: 2023-03-23 | Resolved: 2024-03-28

## 2024-03-28 PROBLEM — K08.89 PAIN, DENTAL: Status: RESOLVED | Noted: 2020-02-12 | Resolved: 2024-03-28

## 2024-03-28 PROCEDURE — 99214 OFFICE O/P EST MOD 30 MIN: CPT | Performed by: INTERNAL MEDICINE

## 2024-03-28 PROCEDURE — 3075F SYST BP GE 130 - 139MM HG: CPT | Performed by: INTERNAL MEDICINE

## 2024-03-28 PROCEDURE — 3079F DIAST BP 80-89 MM HG: CPT | Performed by: INTERNAL MEDICINE

## 2024-03-28 PROCEDURE — 69209 REMOVE IMPACTED EAR WAX UNI: CPT | Performed by: INTERNAL MEDICINE

## 2024-03-28 PROCEDURE — 99213 OFFICE O/P EST LOW 20 MIN: CPT | Performed by: INTERNAL MEDICINE

## 2024-03-28 PROCEDURE — RXMED WILLOW AMBULATORY MEDICATION CHARGE: Performed by: INTERNAL MEDICINE

## 2024-03-28 RX ORDER — ERGOCALCIFEROL 1.25 MG/1
50000 CAPSULE ORAL
Qty: 4 CAPSULE | Refills: 5 | Status: SHIPPED | OUTPATIENT
Start: 2024-03-28

## 2024-03-28 RX ORDER — TERBINAFINE HYDROCHLORIDE 250 MG/1
250 TABLET ORAL DAILY
Qty: 30 TABLET | Refills: 3 | Status: SHIPPED | OUTPATIENT
Start: 2024-03-28

## 2024-03-28 NOTE — PROGRESS NOTES
Provider ordered bi lateral ear lavage. Pt was familiar with the process. Patient was placed in a sitting position and provided towels. Two bottles prepared with minimal effect. Pt was advised that they could return in a few day as a MA walk in. Pt stated no complaints

## 2024-03-28 NOTE — PROGRESS NOTES
Subjective:   Yola Campuzano is a 64 y.o. female here today for hip pain, chronic issues below    Right hip pain  Reports chronic progressively worsening right-sided hip pain.  She has had difficulty flexing her right hip for over a year.  About 2 weeks ago she was walking and she had a super sharp stabbing pain over the lateral aspect of the hip that caused her to stop.  Since then she has had significant continued pain she describes as a dull throbbing that radiates to her knee into her lower leg.  She states it has been harder to sleep at night.  She has been taking ibuprofen 600 mg every day and sometimes 2 tablets without much improvement.  She does not think her gabapentin has been helpful.  She also smokes marijuana and not sure if this is helping with the pain.  Has a cane that she has been needing to use for a few days related to this.  Has not had any falls and denies trauma to the hip.  Had imaging of her hips in 2019 which showed mild osteoarthritis.      Bilateral impacted cerumen  Complains of ringing in both ears.  Has had history of cerumen impaction in the past.  Does not currently feel like it is affecting her hearing.  Has required ear lavage before.    Dupuytren's contracture of hand  Has had over the past 10 years, progressively worsening.  Starting to affect her ability to use her hand.  She is interested in seeing a specialist to discuss treatment options.    Onychomycosis  For several years has noticed thickening, yellowing of toenails on both feet.  Has never used any topical or oral therapy.       Current medicines (including changes today)  Current Outpatient Medications   Medication Sig Dispense Refill    ergocalciferol (DRISDOL) 23358 UNIT capsule Take 1 Capsule by mouth every 14 days. 4 Capsule 5    terbinafine (LAMISIL) 250 MG Tab Take 1 Tablet by mouth every day. 30 Tablet 3    ibuprofen (MOTRIN) 600 MG Tab Take 1 tablet by mouth every 8 hours as needed for Moderate Pain. 60  Tablet 2    nicotine polacrilex (NICORETTE) 2 MG Gum Chew 1 PIECE BY MOUTH IF NEEDED FOR SMOKING CESSATION 50 Each 3    gabapentin (NEURONTIN) 400 MG Cap Take 1 capsule by mouth 3 times a day. 90 Capsule 5    acetaminophen (TYLENOL) 500 MG Tab Take 1-2 Tablets by mouth every 6 hours as needed for Moderate Pain. 60 Tablet 5    calcium carbonate (OS-GLENN 500) 500 MG Tab Take 1 Tablet by mouth 2 times a day with meals. 60 Tablet 5     No current facility-administered medications for this visit.     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    She has no past medical history of Clotting disorder ([facility]), COPD, or Cushings syndrome ([facility]).         Objective:     Vitals:    03/28/24 1118   BP: 138/82   Pulse: 66   Resp: 16   Temp: 36.2 °C (97.1 °F)   SpO2: 97%     Body mass index is 24.36 kg/m².   Physical Exam:  Constitutional: Alert, no distress.  Skin: Warm, dry, good turgor, no rashes in visible areas, thickened oncotic nails b/l.  Eye: Equal, round and reactive, conjunctiva clear, lids normal.  ENMT: Lips without lesions, edentulous with dentures oropharynx clear, auditory canals impacted b/l with wax  Neck: Trachea midline, no masses, no thyromegaly. No cervical or supraclavicular lymphadenopathy  Respiratory: Unlabored respiratory effort, lungs clear to auscultation, no wheezes, no ronchi.  Cardiovascular: Regular rate and rhythm, no murmurs appreciated, no lower extremity edema  Abdomen: Soft, non-tender, no masses, no hepatosplenomegaly.  Psych: Alert and oriented x3, normal affect and mood.  MSK: no tenderness to palpation over lateral aspect of the hip or greater  trochanter, patient unable to flex the right hip without using accessory muscles, positive MODESTA testing on right  Extrem: R hand with Dupuytren contracture    Assessment and Plan:   The following treatment plan was discussed    1. Screening mammogram, encounter for  - MA-SCREENING MAMMO BILAT W/TOMOSYNTHESIS W/CAD; Future    2. Screen for colon cancer  - OCCULT BLOOD,FECAL,IMMUNOASSAY    3. Right hip pain  Suspect she has some progression of her underlying hip OA and possibly some gluteal tendinitis.  Discussed referral to physical therapy as well as updated x-rays.  If no improvement upon completion of physical therapy, will order MRI and consider Ortho referral  - DX-HIP-BILATERAL-WITH PELVIS-2 VIEWS; Future  - Referral to Physical Therapy    4. Chronic bilateral low back pain, unspecified whether sciatica present  With known degenerative disc disease, likely has progressed.  Again we will obtain updated imaging and refer to physical therapy.  - DX-LUMBAR SPINE-2 OR 3 VIEWS; Future  - Referral to Physical Therapy    5. Osteopenia, unspecified location  - VITAMIN D,25 HYDROXY (DEFICIENCY); Future  - ergocalciferol (DRISDOL) 08907 UNIT capsule; Take 1 Capsule by mouth every 14 days.  Dispense: 4 Capsule; Refill: 5    6. Dyslipidemia  - Lipid Profile; Future    7. Screening for diabetes mellitus  - HEMOGLOBIN A1C; Future    8. Elevated alkaline phosphatase level  - Comp Metabolic Panel; Future    9. Dupuytren's contracture of hand  Moderate to severe, discussed she would likely need surgical intervention.  Referral placed to orthopedics.  - Referral to Orthopedics    10. Bilateral impacted cerumen  Ear lavage completed by medical assistant in clinic today, was partially successful.  Patient instructed to use OTC Debrox and return to clinic next week if still symptomatic    11. Onychomycosis  We will treat with terbinafine as below  - terbinafine (LAMISIL) 250 MG Tab; Take 1 Tablet by mouth every day.  Dispense: 30  Tablet; Refill: 3        Followup: Return in about 3 months (around 6/28/2024).

## 2024-03-28 NOTE — ASSESSMENT & PLAN NOTE
Reports chronic progressively worsening right-sided hip pain.  She has had difficulty flexing her right hip for over a year.  About 2 weeks ago she was walking and she had a super sharp stabbing pain over the lateral aspect of the hip that caused her to stop.  Since then she has had significant continued pain she describes as a dull throbbing that radiates to her knee into her lower leg.  She states it has been harder to sleep at night.  She has been taking ibuprofen 600 mg every day and sometimes 2 tablets without much improvement.  She does not think her gabapentin has been helpful.  She also smokes marijuana and not sure if this is helping with the pain.  Has a cane that she has been needing to use for a few days related to this.  Has not had any falls and denies trauma to the hip.  Had imaging of her hips in 2019 which showed mild osteoarthritis.

## 2024-03-28 NOTE — ASSESSMENT & PLAN NOTE
Complains of ringing in both ears.  Has had history of cerumen impaction in the past.  Does not currently feel like it is affecting her hearing.  Has required ear lavage before.

## 2024-03-28 NOTE — ASSESSMENT & PLAN NOTE
Has had over the past 10 years, progressively worsening.  Starting to affect her ability to use her hand.  She is interested in seeing a specialist to discuss treatment options.

## 2024-03-28 NOTE — ASSESSMENT & PLAN NOTE
For several years has noticed thickening, yellowing of toenails on both feet.  Has never used any topical or oral therapy.

## 2024-04-03 ENCOUNTER — PHARMACY VISIT (OUTPATIENT)
Dept: PHARMACY | Facility: MEDICAL CENTER | Age: 64
End: 2024-04-03
Payer: COMMERCIAL

## 2024-04-03 ENCOUNTER — NON-PROVIDER VISIT (OUTPATIENT)
Dept: MEDICAL GROUP | Facility: MEDICAL CENTER | Age: 64
End: 2024-04-03
Attending: INTERNAL MEDICINE
Payer: MEDICAID

## 2024-04-03 PROCEDURE — 69209 REMOVE IMPACTED EAR WAX UNI: CPT | Performed by: INTERNAL MEDICINE

## 2024-04-03 NOTE — PROGRESS NOTES
Yola Campuzano is a 64 y.o. female here for a non-provider visit for bi lat ear lavage     If abnormal was an in office provider notified today (if so, indicate provider)? Yes    Routed to PCP? Yes    Pt return to clinic as a ma walking for Bi lat era lavage, Pt was placed in a sitting position, 2 bottle prepared using hydrogen peroxide and warm water. Temp tested on back of patients ear. Pt ok with temp. Pt tolerated ear lavage with minimal disorientation. Left ear was successfully, tympanic membrane visible. Right ear was unsuccessful. Pt was advised to  otc debrox from the pharmacy and to return for ma walkin after using debrox. Pt was monitored until no longer felling disorientated. Pt stated she would return in approx 7 days .   No further

## 2024-04-09 ENCOUNTER — HOSPITAL ENCOUNTER (OUTPATIENT)
Dept: RADIOLOGY | Facility: MEDICAL CENTER | Age: 64
End: 2024-04-09
Attending: INTERNAL MEDICINE
Payer: MEDICAID

## 2024-04-09 DIAGNOSIS — Z12.31 SCREENING MAMMOGRAM, ENCOUNTER FOR: ICD-10-CM

## 2024-04-09 PROCEDURE — 77067 SCR MAMMO BI INCL CAD: CPT

## 2024-04-15 ENCOUNTER — HOSPITAL ENCOUNTER (OUTPATIENT)
Dept: RADIOLOGY | Facility: MEDICAL CENTER | Age: 64
End: 2024-04-15
Attending: INTERNAL MEDICINE
Payer: MEDICAID

## 2024-04-15 DIAGNOSIS — M54.50 CHRONIC BILATERAL LOW BACK PAIN, UNSPECIFIED WHETHER SCIATICA PRESENT: ICD-10-CM

## 2024-04-15 DIAGNOSIS — G89.29 CHRONIC BILATERAL LOW BACK PAIN, UNSPECIFIED WHETHER SCIATICA PRESENT: ICD-10-CM

## 2024-04-15 DIAGNOSIS — M25.551 RIGHT HIP PAIN: ICD-10-CM

## 2024-04-15 PROCEDURE — 72100 X-RAY EXAM L-S SPINE 2/3 VWS: CPT

## 2024-04-15 PROCEDURE — 73521 X-RAY EXAM HIPS BI 2 VIEWS: CPT

## 2024-04-16 DIAGNOSIS — M87.051 AVASCULAR NECROSIS OF BONE OF HIP, RIGHT (HCC): ICD-10-CM

## 2024-04-16 DIAGNOSIS — M25.551 RIGHT HIP PAIN: ICD-10-CM

## 2024-04-16 DIAGNOSIS — M16.0 PRIMARY OSTEOARTHRITIS OF BOTH HIPS: ICD-10-CM

## 2024-05-01 PROBLEM — M16.11 PRIMARY OSTEOARTHRITIS OF RIGHT HIP: Status: ACTIVE | Noted: 2024-05-01

## 2024-05-01 PROCEDURE — RXMED WILLOW AMBULATORY MEDICATION CHARGE: Performed by: ORTHOPAEDIC SURGERY

## 2024-05-09 ENCOUNTER — OFFICE VISIT (OUTPATIENT)
Dept: MEDICAL GROUP | Facility: MEDICAL CENTER | Age: 64
End: 2024-05-09
Attending: INTERNAL MEDICINE
Payer: MEDICAID

## 2024-05-09 ENCOUNTER — PHARMACY VISIT (OUTPATIENT)
Dept: PHARMACY | Facility: MEDICAL CENTER | Age: 64
End: 2024-05-09
Payer: COMMERCIAL

## 2024-05-09 VITALS
OXYGEN SATURATION: 96 % | HEART RATE: 90 BPM | RESPIRATION RATE: 16 BRPM | WEIGHT: 141 LBS | SYSTOLIC BLOOD PRESSURE: 128 MMHG | TEMPERATURE: 97.1 F | BODY MASS INDEX: 24.07 KG/M2 | HEIGHT: 64 IN | DIASTOLIC BLOOD PRESSURE: 88 MMHG

## 2024-05-09 DIAGNOSIS — M16.11 PRIMARY OSTEOARTHRITIS OF RIGHT HIP: ICD-10-CM

## 2024-05-09 DIAGNOSIS — R01.1 HEART MURMUR: ICD-10-CM

## 2024-05-09 DIAGNOSIS — R06.02 SHORTNESS OF BREATH: ICD-10-CM

## 2024-05-09 PROCEDURE — 3079F DIAST BP 80-89 MM HG: CPT | Performed by: INTERNAL MEDICINE

## 2024-05-09 PROCEDURE — 99214 OFFICE O/P EST MOD 30 MIN: CPT | Performed by: INTERNAL MEDICINE

## 2024-05-09 PROCEDURE — RXMED WILLOW AMBULATORY MEDICATION CHARGE: Performed by: INTERNAL MEDICINE

## 2024-05-09 PROCEDURE — 3074F SYST BP LT 130 MM HG: CPT | Performed by: INTERNAL MEDICINE

## 2024-05-09 NOTE — ASSESSMENT & PLAN NOTE
She reports that within the past month she has noticed an increase in her shortness of breath specifically when she is going upstairs.  States that she was carrying groceries upstairs on 1 occasion and felt so short of breath that she thought she was going to pass out.  She had to stop.  She did not experience syncope.  Additionally, she has been having some dizziness especially when she bends over to stretch.  She states that when she is walking on flat ground she is not necessarily getting any shortness of breath however she has had a persistent cough for over a month after a viral URI.  She does not think the shortness of breath coincided with the onset of the cough.  She states yesterday was the first day that she did not experience the cough.  She denies any chest pain or palpitations associated with the shortness of breath.  When she was talking to her surgeon about getting a hip replacement done, he had referred her to our office because of these new complaints for further workup prior to surgery.  She continues to smoke.

## 2024-05-10 NOTE — PROGRESS NOTES
Subjective:   Yola Campuzano is a 64 y.o. female here today for new onset shortness of breath, pre op eval    Shortness of breath  She reports that within the past month she has noticed an increase in her shortness of breath specifically when she is going upstairs.  States that she was carrying groceries upstairs on 1 occasion and felt so short of breath that she thought she was going to pass out.  She had to stop.  She did not experience syncope.  Additionally, she has been having some dizziness especially when she bends over to stretch.  She states that when she is walking on flat ground she is not necessarily getting any shortness of breath however she has had a persistent cough for over a month after a viral URI.  She does not think the shortness of breath coincided with the onset of the cough.  She states yesterday was the first day that she did not experience the cough.  She denies any chest pain or palpitations associated with the shortness of breath.  When she was talking to her surgeon about getting a hip replacement done, he had referred her to our office because of these new complaints for further workup prior to surgery.  She continues to smoke.  Denies leg swelling.    Heart murmur  She has a heart murmur that is fairly pronounced on exam today.  She does not recall being told about this in the past.  We have an echocardiogram from 2012 which showed aortic valve sclerosis without stenosis, trace TR trace pulmonic insufficiency but was otherwise normal.  She reports reduced exercise tolerance and shortness of breath however she is not very physically active at baseline.  She denies syncope or presyncope.    Primary osteoarthritis of right hip  She has severe osteoarthritis in her right hip and she has already seen orthopedics.  They are recommending she have hip replacement surgery in the near future.       Current medicines (including changes today)  Current Outpatient Medications   Medication Sig  Dispense Refill    meloxicam (MOBIC) 15 MG tablet Take 1 Tablet by mouth every day. 30 Tablet 2    ergocalciferol (DRISDOL) 47679 UNIT capsule Take 1 Capsule by mouth every 14 days. 4 Capsule 5    terbinafine (LAMISIL) 250 MG Tab Take 1 Tablet by mouth every day. 30 Tablet 3    ibuprofen (MOTRIN) 600 MG Tab Take 1 tablet by mouth every 8 hours as needed for Moderate Pain. 60 Tablet 2    nicotine polacrilex (NICORETTE) 2 MG Gum Chew 1 PIECE BY MOUTH IF NEEDED FOR SMOKING CESSATION 50 Each 3    gabapentin (NEURONTIN) 400 MG Cap Take 1 capsule by mouth 3 times a day. 90 Capsule 5    acetaminophen (TYLENOL) 500 MG Tab Take 1-2 Tablets by mouth every 6 hours as needed for Moderate Pain. 60 Tablet 5    calcium carbonate (OS-GLENN 500) 500 MG Tab Take 1 Tablet by mouth 2 times a day with meals. 60 Tablet 5     No current facility-administered medications for this visit.     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    She has no past medical history of Clotting disorder ([facility]), COPD, or Cushings syndrome ([facility]).         Objective:     Vitals:    05/09/24 1541   BP: 128/88   Pulse: 90   Resp: 16   Temp: 36.2 °C (97.1 °F)   SpO2: 96%     Body mass index is 24.19 kg/m².   Physical Exam:  Constitutional: Alert, no distress.  Skin: Warm, dry, good turgor, no rashes in visible areas.  Eye: Equal, round and reactive, conjunctiva clear, lids normal.  Respiratory: mild cough, unlabored respiratory effort, lungs clear to auscultation, no wheezes, no ronchi.  Cardiovascular: Regular rate and rhythm, murmur present heard best over right upper sternal border, no lower extremity edema  Psych:  Alert and oriented x3, normal affect and mood.    Assessment and Plan:   The following treatment plan was discussed    1. Shortness of breath  Unclear etiology.  Could certainly represent some progression of underlying lung disease related to her smoking.  Her saturation is normal at rest and she is not having symptoms unless she is exerting herself significantly.  We discussed obtaining a chest x-ray given her persistent cough.  There is no evidence of DVT/PE on exam.  We could consider getting pulmonary function testing in the future but I do not think this would change her operative management especially with her normal saturation at rest and when walking on flat surface.  - DX-CHEST-2 VIEWS; Future    2. Heart murmur  Given her exertional shortness of breath, some dizziness with position changes, and significant murmur on exam we discussed obtaining an echocardiogram just to make sure she does not have any severe stenosis or regurgitation that would need to be fixed preoperatively.  - EC-ECHOCARDIOGRAM COMPLETE W/O CONT; Future    3. Primary osteoarthritis of right hip  Currently, we will wait to have her hip replacement surgery done until she gets her echocardiogram and chest x-ray.  If everything looks fine then will reach out to [facility] to provide surgical clearance.  She does have some PT scheduled, discussed she can postpone this until postop.        Followup: Return if symptoms worsen or fail to improve.

## 2024-05-10 NOTE — ASSESSMENT & PLAN NOTE
She has severe osteoarthritis in her right hip and she has already seen orthopedics.  They are recommending she have hip replacement surgery in the near future.

## 2024-05-10 NOTE — ASSESSMENT & PLAN NOTE
She has a heart murmur that is fairly pronounced on exam today.  She does not recall being told about this in the past.  We have an echocardiogram from 2012 which showed aortic valve sclerosis without stenosis, trace TR trace pulmonic insufficiency but was otherwise normal.  She reports reduced exercise tolerance and shortness of breath however she is not very physically active at baseline.  She denies syncope or presyncope.

## 2024-06-14 ENCOUNTER — APPOINTMENT (OUTPATIENT)
Dept: PHYSICAL THERAPY | Facility: REHABILITATION | Age: 64
End: 2024-06-14
Attending: INTERNAL MEDICINE
Payer: MEDICAID

## 2024-07-09 ENCOUNTER — APPOINTMENT (OUTPATIENT)
Dept: PHYSICAL THERAPY | Facility: REHABILITATION | Age: 64
End: 2024-07-09
Attending: INTERNAL MEDICINE
Payer: MEDICAID

## 2024-07-16 ENCOUNTER — APPOINTMENT (OUTPATIENT)
Dept: PHYSICAL THERAPY | Facility: REHABILITATION | Age: 64
End: 2024-07-16
Attending: INTERNAL MEDICINE
Payer: MEDICAID

## 2024-07-23 ENCOUNTER — APPOINTMENT (OUTPATIENT)
Dept: PHYSICAL THERAPY | Facility: REHABILITATION | Age: 64
End: 2024-07-23
Attending: INTERNAL MEDICINE
Payer: MEDICAID

## 2024-07-30 ENCOUNTER — APPOINTMENT (OUTPATIENT)
Dept: PHYSICAL THERAPY | Facility: REHABILITATION | Age: 64
End: 2024-07-30
Attending: INTERNAL MEDICINE
Payer: MEDICAID

## 2024-08-02 ENCOUNTER — APPOINTMENT (OUTPATIENT)
Dept: PHYSICAL THERAPY | Facility: REHABILITATION | Age: 64
End: 2024-08-02
Attending: INTERNAL MEDICINE
Payer: MEDICAID

## 2024-08-05 ENCOUNTER — PATIENT MESSAGE (OUTPATIENT)
Dept: HEALTH INFORMATION MANAGEMENT | Facility: OTHER | Age: 64
End: 2024-08-05

## 2024-08-05 DIAGNOSIS — M54.50 CHRONIC BILATERAL LOW BACK PAIN, UNSPECIFIED WHETHER SCIATICA PRESENT: ICD-10-CM

## 2024-08-05 DIAGNOSIS — G89.29 CHRONIC BILATERAL LOW BACK PAIN, UNSPECIFIED WHETHER SCIATICA PRESENT: ICD-10-CM

## 2024-08-05 PROCEDURE — RXMED WILLOW AMBULATORY MEDICATION CHARGE: Performed by: INTERNAL MEDICINE

## 2024-08-05 NOTE — TELEPHONE ENCOUNTER
Received request via: Pharmacy    Was the patient seen in the last year in this department? Yes    Does the patient have an active prescription (recently filled or refills available) for medication(s) requested? No    [facility]    Does the patient have [facility] Plus and need 100 day supply (blood pressure, diabetes and cholesterol meds only)? Patient does not have SCP

## 2024-08-06 ENCOUNTER — APPOINTMENT (OUTPATIENT)
Dept: PHYSICAL THERAPY | Facility: REHABILITATION | Age: 64
End: 2024-08-06
Attending: INTERNAL MEDICINE
Payer: MEDICAID

## 2024-08-06 PROCEDURE — RXMED WILLOW AMBULATORY MEDICATION CHARGE: Performed by: INTERNAL MEDICINE

## 2024-08-06 RX ORDER — IBUPROFEN 600 MG/1
600 TABLET, FILM COATED ORAL EVERY 8 HOURS PRN
Qty: 60 TABLET | Refills: 2 | Status: SHIPPED | OUTPATIENT
Start: 2024-08-06

## 2024-08-07 ENCOUNTER — PHARMACY VISIT (OUTPATIENT)
Dept: PHARMACY | Facility: MEDICAL CENTER | Age: 64
End: 2024-08-07
Payer: COMMERCIAL

## 2024-08-07 ENCOUNTER — HOSPITAL ENCOUNTER (OUTPATIENT)
Dept: RADIOLOGY | Facility: MEDICAL CENTER | Age: 64
End: 2024-08-07
Attending: INTERNAL MEDICINE
Payer: MEDICAID

## 2024-08-07 ENCOUNTER — HOSPITAL ENCOUNTER (OUTPATIENT)
Dept: CARDIOLOGY | Facility: MEDICAL CENTER | Age: 64
End: 2024-08-07
Attending: INTERNAL MEDICINE
Payer: MEDICAID

## 2024-08-07 DIAGNOSIS — R06.02 SHORTNESS OF BREATH: ICD-10-CM

## 2024-08-07 DIAGNOSIS — R01.1 HEART MURMUR: ICD-10-CM

## 2024-08-07 LAB
LV EJECT FRACT MOD 2C 99903: 64.2
LV EJECT FRACT MOD 4C 99902: 59.16
LV EJECT FRACT MOD BP 99901: 61.97

## 2024-08-07 PROCEDURE — 93306 TTE W/DOPPLER COMPLETE: CPT | Mod: 26 | Performed by: INTERNAL MEDICINE

## 2024-08-07 PROCEDURE — 71046 X-RAY EXAM CHEST 2 VIEWS: CPT

## 2024-08-07 PROCEDURE — 93306 TTE W/DOPPLER COMPLETE: CPT

## 2024-08-09 ENCOUNTER — APPOINTMENT (OUTPATIENT)
Dept: PHYSICAL THERAPY | Facility: REHABILITATION | Age: 64
End: 2024-08-09
Attending: INTERNAL MEDICINE
Payer: MEDICAID

## 2024-08-13 ENCOUNTER — APPOINTMENT (OUTPATIENT)
Dept: PHYSICAL THERAPY | Facility: REHABILITATION | Age: 64
End: 2024-08-13
Attending: INTERNAL MEDICINE
Payer: MEDICAID

## 2024-08-13 ENCOUNTER — PATIENT MESSAGE (OUTPATIENT)
Dept: MEDICAL GROUP | Facility: MEDICAL CENTER | Age: 64
End: 2024-08-13
Payer: MEDICAID

## 2024-08-14 ENCOUNTER — APPOINTMENT (OUTPATIENT)
Dept: ADMISSIONS | Facility: MEDICAL CENTER | Age: 64
End: 2024-08-14
Attending: ORTHOPAEDIC SURGERY
Payer: MEDICAID

## 2024-08-15 NOTE — PROGRESS NOTES
Please contact the [facility] and request that they fax their preop medical clearance form for me to complete.    Vannesa Bernard M.D.

## 2024-08-21 ENCOUNTER — PRE-ADMISSION TESTING (OUTPATIENT)
Dept: ADMISSIONS | Facility: MEDICAL CENTER | Age: 64
End: 2024-08-21
Attending: ORTHOPAEDIC SURGERY
Payer: MEDICAID

## 2024-08-21 DIAGNOSIS — Z01.818 PREOP EXAMINATION: ICD-10-CM

## 2024-08-21 DIAGNOSIS — Z01.810 PRE-OPERATIVE CARDIOVASCULAR EXAMINATION: ICD-10-CM

## 2024-08-21 DIAGNOSIS — Z01.812 PRE-OPERATIVE LABORATORY EXAMINATION: ICD-10-CM

## 2024-08-21 LAB
ANION GAP SERPL CALC-SCNC: 12 MMOL/L (ref 7–16)
BUN SERPL-MCNC: 14 MG/DL (ref 8–22)
CALCIUM SERPL-MCNC: 9.4 MG/DL (ref 8.4–10.2)
CHLORIDE SERPL-SCNC: 106 MMOL/L (ref 96–112)
CO2 SERPL-SCNC: 27 MMOL/L (ref 20–33)
CREAT SERPL-MCNC: 0.67 MG/DL (ref 0.5–1.4)
EKG IMPRESSION: NORMAL
ERYTHROCYTE [DISTWIDTH] IN BLOOD BY AUTOMATED COUNT: 41.3 FL (ref 35.9–50)
GFR SERPLBLD CREATININE-BSD FMLA CKD-EPI: 97 ML/MIN/1.73 M 2
GLUCOSE SERPL-MCNC: 90 MG/DL (ref 65–99)
HCT VFR BLD AUTO: 40.4 % (ref 37–47)
HGB BLD-MCNC: 14.5 G/DL (ref 12–16)
MCH RBC QN AUTO: 33.2 PG (ref 27–33)
MCHC RBC AUTO-ENTMCNC: 35.9 G/DL (ref 32.2–35.5)
MCV RBC AUTO: 92.4 FL (ref 81.4–97.8)
PLATELET # BLD AUTO: 272 K/UL (ref 164–446)
PMV BLD AUTO: 9.4 FL (ref 9–12.9)
POTASSIUM SERPL-SCNC: 4 MMOL/L (ref 3.6–5.5)
RBC # BLD AUTO: 4.37 M/UL (ref 4.2–5.4)
SCCMEC + MECA PNL NOSE NAA+PROBE: NEGATIVE
SCCMEC + MECA PNL NOSE NAA+PROBE: NEGATIVE
SODIUM SERPL-SCNC: 145 MMOL/L (ref 135–145)
WBC # BLD AUTO: 8.8 K/UL (ref 4.8–10.8)

## 2024-08-21 PROCEDURE — 93010 ELECTROCARDIOGRAM REPORT: CPT | Performed by: INTERNAL MEDICINE

## 2024-08-21 PROCEDURE — 87641 MR-STAPH DNA AMP PROBE: CPT

## 2024-08-21 PROCEDURE — 85027 COMPLETE CBC AUTOMATED: CPT

## 2024-08-21 PROCEDURE — 36415 COLL VENOUS BLD VENIPUNCTURE: CPT

## 2024-08-21 PROCEDURE — 87640 STAPH A DNA AMP PROBE: CPT

## 2024-08-21 PROCEDURE — 80048 BASIC METABOLIC PNL TOTAL CA: CPT

## 2024-08-21 PROCEDURE — 93005 ELECTROCARDIOGRAM TRACING: CPT

## 2024-08-21 NOTE — DISCHARGE PLANNING
DISCHARGE PLANNING NOTE - TOTAL JOINT    Procedure: Procedure(s):  RIGHT ANTERIOR TOTAL HIP ARTHROPLASTY  Procedure Date: 9/12/2024  Insurance: Payor: [place] [employer] / Plan: [place] [employer] / Product Type: Medicaid /    Equipment currently available at home?   Will need fww  Steps into the home? 15  Steps within the home? 0  Toilet height? Standard, might get a toilet seat riser  Type of shower? tub-shower  Home Oxygen? No  Portable tank?    Oxygen Provider:  Who will be with you during your recovery? Daughter can pick her up but not stay with her.  Is Outpatient Physical Therapy set up after surgery? No   Did you take the Total Joint Class and where? Yes, received NAON book.  Planning same day discharge?No lives alone and is requesting transitional care facility post op.    This writer met with pt and educated to preop showers, nasal mrsa swab which was obtained by this writer, and potential for overnight stay. CHG kit given to pt. Home safety checklist sent home with pt. Pt educated to dc criteria. All questions answered and verbalizes understanding of all instructions. No dc needs identified at this time. Anticipate dc to home without barriers.

## 2024-08-21 NOTE — PREPROCEDURE INSTRUCTIONS
PreAdmit Telephone Appointment: Reviewed the Preparing for your procedure handout with patient over the phone. Patient instructed per pharmacy guidelines regarding taking, holding or contacting provider for instructions on regularly prescribed medications before surgery.     Confirmed with patient where to check in morning of surgery. Handouts/Brochure/Video emailed to patient.

## 2024-09-12 ENCOUNTER — HOSPITAL ENCOUNTER (OUTPATIENT)
Facility: MEDICAL CENTER | Age: 64
End: 2024-09-16
Attending: ORTHOPAEDIC SURGERY | Admitting: ORTHOPAEDIC SURGERY
Payer: MEDICAID

## 2024-09-12 ENCOUNTER — ANESTHESIA EVENT (OUTPATIENT)
Dept: SURGERY | Facility: MEDICAL CENTER | Age: 64
End: 2024-09-12
Payer: MEDICAID

## 2024-09-12 ENCOUNTER — APPOINTMENT (OUTPATIENT)
Dept: RADIOLOGY | Facility: MEDICAL CENTER | Age: 64
End: 2024-09-12
Attending: ORTHOPAEDIC SURGERY
Payer: MEDICAID

## 2024-09-12 ENCOUNTER — ANESTHESIA (OUTPATIENT)
Dept: SURGERY | Facility: MEDICAL CENTER | Age: 64
End: 2024-09-12
Payer: MEDICAID

## 2024-09-12 DIAGNOSIS — M16.11 PRIMARY OSTEOARTHRITIS OF RIGHT HIP: ICD-10-CM

## 2024-09-12 PROCEDURE — 73501 X-RAY EXAM HIP UNI 1 VIEW: CPT | Mod: RT

## 2024-09-12 PROCEDURE — 160009 HCHG ANES TIME/MIN: Performed by: ORTHOPAEDIC SURGERY

## 2024-09-12 PROCEDURE — 27130 TOTAL HIP ARTHROPLASTY: CPT | Mod: ASROC,RT

## 2024-09-12 PROCEDURE — 502000 HCHG MISC OR IMPLANTS RC 0278: Performed by: ORTHOPAEDIC SURGERY

## 2024-09-12 PROCEDURE — 160048 HCHG OR STATISTICAL LEVEL 1-5: Performed by: ORTHOPAEDIC SURGERY

## 2024-09-12 PROCEDURE — 160036 HCHG PACU - EA ADDL 30 MINS PHASE I: Performed by: ORTHOPAEDIC SURGERY

## 2024-09-12 PROCEDURE — 700102 HCHG RX REV CODE 250 W/ 637 OVERRIDE(OP): Performed by: ANESTHESIOLOGY

## 2024-09-12 PROCEDURE — 160031 HCHG SURGERY MINUTES - 1ST 30 MINS LEVEL 5: Performed by: ORTHOPAEDIC SURGERY

## 2024-09-12 PROCEDURE — C1713 ANCHOR/SCREW BN/BN,TIS/BN: HCPCS | Performed by: ORTHOPAEDIC SURGERY

## 2024-09-12 PROCEDURE — 700105 HCHG RX REV CODE 258: Performed by: ORTHOPAEDIC SURGERY

## 2024-09-12 PROCEDURE — 96365 THER/PROPH/DIAG IV INF INIT: CPT

## 2024-09-12 PROCEDURE — 700111 HCHG RX REV CODE 636 W/ 250 OVERRIDE (IP): Mod: JZ | Performed by: ANESTHESIOLOGY

## 2024-09-12 PROCEDURE — 160035 HCHG PACU - 1ST 60 MINS PHASE I: Performed by: ORTHOPAEDIC SURGERY

## 2024-09-12 PROCEDURE — 700111 HCHG RX REV CODE 636 W/ 250 OVERRIDE (IP): Performed by: ANESTHESIOLOGY

## 2024-09-12 PROCEDURE — 160002 HCHG RECOVERY MINUTES (STAT): Performed by: ORTHOPAEDIC SURGERY

## 2024-09-12 PROCEDURE — 700111 HCHG RX REV CODE 636 W/ 250 OVERRIDE (IP): Performed by: ORTHOPAEDIC SURGERY

## 2024-09-12 PROCEDURE — 700101 HCHG RX REV CODE 250: Performed by: ORTHOPAEDIC SURGERY

## 2024-09-12 PROCEDURE — 27130 TOTAL HIP ARTHROPLASTY: CPT | Mod: RT | Performed by: ORTHOPAEDIC SURGERY

## 2024-09-12 PROCEDURE — 160042 HCHG SURGERY MINUTES - EA ADDL 1 MIN LEVEL 5: Performed by: ORTHOPAEDIC SURGERY

## 2024-09-12 PROCEDURE — A9270 NON-COVERED ITEM OR SERVICE: HCPCS | Performed by: ORTHOPAEDIC SURGERY

## 2024-09-12 PROCEDURE — C1776 JOINT DEVICE (IMPLANTABLE): HCPCS | Performed by: ORTHOPAEDIC SURGERY

## 2024-09-12 PROCEDURE — G0378 HOSPITAL OBSERVATION PER HR: HCPCS

## 2024-09-12 PROCEDURE — 700101 HCHG RX REV CODE 250: Performed by: ANESTHESIOLOGY

## 2024-09-12 PROCEDURE — A9270 NON-COVERED ITEM OR SERVICE: HCPCS | Performed by: ANESTHESIOLOGY

## 2024-09-12 PROCEDURE — 94760 N-INVAS EAR/PLS OXIMETRY 1: CPT

## 2024-09-12 PROCEDURE — 700102 HCHG RX REV CODE 250 W/ 637 OVERRIDE(OP): Performed by: ORTHOPAEDIC SURGERY

## 2024-09-12 DEVICE — SCREW BONE 6.5 X 20MM TRIDENT LP HEX (1EA): Type: IMPLANTABLE DEVICE | Site: HIP | Status: FUNCTIONAL

## 2024-09-12 DEVICE — IMPLANTABLE DEVICE: Type: IMPLANTABLE DEVICE | Site: HIP | Status: FUNCTIONAL

## 2024-09-12 RX ORDER — ACETAMINOPHEN 325 MG/1
650 TABLET ORAL EVERY 6 HOURS PRN
Status: DISCONTINUED | OUTPATIENT
Start: 2024-09-17 | End: 2024-09-16 | Stop reason: HOSPADM

## 2024-09-12 RX ORDER — BUPIVACAINE HYDROCHLORIDE 2.5 MG/ML
INJECTION, SOLUTION EPIDURAL; INFILTRATION; INTRACAUDAL
Status: DISCONTINUED | OUTPATIENT
Start: 2024-09-12 | End: 2024-09-12 | Stop reason: HOSPADM

## 2024-09-12 RX ORDER — HYDROMORPHONE HYDROCHLORIDE 1 MG/ML
0.4 INJECTION, SOLUTION INTRAMUSCULAR; INTRAVENOUS; SUBCUTANEOUS
Status: DISCONTINUED | OUTPATIENT
Start: 2024-09-12 | End: 2024-09-12 | Stop reason: HOSPADM

## 2024-09-12 RX ORDER — LEVALBUTEROL INHALATION SOLUTION 0.63 MG/3ML
0.63 SOLUTION RESPIRATORY (INHALATION)
Status: DISCONTINUED | OUTPATIENT
Start: 2024-09-12 | End: 2024-09-12 | Stop reason: HOSPADM

## 2024-09-12 RX ORDER — LIDOCAINE HYDROCHLORIDE 20 MG/ML
INJECTION, SOLUTION EPIDURAL; INFILTRATION; INTRACAUDAL; PERINEURAL PRN
Status: DISCONTINUED | OUTPATIENT
Start: 2024-09-12 | End: 2024-09-12 | Stop reason: SURG

## 2024-09-12 RX ORDER — AMOXICILLIN 250 MG
1 CAPSULE ORAL
Status: DISCONTINUED | OUTPATIENT
Start: 2024-09-12 | End: 2024-09-16 | Stop reason: HOSPADM

## 2024-09-12 RX ORDER — DIPHENHYDRAMINE HYDROCHLORIDE 50 MG/ML
25 INJECTION INTRAMUSCULAR; INTRAVENOUS EVERY 6 HOURS PRN
Status: DISCONTINUED | OUTPATIENT
Start: 2024-09-12 | End: 2024-09-16 | Stop reason: HOSPADM

## 2024-09-12 RX ORDER — BISACODYL 10 MG
10 SUPPOSITORY, RECTAL RECTAL
Status: DISCONTINUED | OUTPATIENT
Start: 2024-09-12 | End: 2024-09-16 | Stop reason: HOSPADM

## 2024-09-12 RX ORDER — ASPIRIN 81 MG/1
81 TABLET ORAL 2 TIMES DAILY
Status: DISCONTINUED | OUTPATIENT
Start: 2024-09-13 | End: 2024-09-16 | Stop reason: HOSPADM

## 2024-09-12 RX ORDER — ACETAMINOPHEN 500 MG
1000 TABLET ORAL ONCE
Status: COMPLETED | OUTPATIENT
Start: 2024-09-12 | End: 2024-09-12

## 2024-09-12 RX ORDER — HYDROMORPHONE HYDROCHLORIDE 1 MG/ML
0.2 INJECTION, SOLUTION INTRAMUSCULAR; INTRAVENOUS; SUBCUTANEOUS
Status: DISCONTINUED | OUTPATIENT
Start: 2024-09-12 | End: 2024-09-12 | Stop reason: HOSPADM

## 2024-09-12 RX ORDER — MAGNESIUM SULFATE HEPTAHYDRATE 40 MG/ML
INJECTION, SOLUTION INTRAVENOUS PRN
Status: DISCONTINUED | OUTPATIENT
Start: 2024-09-12 | End: 2024-09-12 | Stop reason: SURG

## 2024-09-12 RX ORDER — HALOPERIDOL 5 MG/ML
1 INJECTION INTRAMUSCULAR EVERY 6 HOURS PRN
Status: DISCONTINUED | OUTPATIENT
Start: 2024-09-12 | End: 2024-09-16 | Stop reason: HOSPADM

## 2024-09-12 RX ORDER — GLYCOPYRROLATE 0.2 MG/ML
INJECTION INTRAMUSCULAR; INTRAVENOUS PRN
Status: DISCONTINUED | OUTPATIENT
Start: 2024-09-12 | End: 2024-09-12 | Stop reason: SURG

## 2024-09-12 RX ORDER — HYDROMORPHONE HYDROCHLORIDE 1 MG/ML
0.5 INJECTION, SOLUTION INTRAMUSCULAR; INTRAVENOUS; SUBCUTANEOUS
Status: DISCONTINUED | OUTPATIENT
Start: 2024-09-12 | End: 2024-09-16 | Stop reason: HOSPADM

## 2024-09-12 RX ORDER — ROCURONIUM BROMIDE 10 MG/ML
INJECTION, SOLUTION INTRAVENOUS PRN
Status: DISCONTINUED | OUTPATIENT
Start: 2024-09-12 | End: 2024-09-12 | Stop reason: SURG

## 2024-09-12 RX ORDER — SCOLOPAMINE TRANSDERMAL SYSTEM 1 MG/1
1 PATCH, EXTENDED RELEASE TRANSDERMAL
Status: DISCONTINUED | OUTPATIENT
Start: 2024-09-12 | End: 2024-09-16 | Stop reason: HOSPADM

## 2024-09-12 RX ORDER — CELECOXIB 200 MG/1
200 CAPSULE ORAL ONCE
Status: COMPLETED | OUTPATIENT
Start: 2024-09-12 | End: 2024-09-12

## 2024-09-12 RX ORDER — DEXAMETHASONE SODIUM PHOSPHATE 4 MG/ML
INJECTION, SOLUTION INTRA-ARTICULAR; INTRALESIONAL; INTRAMUSCULAR; INTRAVENOUS; SOFT TISSUE PRN
Status: DISCONTINUED | OUTPATIENT
Start: 2024-09-12 | End: 2024-09-12 | Stop reason: SURG

## 2024-09-12 RX ORDER — POLYETHYLENE GLYCOL 3350 17 G/17G
1 POWDER, FOR SOLUTION ORAL 2 TIMES DAILY PRN
Status: DISCONTINUED | OUTPATIENT
Start: 2024-09-12 | End: 2024-09-16 | Stop reason: HOSPADM

## 2024-09-12 RX ORDER — OXYCODONE HYDROCHLORIDE 10 MG/1
10 TABLET ORAL
Status: DISCONTINUED | OUTPATIENT
Start: 2024-09-12 | End: 2024-09-16 | Stop reason: HOSPADM

## 2024-09-12 RX ORDER — ONDANSETRON 2 MG/ML
INJECTION INTRAMUSCULAR; INTRAVENOUS PRN
Status: DISCONTINUED | OUTPATIENT
Start: 2024-09-12 | End: 2024-09-12 | Stop reason: SURG

## 2024-09-12 RX ORDER — OXYCODONE HCL 5 MG/5 ML
10 SOLUTION, ORAL ORAL
Status: COMPLETED | OUTPATIENT
Start: 2024-09-12 | End: 2024-09-12

## 2024-09-12 RX ORDER — SODIUM CHLORIDE 9 MG/ML
INJECTION, SOLUTION INTRAMUSCULAR; INTRAVENOUS; SUBCUTANEOUS
Status: DISCONTINUED | OUTPATIENT
Start: 2024-09-12 | End: 2024-09-12 | Stop reason: HOSPADM

## 2024-09-12 RX ORDER — SODIUM CHLORIDE, SODIUM LACTATE, POTASSIUM CHLORIDE, CALCIUM CHLORIDE 600; 310; 30; 20 MG/100ML; MG/100ML; MG/100ML; MG/100ML
INJECTION, SOLUTION INTRAVENOUS CONTINUOUS
Status: DISCONTINUED | OUTPATIENT
Start: 2024-09-12 | End: 2024-09-12

## 2024-09-12 RX ORDER — OXYCODONE HCL 5 MG/5 ML
5 SOLUTION, ORAL ORAL
Status: COMPLETED | OUTPATIENT
Start: 2024-09-12 | End: 2024-09-12

## 2024-09-12 RX ORDER — DIPHENHYDRAMINE HYDROCHLORIDE 50 MG/ML
12.5 INJECTION INTRAMUSCULAR; INTRAVENOUS
Status: DISCONTINUED | OUTPATIENT
Start: 2024-09-12 | End: 2024-09-12 | Stop reason: HOSPADM

## 2024-09-12 RX ORDER — ONDANSETRON 2 MG/ML
4 INJECTION INTRAMUSCULAR; INTRAVENOUS EVERY 4 HOURS PRN
Status: DISCONTINUED | OUTPATIENT
Start: 2024-09-12 | End: 2024-09-16 | Stop reason: HOSPADM

## 2024-09-12 RX ORDER — OXYCODONE HYDROCHLORIDE 5 MG/1
5 TABLET ORAL
Status: DISCONTINUED | OUTPATIENT
Start: 2024-09-12 | End: 2024-09-16 | Stop reason: HOSPADM

## 2024-09-12 RX ORDER — DOCUSATE SODIUM 100 MG/1
100 CAPSULE, LIQUID FILLED ORAL 2 TIMES DAILY
Status: DISCONTINUED | OUTPATIENT
Start: 2024-09-12 | End: 2024-09-16 | Stop reason: HOSPADM

## 2024-09-12 RX ORDER — HYDROMORPHONE HYDROCHLORIDE 1 MG/ML
0.1 INJECTION, SOLUTION INTRAMUSCULAR; INTRAVENOUS; SUBCUTANEOUS
Status: DISCONTINUED | OUTPATIENT
Start: 2024-09-12 | End: 2024-09-12 | Stop reason: HOSPADM

## 2024-09-12 RX ORDER — TRANEXAMIC ACID 100 MG/ML
INJECTION, SOLUTION INTRAVENOUS PRN
Status: DISCONTINUED | OUTPATIENT
Start: 2024-09-12 | End: 2024-09-12 | Stop reason: SURG

## 2024-09-12 RX ORDER — ONDANSETRON 2 MG/ML
4 INJECTION INTRAMUSCULAR; INTRAVENOUS
Status: COMPLETED | OUTPATIENT
Start: 2024-09-12 | End: 2024-09-12

## 2024-09-12 RX ORDER — CEFAZOLIN SODIUM 1 G/3ML
2 INJECTION, POWDER, FOR SOLUTION INTRAMUSCULAR; INTRAVENOUS ONCE
Status: COMPLETED | OUTPATIENT
Start: 2024-09-12 | End: 2024-09-12

## 2024-09-12 RX ORDER — SODIUM PHOSPHATE,MONO-DIBASIC 19G-7G/118
1 ENEMA (ML) RECTAL
Status: DISCONTINUED | OUTPATIENT
Start: 2024-09-12 | End: 2024-09-16 | Stop reason: HOSPADM

## 2024-09-12 RX ORDER — KETOROLAC TROMETHAMINE 30 MG/ML
INJECTION, SOLUTION INTRAMUSCULAR; INTRAVENOUS
Status: DISCONTINUED | OUTPATIENT
Start: 2024-09-12 | End: 2024-09-12 | Stop reason: HOSPADM

## 2024-09-12 RX ORDER — ACETAMINOPHEN 325 MG/1
650 TABLET ORAL EVERY 6 HOURS
Status: DISCONTINUED | OUTPATIENT
Start: 2024-09-12 | End: 2024-09-16 | Stop reason: HOSPADM

## 2024-09-12 RX ORDER — AMOXICILLIN 250 MG
1 CAPSULE ORAL NIGHTLY
Status: DISCONTINUED | OUTPATIENT
Start: 2024-09-12 | End: 2024-09-16 | Stop reason: HOSPADM

## 2024-09-12 RX ADMIN — MAGNESIUM SULFATE HEPTAHYDRATE 2 G: 2 INJECTION, SOLUTION INTRAVENOUS at 12:43

## 2024-09-12 RX ADMIN — ROCURONIUM BROMIDE 50 MG: 50 INJECTION, SOLUTION INTRAVENOUS at 12:31

## 2024-09-12 RX ADMIN — TRANEXAMIC ACID 1000 MG: 100 INJECTION, SOLUTION INTRAVENOUS at 13:27

## 2024-09-12 RX ADMIN — FENTANYL CITRATE 50 MCG: 50 INJECTION, SOLUTION INTRAMUSCULAR; INTRAVENOUS at 13:40

## 2024-09-12 RX ADMIN — SUGAMMADEX 150 MG: 100 INJECTION, SOLUTION INTRAVENOUS at 13:33

## 2024-09-12 RX ADMIN — TRANEXAMIC ACID 1000 MG: 100 INJECTION, SOLUTION INTRAVENOUS at 12:28

## 2024-09-12 RX ADMIN — FENTANYL CITRATE 50 MCG: 50 INJECTION, SOLUTION INTRAMUSCULAR; INTRAVENOUS at 13:16

## 2024-09-12 RX ADMIN — CEFAZOLIN 2 G: 2 INJECTION, POWDER, FOR SOLUTION INTRAMUSCULAR; INTRAVENOUS at 17:10

## 2024-09-12 RX ADMIN — ACETAMINOPHEN 650 MG: 325 TABLET ORAL at 23:19

## 2024-09-12 RX ADMIN — ONDANSETRON 4 MG: 2 INJECTION INTRAMUSCULAR; INTRAVENOUS at 14:04

## 2024-09-12 RX ADMIN — SODIUM CHLORIDE, POTASSIUM CHLORIDE, SODIUM LACTATE AND CALCIUM CHLORIDE: 600; 310; 30; 20 INJECTION, SOLUTION INTRAVENOUS at 11:45

## 2024-09-12 RX ADMIN — LIDOCAINE HYDROCHLORIDE 80 MG: 20 INJECTION, SOLUTION EPIDURAL; INFILTRATION; INTRACAUDAL at 12:31

## 2024-09-12 RX ADMIN — FENTANYL CITRATE 100 MCG: 50 INJECTION, SOLUTION INTRAMUSCULAR; INTRAVENOUS at 12:31

## 2024-09-12 RX ADMIN — OXYCODONE HYDROCHLORIDE 10 MG: 5 SOLUTION ORAL at 14:27

## 2024-09-12 RX ADMIN — FENTANYL CITRATE 50 MCG: 50 INJECTION, SOLUTION INTRAMUSCULAR; INTRAVENOUS at 14:28

## 2024-09-12 RX ADMIN — ONDANSETRON 4 MG: 2 INJECTION INTRAMUSCULAR; INTRAVENOUS at 12:43

## 2024-09-12 RX ADMIN — ACETAMINOPHEN 1000 MG: 500 TABLET ORAL at 11:48

## 2024-09-12 RX ADMIN — ACETAMINOPHEN 650 MG: 325 TABLET ORAL at 17:38

## 2024-09-12 RX ADMIN — FENTANYL CITRATE 25 MCG: 50 INJECTION, SOLUTION INTRAMUSCULAR; INTRAVENOUS at 14:56

## 2024-09-12 RX ADMIN — PROPOFOL 150 MG: 10 INJECTION, EMULSION INTRAVENOUS at 12:31

## 2024-09-12 RX ADMIN — CELECOXIB 200 MG: 200 CAPSULE ORAL at 11:48

## 2024-09-12 RX ADMIN — SENNOSIDES AND DOCUSATE SODIUM 1 TABLET: 50; 8.6 TABLET ORAL at 20:05

## 2024-09-12 RX ADMIN — DOCUSATE SODIUM 100 MG: 100 CAPSULE, LIQUID FILLED ORAL at 17:38

## 2024-09-12 RX ADMIN — CEFAZOLIN 2 G: 1 INJECTION, POWDER, FOR SOLUTION INTRAMUSCULAR; INTRAVENOUS at 12:31

## 2024-09-12 RX ADMIN — DEXAMETHASONE SODIUM PHOSPHATE 8 MG: 4 INJECTION INTRA-ARTICULAR; INTRALESIONAL; INTRAMUSCULAR; INTRAVENOUS; SOFT TISSUE at 12:43

## 2024-09-12 RX ADMIN — GLYCOPYRROLATE 0.2 MG: 0.2 INJECTION INTRAMUSCULAR; INTRAVENOUS at 12:30

## 2024-09-12 ASSESSMENT — SOCIAL DETERMINANTS OF HEALTH (SDOH)
WITHIN THE PAST 12 MONTHS, YOU WORRIED THAT YOUR FOOD WOULD RUN OUT BEFORE YOU GOT THE MONEY TO BUY MORE: SOMETIMES TRUE
IN THE PAST 12 MONTHS, HAS THE ELECTRIC, GAS, OIL, OR WATER COMPANY THREATENED TO SHUT OFF SERVICE IN YOUR HOME?: NO
WITHIN THE LAST YEAR, HAVE YOU BEEN KICKED, HIT, SLAPPED, OR OTHERWISE PHYSICALLY HURT BY YOUR PARTNER OR EX-PARTNER?: NO
WITHIN THE LAST YEAR, HAVE YOU BEEN HUMILIATED OR EMOTIONALLY ABUSED IN OTHER WAYS BY YOUR PARTNER OR EX-PARTNER?: NO
WITHIN THE PAST 12 MONTHS, THE FOOD YOU BOUGHT JUST DIDN'T LAST AND YOU DIDN'T HAVE MONEY TO GET MORE: SOMETIMES TRUE
WITHIN THE LAST YEAR, HAVE TO BEEN RAPED OR FORCED TO HAVE ANY KIND OF SEXUAL ACTIVITY BY YOUR PARTNER OR EX-PARTNER?: NO
WITHIN THE LAST YEAR, HAVE YOU BEEN AFRAID OF YOUR PARTNER OR EX-PARTNER?: NO

## 2024-09-12 ASSESSMENT — COGNITIVE AND FUNCTIONAL STATUS - GENERAL
CLIMB 3 TO 5 STEPS WITH RAILING: A LOT
SUGGESTED CMS G CODE MODIFIER MOBILITY: CK
DRESSING REGULAR LOWER BODY CLOTHING: A LITTLE
STANDING UP FROM CHAIR USING ARMS: A LITTLE
MOVING TO AND FROM BED TO CHAIR: A LITTLE
HELP NEEDED FOR BATHING: A LITTLE
SUGGESTED CMS G CODE MODIFIER DAILY ACTIVITY: CJ
WALKING IN HOSPITAL ROOM: A LITTLE
MOBILITY SCORE: 17
TOILETING: A LITTLE
DAILY ACTIVITIY SCORE: 21
MOVING FROM LYING ON BACK TO SITTING ON SIDE OF FLAT BED: A LITTLE
TURNING FROM BACK TO SIDE WHILE IN FLAT BAD: A LITTLE

## 2024-09-12 ASSESSMENT — LIFESTYLE VARIABLES
ALCOHOL_USE: NO
AVERAGE NUMBER OF DAYS PER WEEK YOU HAVE A DRINK CONTAINING ALCOHOL: 0
TOTAL SCORE: 0
CONSUMPTION TOTAL: NEGATIVE
ON A TYPICAL DAY WHEN YOU DRINK ALCOHOL HOW MANY DRINKS DO YOU HAVE: 0
DOES PATIENT WANT TO STOP DRINKING: NO
TOTAL SCORE: 0
EVER FELT BAD OR GUILTY ABOUT YOUR DRINKING: NO
HOW MANY TIMES IN THE PAST YEAR HAVE YOU HAD 5 OR MORE DRINKS IN A DAY: 0
TOTAL SCORE: 0
HAVE YOU EVER FELT YOU SHOULD CUT DOWN ON YOUR DRINKING: NO
HAVE PEOPLE ANNOYED YOU BY CRITICIZING YOUR DRINKING: NO
EVER HAD A DRINK FIRST THING IN THE MORNING TO STEADY YOUR NERVES TO GET RID OF A HANGOVER: NO

## 2024-09-12 ASSESSMENT — PAIN DESCRIPTION - PAIN TYPE
TYPE: SURGICAL PAIN

## 2024-09-12 ASSESSMENT — PATIENT HEALTH QUESTIONNAIRE - PHQ9
2. FEELING DOWN, DEPRESSED, IRRITABLE, OR HOPELESS: NOT AT ALL
SUM OF ALL RESPONSES TO PHQ9 QUESTIONS 1 AND 2: 0
1. LITTLE INTEREST OR PLEASURE IN DOING THINGS: NOT AT ALL

## 2024-09-12 ASSESSMENT — PAIN SCALES - GENERAL: PAIN_LEVEL: 3

## 2024-09-12 NOTE — OR NURSING
1350- To PACU from OR via rney, sleeping, respirations spontaneous and non-labored.  Ice pack applied over c/d/I right hip surgical cordell dressing with flashing green light. RLE:  pedal pulse 2+ cap refill less than 3 seconds, warm, pink.     1400-Pt shivering applied hans warmer at this time.     1404- Shivering resolved. Pt c/o nausea. Pt medicated. SEE MAR. Pt unable to verbalize pain scale at this time. Concerned about nausea.    1405- Pt falls to sleep. Not roused at this time.     1420- Pt rouses. Pt restless in the bed. Pt able to verbalize right hip pain is 8/10.     1427-Pt given sips of water. Pt medicated for 8/10 right hip surgical pain. Pt medicated with IV and Oral pain medication.     1435- Pt reports pain is decreased to 3/10. Pt c/o continued nausea.     1445- Report to Geena NEGRON.     1515- Pt care resumed. Pt pain is 3/10     1530- Family updated. Pt sitting up drinking a soda and eating crackers. Pt pain is 3/10 an tolerable at this time. Awaiting bed for admit. Patient met criteria for transfer to room.     1600- Pt pain continues to be tolerable per patient. No nausea. Pt eating crackers. No needs. No change to surgical dressing.     1615-Report to Marivel NEGRON.     1630- Family updated on patient room assignment. Pt resting no needs.     1642- Patient transported via rArtemus with on oxygen 3 L on full tank.  Patient states good pain control. Denies N/V, tolerating PO well. Surgical dressing CDI. Ice pack sent with pt.

## 2024-09-12 NOTE — ANESTHESIA PROCEDURE NOTES
Airway    Date/Time: 9/12/2024 12:32 PM    Performed by: Mihai Griggs M.D.  Authorized by: Mihai Griggs M.D.    Location:  OR  Urgency:  Elective  Difficult Airway: No    Indications for Airway Management:  Anesthesia      Spontaneous Ventilation: absent    Sedation Level:  Deep  Preoxygenated: Yes    Patient Position:  Sniffing  MILS Maintained Throughout: No    Mask Difficulty Assessment:  2 - vent by mask + OA or adjuvant +/- NMBA  Final Airway Type:  Endotracheal airway  Final Endotracheal Airway:  ETT  Cuffed: Yes    Technique Used for Successful ETT Placement:  Direct laryngoscopy  Devices/Methods Used in Placement:  Anterior pressure/BURP    Insertion Site:  Oral  Blade Type:  Juana  Laryngoscope Blade/Videolaryngoscope Blade Size:  3  ETT Size (mm):  7.0  Measured from:  Lips  ETT to Lips (cm):  22  Placement Verified by: auscultation and capnometry    Cormack-Lehane Classification:  Grade I - full view of glottis  Number of Attempts at Approach:  1  Number of Other Approaches Attempted:  0

## 2024-09-12 NOTE — DISCHARGE INSTRUCTIONS
What to Expect Post Anesthesia    Rest and take it easy for the first 24 hours.  A responsible adult is recommended to remain with you during that time.  It is normal to feel sleepy.  We encourage you to not do anything that requires balance, judgment or coordination.    FOR 24 HOURS DO NOT:  Drive, operate machinery or run household appliances.  Drink beer or alcoholic beverages.  Make important decisions or sign legal documents.    To avoid nausea, slowly advance diet as tolerated, avoiding spicy or greasy foods for the first day.  Add more substantial food to your diet according to your provider's instructions.  Babies can be fed formula or breast milk as soon as they are hungry.  INCREASE FLUIDS AND FIBER TO AVOID CONSTIPATION.    MILD FLU-LIKE SYMPTOMS ARE NORMAL.  YOU MAY EXPERIENCE GENERALIZED MUSCLE ACHES, THROAT IRRITATION, HEADACHE AND/OR SOME NAUSEA.    If any questions arise, call your provider.  If your provider is not available, please feel free to call the Surgical Center at [phone].    MEDICATIONS: Resume taking daily medication.  Take prescribed pain medication with food.  If no medication is prescribed, you may take non-aspirin pain medication if needed.  PAIN MEDICATION CAN BE VERY CONSTIPATING.  Take a stool softener or laxative such as senokot, pericolace, or milk of magnesia if needed.    Last pain medication given at     Diet    Resume your normal diet as tolerated.  A diet low in cholesterol, fat, and sodium is recommended for good health.

## 2024-09-12 NOTE — PROGRESS NOTES
4 Eyes Skin Assessment Completed by JAMIL Burgess and JAMIL Bailey.    Head WDL  Ears WDL  Nose WDL  Mouth WDL  Neck WDL  Breast/Chest WDL  Shoulder Blades WDL  Spine WDL  (R) Arm/Elbow/Hand WDL  (L) Arm/Elbow/Hand WDL  Abdomen WDL  Groin WDL  Scrotum/Coccyx/Buttocks WDL  (R) Leg Incision R hip, CDI  (L) Leg WDL  (R) Heel/Foot/Toe dry   (L) Heel/Foot/Toe dry          Devices In Places Blood Pressure Cuff, Pulse Ox, SCD's, and Nasal Cannula      Interventions In Place Gray Ear Foams and Pillows    Possible Skin Injury No    Pictures Uploaded Into Epic N/A  Wound Consult Placed N/A  RN Wound Prevention Protocol Ordered No

## 2024-09-12 NOTE — ANESTHESIA TIME REPORT
Anesthesia Start and Stop Event Times       Date Time Event    9/12/2024 1205 Ready for Procedure     1227 Anesthesia Start     1354 Anesthesia Stop          Responsible Staff  09/12/24      Name Role Begin End    Mihai Griggs M.D. Anesth [id number]          Overtime Reason:  no overtime (within assigned shift)    Comments:

## 2024-09-12 NOTE — OR NURSING
Pt allergies and NPO status verified. Home medications reconciled, preferred pharmacy verified. Belongings secured in locker. Pt verbalizes understanding of pain scale, expected course of stay, and plan of care. Surgical site verified with pt and MD. Triple aim completed. IV access established. All questions answered. Bed in lowest position, call light within reach.

## 2024-09-12 NOTE — H&P
Surgery Orthopedic History & Physical Note    Date  9/12/2024    Primary Care Physician  Vannesa Bernard M.D.    CC  Pre-Op Diagnosis Codes:      * Primary osteoarthritis of right hip [M16.11]    HPI  This is a 64 y.o. 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        acetaminophen (Tylenol) tablet 1,000 mg  1,000 mg Oral Once Mihai Griggs M.D.        lactated ringers infusion   Intravenous Continuous Ramesh Pretty M.D.        ceFAZolin (Ancef) injection 2 g  2 g Intravenous Once Ramesh Pretty M.D.           Social History     Socioeconomic History    Marital status: Single     Spouse name: Not on file    Number of children: Not on file    Years of education: Not on file    Highest education level: Some college, no degree   Occupational History    Not on file   Tobacco Use    Smoking status: Every Day     Current packs/day: 0.50     Average packs/day: 0.5 packs/day for 45.0 years (22.5 ttl pk-yrs)     Types: Cigarettes    Smokeless tobacco: Never    Tobacco comments:     previous 1 ppd for many years   Vaping Use    Vaping status: Never Used   Substance and Sexual  Activity    Alcohol use: Not Currently     Comment: previous heavy alchol use, 1/2 gallon hard alcohol daily or 12 beers for 10 years, cut back years ago    Drug use: Not Currently     Comment: previous meth use    Sexual activity: Not Currently     Partners: Male     Birth control/protection: Post-Menopausal   Other Topics Concern    Not on file   Social History Narrative    Not on file     Social Determinants of Health     Financial Resource Strain: Medium Risk (3/27/2024)    Overall Financial Resource Strain (CARDIA)     Difficulty of Paying Living Expenses: Somewhat hard   Food Insecurity: Food Insecurity Present (3/27/2024)    Hunger Vital Sign     Worried About Running Out of Food in the Last Year: Sometimes true     Ran Out of Food in the Last Year: Sometimes true   Transportation Needs: Unmet Transportation Needs (3/27/2024)    PRAPARE - Transportation     Lack of Transportation (Medical): No     Lack of Transportation (Non-Medical): Yes   Physical Activity: Inactive (3/27/2024)    Exercise Vital Sign     Days of Exercise per Week: 0 days     Minutes of Exercise per Session: 30 min   Stress: No Stress Concern Present (3/27/2024)    [place] [employer] - Occupational Stress Questionnaire     Feeling of Stress : Only a little   Social Connections: Socially Isolated (3/27/2024)    Social Connection and Isolation Panel [NHANES]     Frequency of Communication with Friends and Family: Once a week     Frequency of Social Gatherings with Friends and Family: Once a week     Attends Yazidism Services: Never     Active Member of Clubs or Organizations: No     Attends Club or Organization Meetings: Not on file     Marital Status:    Intimate Partner Violence: Not on file   Housing Stability: High Risk (3/27/2024)    Housing Stability Vital Sign     Unable to Pay for Housing in the Last Year: Yes     Number of Places Lived in the Last Year: 1     Unstable Housing in the Last Year: No        Family History   Problem Relation Age of Onset    Arthritis Mother     Diabetes Father     Hypertension Father     Heart Attack Father     Heart Disease Maternal Grandmother     Lung Disease Maternal Grandfather     Hypertension Maternal Aunt     Cancer Neg Hx     Stroke Neg Hx     Hyperlipidemia Neg Hx        Allergies  Albuterol    Review of Systems  Negative    Physical Exam    Vital Signs  Blood Pressure: (!) 150/67   Temperature: 36.3 °C (97.3 °F)   Pulse: (!) 48   Respiration: 18   Pulse Oximetry: 97 %     Alert/oriented  No labored breathing  Heart - regular    Labs:                    Radiology:  DX-HIP-UNILATERAL-WITH PELVIS-1 VIEW RIGHT    (Results Pending)   DX-PORTABLE FLUORO > 1 HOUR    (Results Pending)         Assessment/Plan:  Pre-Op Diagnosis Codes:      * Primary osteoarthritis of right hip [M16.11]  Procedure(s):  RIGHT ANTERIOR TOTAL HIP ARTHROPLASTY

## 2024-09-12 NOTE — ANESTHESIA POSTPROCEDURE EVALUATION
Patient: Yola Campuzano    Procedure Summary       Date: 09/12/24 Room / Location: [place] [address] / SURGERY [facility]    Anesthesia Start: 1227 Anesthesia Stop: 1354    Procedure: RIGHT ANTERIOR TOTAL HIP ARTHROPLASTY (Right: Hip) Diagnosis:       Primary osteoarthritis of right hip      (Primary osteoarthritis of right hip [M16.11])    Surgeons: Ramesh Pretty M.D. Responsible Provider: Mihai Griggs M.D.    Anesthesia Type: general ASA Status: 2            Final Anesthesia Type: general  Last vitals  BP   Blood Pressure: (!) 144/66    Temp   36.4 °C (97.5 °F)    Pulse   (!) 46   Resp   16    SpO2   99 %      Anesthesia Post Evaluation    Patient location during evaluation: PACU  Patient participation: complete - patient participated  Level of consciousness: awake and alert  Pain score: 3    Airway patency: patent  Anesthetic complications: no  Cardiovascular status: hemodynamically stable  Respiratory status: acceptable  Hydration status: euvolemic    PONV: none          There were no known notable events for this encounter.     Nurse Pain Score: 3 (NPRS)

## 2024-09-12 NOTE — FACE TO FACE
Face to Face Supporting Documentation - Home Health    The encounter with this patient was in whole or in part the primary reason for home health admission.    Date of encounter:   Patient:                    MRN:                       YOB: 2024  Yola Campuzano  [id number]  1960     Home health to see patient for:  Skilled Nursing care for assessment, interventions & education and Physical Therapy evaluation and treatment    Skilled need for:  Surgical Aftercare MIKHAIL    Skilled nursing interventions to include:  Comment: Safety check    Homebound status evidenced by:  Need the aid of supportive devices such as crutches, canes, wheelchairs or walkers, Require the use of special transportation, or Needs the assistance of another person in order to leave the home. Leaving home requires a considerable and taxing effort. There is a normal inability to leave the home.    Community Physician to provide follow up care: Vannesa Bernard M.D.     Optional Interventions? No      I certify the face to face encounter for this home health care referral meets the CMS requirements and the encounter/clinical assessment with the patient was, in whole, or in part, for the medical condition(s) listed above, which is the primary reason for home health care. Based on my clinical findings: the service(s) are medically necessary, support the need for home health care, and the homebound criteria are met.  I certify that this patient has had a face to face encounter by myself.  Ramesh Pretty M.D. - NPI: [id number]

## 2024-09-12 NOTE — OP REPORT
DATE OF PROCEDURE: 9/12/2024    PREOPERATIVE DIAGNOSIS: Degenerative joint disease, right hip    POSTOPERATIVE DIAGNOSIS: Degenerative joint disease, right hip    PROCEDURE: Direct anterior right total hip arthroplasty    SURGEON: Ramesh Pretty MD    ASSISTANT: Jose Antonio Pinto PA-C     ANESTHESIA: Mihai Griggs MD    ANESTHETIC: General    EBL:  250 cc    IMPLANTS:     1.  Campos Trident II TriTanium cluster hole acetabulum, 52mm E (2 acetabular screws: 25 mm, 20 mm)  2.  Trident X3 36 mm E polyethylene insert, 0 degrees  3.  Size 3 standard Insignia femoral stem  4.  36 mm/-5 mm Biolox delta ceramic V40    INDICATIONS: Yola is 64 years old and has arthritis in both hips.  This is worse radiographically on the right side and this is the side that hurts the most.  She wishes to proceed with total hip arthroplasty.  Risks were discussed and these include bleeding, infection, neurovascular injury, pain, stiffness, implant failure, leg length discrepancy, fracture, dislocation, thromboembolic phenomenon, anesthetic and medical complications etc.    PROCEDURE DESCRIPTION: Patient was identified in the preoperative holding area and her right hip was marked.  She was taken to the operating room where a general anesthetic was administered.  Intravenous antibiotics and tranexamic acid were given.  Boots and SCDs were placed and then she was transferred to the [place] table and the boots were attached to the leg extensions.  Anterior abdominal soft tissue was taped to the opposite side of the bed.  The anterolateral hip and thigh was then prepped and draped in sterile fashion.  A timeout was held to confirm the patient's identity and correct surgical site.    Longitudinal incision was made distal and lateral to the ASIS down to the fascia overlying the tensor fascia fidelina.  This fascia was incised longitudinally and then the TFL was elevated off the medial fascia and retracted laterally.  Deep fascia was  incised and the lateral circumflex vessels were cauterized.  Precapsular fat was excised.  Capsulotomy was performed.  Anterior and posterior limbs of the capsule were tagged.  Retractors were placed around the medial and lateral aspect of the femoral neck.  Femoral neck was then cut and the femoral head was removed.    Labrum was excised.  I reamed medially at 48 mm and then sequentially up to 52 mm.  I then placed the cup in standard fashion using fluoroscopy to guide placement.  I secured this with 2 screws into the ilium.  The cup was washed and dried and then the liner was placed in standard fashion.  The liner sat just behind the anterior wall of the acetabulum.    We then progressively extended, adducted and externally rotated the right leg while releasing the superolateral capsule up to the ilium and then the soft tissue from medial surface of the greater trochanter.  This allowed for elevation of the proximal femur into the wound.  I then opened up the medullary canal and used a rongeur to remove lateral bone.  Then broached up to size 2 where there was good axial and rotational stability of the broach although broach was slightly in varus and undersized.  We trialed and found good stability and right leg about 2 mm long using a -5 head.  I felt this was optimal as she has degenerative changes on the left side and may need arthroplasty there at which time we could equalize in the discrepancy.  Hip was dislocated and trials were removed.  I reamed broached up to size 3 after removing a small amount of lateral bone.  This broach was removed.    The femoral stem was inserted.  Jaimes taper was washed and dried.  Femoral head was placed in standard fashion.  Hip was reduced.  Fluoroscopic image showed femoral stem contained within the bone and good stem position and hip reduction.    Dilute Betadine soak was performed and then the hip was irrigated.  Additional dose of TXA was given.  Local anesthetic mixture  was injected in the pericapsular tissues.  The capsule was repaired with #1 Vicryl.  Fascia over the TFL was repaired with #1 Vicryl.  Skin was closed with 2-0 Vicryl and 3-0 Monocryl.  Dermabond was applied followed by Acticoat and a cordell VAC.  The patient was taken off of the operating table and out of the boots.  I noted right leg approximate 2 mm long.  She was then taken to the recovery room in stable condition.    POSTOPERATIVE PLAN:     1.  Observation overnight with pain control and therapy  2.  Weightbearing as tolerated with walker as needed  3.  DVT prophylaxis with early mobilization and aspirin 81 mg p.o. twice daily for 4 weeks  4.  Wound check and baseline radiographs in approximately 10-14 days

## 2024-09-12 NOTE — ANESTHESIA PREPROCEDURE EVALUATION
Case: [id number] Date/Time: 09/12/24 1245    Procedure: RIGHT ANTERIOR TOTAL HIP ARTHROPLASTY    Diagnosis: Primary osteoarthritis of right hip [M16.11]    Pre-op diagnosis: Primary osteoarthritis of right hip [M16.11]    Location: [place] [address] / SURGERY [facility]    Surgeons: Ramesh Pretty M.D.            Relevant Problems   GI   (positive) GERD (gastroesophageal reflux disease)      Other   (positive) Avascular necrosis of bone of hip, right (HCC)   (positive) Dyslipidemia   (positive) Elevated alkaline phosphatase level   (positive) History of Guillain-[place] syndrome   (positive) Primary osteoarthritis of right hip   (positive) Right hip pain   (positive) Tobacco dependence       Physical Exam    Airway   Mallampati: II  TM distance: >3 FB  Neck ROM: full       Cardiovascular - normal exam  Rhythm: regular  Rate: normal  (-) murmur     Dental - normal exam  (+) upper dentures, lower dentures           Pulmonary - normal exam  Breath sounds clear to auscultation     Abdominal    Neurological - normal exam                   Anesthesia Plan    ASA 2       Plan - general       Airway plan will be ETT        Plan Factors:   Patient was previously instructed to abstain from smoking on day of procedure.  Patient did not smoke on day of procedure.      Induction: intravenous    Postoperative Plan: Postoperative administration of opioids is intended.    Pertinent diagnostic labs and testing reviewed    Informed Consent:    Anesthetic plan and risks discussed with patient.    Use of blood products discussed with: patient whom consented to blood products.

## 2024-09-12 NOTE — LETTER
August 14, 2024    Patient Name: Yola Campuzano  Surgeon Name: Ramesh Pretty M.D.  Surgery Facility: [facility] ([address] [facility])  Surgery Date: 9/12/2024    The time of your surgery is not final and may change up to and until the day of your surgery. You will be contacted 24-48 hours prior to your surgery date with your check-in and surgery time.    If you will not be at one of the below numbers please call the surgery scheduler at [phone]  Preferred Phone: [phone]    BEFORE YOUR SURGERY   Pre Registration and/or Lab Work must be done within and no earlier than 28 days prior to your surgery date. Your scheduled facility will contact you regarding all required preregistration and/or lab work. If you have not been contacted within 7 days of your scheduled procedure please call [facility] at [phone] for an appointment as soon as possible.    Pre op Appointment:   Date: 9.3.24   Time: 1:15 PM   Provider: Ramesh Pretty MD   Location: [address] [place] [id number]  Instructions: Bring a list of all medications you are taking including the dosing and frequency.    DAY OF YOUR SURGERY  Nothing to eat or drink after midnight     Refrain from smoking any substance after midnight prior to surgery. Smoking may interfere with the anesthetic and frequently produces nausea during the recovery period.    Continue taking all lifesaving medications. Including the morning of your surgery with small sip of water.      Please do NOT take on the day of surgery:  Diuretics: examples- furosemide (Lasix), spironolactone, hydrochlorothiazide  ACE-inhibitors: examples- lisinopril, ramipril, enalapril  “ARBs”: examples- losartan, Olmesartan, valsartan    Please arrive at the hospital/surgery center at the check-in time provided.     An adult will need to bring you and take you home after your surgery.     AFTER YOUR SURGERY  Post op  Appointment:   Date: 9.25.24   Time: 2:45 PM   With: Fernando Pinto PA-C   Location: [address] [place] [address]    - No dental work for 3-6 months after your surgery.  - Post Surgery - You will need someone to drive you home  - Post Surgery - You will need someone to stay with you for 24 hours     TIME OFF WORK  FMLA or Disability forms can be faxed directly to: [phone] or you may drop them off at [address] [place] [address]. Our office charges a $35.00 fee per form. Forms will be completed within 10 business days of drop off and payment received. For the status of your forms you may contact our disability office directly [phone].    MEDICATION INSTRUCTIONS **Please read section completely**  The following medications should be stopped a minimum of 10 days prior to surgery:  All over the counter, Supplements & Herbal medications    Anorectics: Phentermine (Adipex-P, Lomaira and Suprenza), Phentermine-topiramate (Qsymia), Bupropion-naltrexone (Contrave)    **If you are on Bupropion for anxiety/depression, please continue this medication up until the day of surgery.     Opiod Partial Agonists/Opioid Antagonists: Buprenorphine (Suboxone, Belbuca, Butrans, Probuphine Implant, Sublocade), Naltrexone (ReVia, Vivitrol), Naloxone    Amphetamines: Dextroamphetamine/Amphetamine (Adderall, Mydayis), Methylphenidate Hydrochloride (Concerta, Metadate, Methylin, Ritalin)    The following medications should be stopped 5 days prior to surgery:  Blood Thinners: Any Aspirin, Aspirin products, anti-inflammatories such as ibuprofen and any blood thinners such as Coumadin and Plavix. Please consult your prescribing physician if you are on life saving blood thinners, in regards to when to stop medications prior to surgery.     The following medications should be stopped a minimum of 3 days prior to surgery:  PDE-5 inhibitors: Sildenafil (Viagra), Tadalafil (Cialis), Vardenafil (Levitra),  Avanafil (Stendra)    MAO Inhibitors: Rasagiline (Azilect), Selegiline (Eldepryl, Emsam, Selapar), Isocarboxazid (Marplan), Phenelzine (Nardil)

## 2024-09-13 PROCEDURE — 97162 PT EVAL MOD COMPLEX 30 MIN: CPT

## 2024-09-13 PROCEDURE — 97535 SELF CARE MNGMENT TRAINING: CPT

## 2024-09-13 PROCEDURE — 700102 HCHG RX REV CODE 250 W/ 637 OVERRIDE(OP): Performed by: ORTHOPAEDIC SURGERY

## 2024-09-13 PROCEDURE — A9270 NON-COVERED ITEM OR SERVICE: HCPCS | Performed by: ORTHOPAEDIC SURGERY

## 2024-09-13 PROCEDURE — 97116 GAIT TRAINING THERAPY: CPT

## 2024-09-13 PROCEDURE — 97165 OT EVAL LOW COMPLEX 30 MIN: CPT

## 2024-09-13 PROCEDURE — 99024 POSTOP FOLLOW-UP VISIT: CPT | Performed by: ORTHOPAEDIC SURGERY

## 2024-09-13 PROCEDURE — 94760 N-INVAS EAR/PLS OXIMETRY 1: CPT

## 2024-09-13 PROCEDURE — G0378 HOSPITAL OBSERVATION PER HR: HCPCS

## 2024-09-13 PROCEDURE — 97110 THERAPEUTIC EXERCISES: CPT

## 2024-09-13 RX ADMIN — ASPIRIN 81 MG: 81 TABLET, COATED ORAL at 16:45

## 2024-09-13 RX ADMIN — ACETAMINOPHEN 650 MG: 325 TABLET ORAL at 16:46

## 2024-09-13 RX ADMIN — ACETAMINOPHEN 650 MG: 325 TABLET ORAL at 23:04

## 2024-09-13 RX ADMIN — OXYCODONE HYDROCHLORIDE 5 MG: 5 TABLET ORAL at 09:23

## 2024-09-13 RX ADMIN — SENNOSIDES AND DOCUSATE SODIUM 1 TABLET: 50; 8.6 TABLET ORAL at 21:19

## 2024-09-13 RX ADMIN — ACETAMINOPHEN 650 MG: 325 TABLET ORAL at 11:30

## 2024-09-13 RX ADMIN — DOCUSATE SODIUM 100 MG: 100 CAPSULE, LIQUID FILLED ORAL at 16:45

## 2024-09-13 RX ADMIN — OXYCODONE HYDROCHLORIDE 5 MG: 5 TABLET ORAL at 23:04

## 2024-09-13 RX ADMIN — ASPIRIN 81 MG: 81 TABLET, COATED ORAL at 07:36

## 2024-09-13 RX ADMIN — ACETAMINOPHEN 650 MG: 325 TABLET ORAL at 06:07

## 2024-09-13 RX ADMIN — DOCUSATE SODIUM 100 MG: 100 CAPSULE, LIQUID FILLED ORAL at 06:07

## 2024-09-13 ASSESSMENT — PAIN DESCRIPTION - PAIN TYPE
TYPE: SURGICAL PAIN
TYPE: SURGICAL PAIN

## 2024-09-13 ASSESSMENT — COGNITIVE AND FUNCTIONAL STATUS - GENERAL
DRESSING REGULAR LOWER BODY CLOTHING: A LITTLE
CLIMB 3 TO 5 STEPS WITH RAILING: A LITTLE
WALKING IN HOSPITAL ROOM: A LITTLE
MOBILITY SCORE: 21
HELP NEEDED FOR BATHING: A LITTLE
SUGGESTED CMS G CODE MODIFIER DAILY ACTIVITY: CJ
SUGGESTED CMS G CODE MODIFIER MOBILITY: CJ
DAILY ACTIVITIY SCORE: 22
STANDING UP FROM CHAIR USING ARMS: A LITTLE

## 2024-09-13 ASSESSMENT — GAIT ASSESSMENTS
ASSISTIVE DEVICE: FRONT WHEEL WALKER
GAIT LEVEL OF ASSIST: CONTACT GUARD ASSIST
DISTANCE (FEET): 140
DEVIATION: ANTALGIC;STEP TO;SHUFFLED GAIT

## 2024-09-13 ASSESSMENT — PATIENT HEALTH QUESTIONNAIRE - PHQ9
SUM OF ALL RESPONSES TO PHQ9 QUESTIONS 1 AND 2: 0
2. FEELING DOWN, DEPRESSED, IRRITABLE, OR HOPELESS: NOT AT ALL
1. LITTLE INTEREST OR PLEASURE IN DOING THINGS: NOT AT ALL

## 2024-09-13 ASSESSMENT — ACTIVITIES OF DAILY LIVING (ADL): TOILETING: INDEPENDENT

## 2024-09-13 NOTE — THERAPY
Occupational Therapy   Initial Evaluation     Patient Name: Yola Campuzano  Age:  64 y.o., Sex:  female  Medical Record #: [id number]  Today's Date: 9/13/2024     Precautions  Precautions: (P) Anterior Hip Precautions, Weight Bearing As Tolerated Right Lower Extremity, Fall Risk    Assessment  Patient is 64 y.o. female s/p R Beto, POD 1. Additional factors influencing patient status / progress: Lives alone, 15 stairs to enter apt. Pt is A&Ox4, pleasant and cooperative. Currently limited by impaired balance, decreased functional mobility and activity toelrance which is impacting ADL performance. Education on role of OT, DME/AE use, hip prec's, home safety during ADL's. Pt will benefit from post acute inpt rehab (vs HH if progresses anad meets goals in hosp) upon dc. OT will follow while in house.           Plan  Occupational Therapy Initial Treatment Plan   Treatment Interventions: (P) Self Care / Activities of Daily Living, Neuro Re-Education / Balance, Therapeutic Activity  Treatment Frequency: (P) 3 Times per Week  Duration: (P) Until Therapy Goals Met    DC Equipment Recommendations: Unable to determine at this time  Discharge Recommendations: Recommend post-acute placement for additional occupational therapy services prior to discharge home     Subjective  Agreeable     Objective   09/13/24 1043   Prior Living Situation   Prior Services None   Housing / Facility 1 Story House   Steps Into Home 15   Steps In Home 0   Elevator No   Bathroom Set up Bathtub / Shower Combination   Equipment Owned Single Point Cane   Lives with - Patient's Self Care Capacity Alone and Able to Care For Self   Comments Dtr unable to stay with pt upon dc.   Prior Level of ADL Function   Self Feeding Independent   Grooming / Hygiene Independent   Bathing Independent   Dressing Independent   Toileting Independent   Prior Level of IADL Function   Medication Management Independent   Laundry Independent   Kitchen Mobility Independent    Finances Independent   Home Management Independent   Shopping Independent   Prior Level Of Mobility Independent Without Device in Home   Driving / Transportation Unable To Determine At This Time   Occupation (Pre-Hospital Vocational) Unable To Determine At This Time   Leisure Interests Unable To Determine At This Time   History of Falls   History of Falls No   Precautions   Precautions Anterior Hip Precautions;Weight Bearing As Tolerated Right Lower Extremity;Fall Risk   Vitals   O2 Delivery Device None - Room Air   Pain 0 - 10 Group   Location Hip   Location Orientation Right   Therapist Pain Assessment Post Activity Pain Same as Prior to Activity;Nurse Notified   Cognition    Cognition / Consciousness WDL   Level of Consciousness Alert   Passive ROM Upper Body   Passive ROM Upper Body WDL   Active ROM Upper Body   Active ROM Upper Body  WDL   Strength Upper Body   Upper Body Strength  WDL   Sensation Upper Body   Upper Extremity Sensation  WDL   Upper Body Muscle Tone   Upper Body Muscle Tone  WDL   Neurological Concerns   Neurological Concerns No   Coordination Upper Body   Coordination WDL   Balance Assessment   Sitting Balance (Static) Good   Sitting Balance (Dynamic) Fair +   Standing Balance (Static) Fair +   Standing Balance (Dynamic) Fair   Weight Shift Sitting Good   Weight Shift Standing Fair   Bed Mobility    Supine to Sit Contact Guard Assist   ADL Assessment   Eating Modified Independent   Grooming Standby Assist;Standing   Upper Body Dressing Supervision   Lower Body Dressing Minimal Assist   Toileting Standby Assist   How much help from another person does the patient currently need...   Putting on and taking off regular lower body clothing? 3   Bathing (including washing, rinsing, and drying)? 3   Toileting, which includes using a toilet, bedpan, or urinal? 4   Putting on and taking off regular upper body clothing? 4   Taking care of personal grooming such as brushing teeth? 4   Eating meals? 4    6 Clicks Daily Activity Score 22   Functional Mobility   Sit to Stand Contact Guard Assist   Bed, Chair, Wheelchair Transfer Contact Guard Assist   Toilet Transfers Contact Guard Assist   Transfer Method Stand Step   Comments fww   Activity Tolerance   Sitting in Chair 1 hr   Sitting Edge of Bed 13   Standing 7   Short Term Goals   Short Term Goal # 1 LB dressing with spv within 5 days   Short Term Goal # 2 ADL transfers with Spv/Mod Indep within 5 days   Short Term Goal # 3 Toileting with Mod Indep within 5 days   Education Group   Education Provided Hip Precautions;Home Safety;Transfers;Activities of Daily Living   Role of Occupational Therapist Patient Response Patient;Verbal Demonstration;Explanation   Hip Precautions Patient Response Patient;Acceptance;Verbal Demonstration;Explanation   Home Safety Patient Response Patient;Acceptance;Verbal Demonstration;Explanation   Transfers Patient Response Patient;Acceptance;Verbal Demonstration;Action Demonstration;Explanation   ADL Patient Response Patient;Acceptance;Verbal Demonstration;Action Demonstration;Explanation   Occupational Therapy Initial Treatment Plan    Treatment Interventions Self Care / Activities of Daily Living;Neuro Re-Education / Balance;Therapeutic Activity   Treatment Frequency 3 Times per Week   Duration Until Therapy Goals Met   Problem List   Problem List Decreased Active Daily Living Skills;Decreased Homemaking Skills;Decreased Functional Mobility;Decreased Activity Tolerance;Impaired Postural Control / Balance   Anticipated Discharge Equipment and Recommendations   DC Equipment Recommendations Unable to determine at this time   Discharge Recommendations Recommend post-acute placement for additional occupational therapy services prior to discharge home   Interdisciplinary Plan of Care Collaboration   IDT Collaboration with  Nursing;Certified Nursing Assistant   Patient Position at End of Therapy Seated;Call Light within Reach;Tray Table  within Reach;Phone within Reach   Collaboration Comments OT Eval

## 2024-09-13 NOTE — PROGRESS NOTES
0700 - Bedside report received from JAMIL Toro. Alert and oriented X4, on RA in no acute respiratory distress. Daily plan of care discussed. Patient complains of tolerable pain. No other needs at this time. Call light and personal belongings within reach. Hourly rounding in place. Chart reviewed for recent labs, notes, and orders.

## 2024-09-13 NOTE — PROGRESS NOTES
POD#1  Pain controlled  AVSS  + DF/PF  Mobilize, WBAT w/walker  PT/OT  DVT proph (SCDs, ASA BID)  D/c planning (lives alone with one flight of stairs at home)

## 2024-09-13 NOTE — DISCHARGE PLANNING
Case Management Discharge Planning    Admission Date: 9/12/2024  GMLOS:    ALOS: 0    6-Clicks ADL Score: 21  6-Clicks Mobility Score: 21      Anticipated Discharge Dispo:      DME Needed: No    Action(s) Taken: Updated Provider/Nurse on Discharge Plan    Discussed pt with Charge RN. Pt has HH order from MD, pending PT/OT. Pt might need placement instead.     1330-  PT is recommending placement. Requested for DPA to send blanket SNF referrals to [facility].     Escalations Completed: None    Medically Clear: No    Next Steps: LSW to follow    Barriers to Discharge: Medical clearance and Pending Placement    Is the patient up for discharge tomorrow: No

## 2024-09-13 NOTE — CARE PLAN
The patient is Stable - Low risk of patient condition declining or worsening    Shift Goals  Clinical Goals: Ambulate 50 ft before 2300, void, and tolerant on  regular diet. Pain management to 4/10 or less for pt to sleep  Patient Goals: rest and sleep    Progress made toward(s) clinical / shift goals:  Pt ambulated up to 100 ft in hallway without wearing oxygen.Pt voided, and tolerated regular diet and snacks without N/V. Pain level was controlled by scheduled tylenol per MAR.  PRN pain medication not given. Pt not seen sleeping much during the rounds.     Patient is not progressing towards the following goals: N/A

## 2024-09-13 NOTE — PROGRESS NOTES
1700 - Patient arrived on floor from PACU, A&Ox4, on 1L NC, reviewed orders, and medication requisition and admission profile done. Oriented Patient to the floor. Plan of care discussed with Patient and answered questions asked. Safety precautions in place and hourly rounding established with CNA.

## 2024-09-13 NOTE — THERAPY
Physical Therapy   Initial Evaluation     Patient Name: Yola Campuzano  Age:  64 y.o., Sex:  female  Medical Record #: [id number]  Today's Date: 9/13/2024     Precautions  Precautions: (P) Fall Risk;Anterior Hip Precautions;Weight Bearing As Tolerated Right Lower Extremity    Assessment  Patient is 64 y.o. female who was recently seen s/p right MIKHAIL with WBAT orders for right LE and anterior hip precautions. Pt was agreeable to therapy evaluation demonstrated with impaired balance, impaired gait, pain, weakness, and poor upright activity tolerance. These impairments limited the patients ability to be able to ambulate house hold distances and manage 15 steps in order to enter her home. Pt was educated/trained on elevation, icing, positioning, supine/seated/standing therapeutic exercises, and anterior hip precautions. Pt was able to return demo correct mechanics with right LE placement prior to standing in order avoid breaking anterior hip precautions and was able to maintain precautions throughout session. Pt had a difficult time managing 5 steps with B railings and required frequent standing and seated rest breaks throughout session. Pt currently requires CGA for all upright mobility with FWW use. Pt was unable to ambulate back to her room and required transport chair due to increasing weakness, pain, and fatigue with upright mobility. Recommend post-acute placement for continued physical therapy services prior to discharge home. If pt progresses mobility while in acute care setting for safe d/c, pt may be able to d/c home with HH therapy services.     Pt was provided with following skilled therapy txt during session: Pt was provided with VC, demo, and TC for supine/seated therapeutic exercises in order to return demo correct mechanics. Pt was provided with education on intensity, frequency, and duration of exercises. Pt was provided with VC and demo during ambulation in order to return demo heel to toe gait  mechanics and correct use of AD. Pt was provided with demo and VC to go up/down steps with safe mechanics with the use of AD/railings.     Plan    Physical Therapy Initial Treatment Plan   Treatment Plan : (P) Bed Mobility, Equipment, Gait Training, Manual Therapy, Neuro Re-Education / Balance, Self Care / Home Evaluation, Stair Training, Therapeutic Activities, Therapeutic Exercise  Treatment Frequency: (P) Daily  Duration: (P) Until Therapy Goals Met    DC Equipment Recommendations: (P) Front-Wheel Walker  Discharge Recommendations: (P) Recommend post-acute placement for additional physical therapy services prior to discharge home (may be able to progress while in acute care setting and d/c home with HH therapy)     Objective       09/13/24 0920   Initial Contact Note    Initial Contact Note Order Received and Verified, Physical Therapy Evaluation in Progress with Full Report to Follow.   Precautions   Precautions Fall Risk;Anterior Hip Precautions;Weight Bearing As Tolerated Right Lower Extremity   Pain 0 - 10 Group   Location Hip   Location Orientation Right   Description Sore   Therapist Pain Assessment Prior to Activity;During Activity;Post Activity;Nurse Notified;5   Prior Living Situation   Prior Services None   Housing / Facility 1 Story Apartment / Condo   Steps Into Home 15   Steps In Home 0   Rail Both Rail (Steps into Home)   Equipment Owned Single Point Cane   Lives with - Patient's Self Care Capacity Alone and Unable to Care For Self   Comments pt states she feels uncomfortable and unsafe to d/c home alone with currently mobility, pt states she has a dtr in [facility], ,however, is working and kids unable to assist   Prior Level of Functional Mobility   Bed Mobility Independent   Transfer Status Independent   Ambulation Independent   Ambulation Distance   (community)   Assistive Devices Used Single Point Cane   Stairs Independent   Comments reports of an IPLOF   History of Falls   History of Falls No    Cognition    Cognition / Consciousness WDL   Level of Consciousness Alert   Comments pleasant/cooperative; poor insight into deficits   Passive ROM Upper Body   Passive ROM Upper Body WDL   Active ROM Upper Body   Active ROM Upper Body  WDL   Strength Upper Body   Upper Body Strength  WDL   Sensation Upper Body   Upper Extremity Sensation  WDL   Upper Body Muscle Tone   Upper Body Muscle Tone  WDL   Passive ROM Lower Body   Passive ROM Lower Body X   Comments limited in R hip due to pain and anterior hip precautions, otherwise able to demo functional PROM for B LE   Active ROM Lower Body    Active ROM Lower Body  X   Comments limited in R hip due to pain and due to anterior hip precautions, otherwise able to demo functional AROM for B LE   Strength Lower Body   Lower Body Strength  X   Comments limited in R hip due to pain, able to demo functional strength for B LE   Sensation Lower Body   Lower Extremity Sensation   WDL   Lower Body Muscle Tone   Lower Body Muscle Tone  WDL   Neurological Concerns   Neurological Concerns No   Coordination Upper Body   Coordination WDL   Coordination Lower Body    Coordination Lower Body  WDL   Vision   Vision Comments WNL   Balance Assessment   Sitting Balance (Static) Good   Sitting Balance (Dynamic) Fair +   Standing Balance (Static) Fair +   Standing Balance (Dynamic) Fair   Weight Shift Sitting Good   Weight Shift Standing Fair   Comments w/fww use, no talisha LOB noted   Bed Mobility    Supine to Sit Contact Guard Assist   Sit to Supine Contact Guard Assist   Scooting Standby Assist   Comments HOB elevated and rails up   Gait Analysis   Gait Level Of Assist Contact Guard Assist   Assistive Device Front Wheel Walker   Distance (Feet) 140   # of Times Distance was Traveled 1   Deviation Antalgic;Step To;Shuffled Gait   # of Stairs Climbed 5   Level of Assist with Stairs Contact Guard Assist   Weight Bearing Status WBAT R LE   Comments pt unable to ambulate back to room and  required transport chair   Functional Mobility   Sit to Stand Contact Guard Assist   Bed, Chair, Wheelchair Transfer Contact Guard Assist   Transfer Method Stand Step   Mobility sit<>stand, ambulation, steps, back to chair   6 Clicks Assessment - How much HELP from from another person do you currently need... (If the patient hasn't done an activity recently, how much help from another person do you think he/she would need if he/she tried?)   Turning from your back to your side while in a flat bed without using bedrails? 4   Moving from lying on your back to sitting on the side of a flat bed without using bedrails? 4   Moving to and from a bed to a chair (including a wheelchair)? 4   Standing up from a chair using your arms (e.g., wheelchair, or bedside chair)? 3   Walking in hospital room? 3   Climbing 3-5 steps with a railing? 3   6 clicks Mobility Score 21   Activity Tolerance   Sitting in Chair functional   Sitting Edge of Bed NT   Standing 10 mins   Comments no adverse events noted   Edema / Skin Assessment   Edema / Skin  X   Comments incision intact and dry   Patient / Family Goals    Patient / Family Goal #1 to go home safely   Short Term Goals    Short Term Goal # 1 pt will go supine<>sit w/hob flat and rails down w/Mod I in 6tx   Short Term Goal # 2 pt will go sit<>stand w/fww w/Mod I in 6tx   Short Term Goal # 3 pt will transfer bed<>chair w/fww w/Mod I in 6tx for safe d/c home   Short Term Goal # 4 pt will ambulate for 150ft w/fww w/Mod I in 6tx for safe d/c home   Short Term Goal # 5 pt will go up/down 15 steps with B railing w/Mod I in 6tx for safe entry into home   Education Group   Education Provided Role of Physical Therapist;Gait Training;Stair Training;Use of Assistive Device;Exercises - Supine;Exercises - Standing;Exercises - Seated;Hip Precautions Anterior   Hip Precautions Anterior Patient Response Patient;Acceptance;Explanation;Demonstration;Verbal Demonstration;Action Demonstration;Handout    Role of Physical Therapist Patient Response Patient;Acceptance;Explanation;Demonstration;Verbal Demonstration;Action Demonstration;Family   Gait Training Patient Response Patient;Acceptance;Explanation;Demonstration;Verbal Demonstration;Action Demonstration   Stair Training Patient Response Patient;Acceptance;Explanation;Demonstration;Verbal Demonstration;Action Demonstration   Use of Assistive Device Patient Response Patient;Acceptance;Explanation;Demonstration;Verbal Demonstration;Action Demonstration   Exercises - Supine Patient Response Acceptance;Explanation;Demonstration;Family;Patient;Handout;Verbal Demonstration;Action Demonstration   Exercises - Seated Patient Response Patient;Family;Acceptance;Explanation;Demonstration;Handout;Verbal Demonstration;Action Demonstration   Exercise - Standing Patient Response Patient;Family;Acceptance;Explanation;Handout;Demonstration;Verbal Demonstration;Action Demonstration   Physical Therapy Initial Treatment Plan    Treatment Plan  Bed Mobility;Equipment;Gait Training;Manual Therapy;Neuro Re-Education / Balance;Self Care / Home Evaluation;Stair Training;Therapeutic Activities;Therapeutic Exercise   Treatment Frequency Daily   Duration Until Therapy Goals Met   Problem List    Problems Pain;Impaired Bed Mobility;Impaired Transfers;Impaired Ambulation;Functional Strength Deficit;Impaired Balance;Decreased Activity Tolerance;Limited Knowledge of Post-Op Precautions   Anticipated Discharge Equipment and Recommendations   DC Equipment Recommendations Front-Wheel Walker   Discharge Recommendations Recommend post-acute placement for additional physical therapy services prior to discharge home  (may be able to progress while in acute care setting and d/c home with HH therapy)   Interdisciplinary Plan of Care Collaboration   IDT Collaboration with  Nursing;Family / Caregiver   Patient Position at End of Therapy Call Light within Reach;Phone within Reach;Tray Table within  Reach;In Bed;Bed Alarm On   Collaboration Comments aware of visit and recs   Session Information   Date / Session Number  9/13-1 (1/7, 9/19)

## 2024-09-13 NOTE — CARE PLAN
The patient is Stable - Low risk of patient condition declining or worsening    Shift Goals  Clinical Goals: PT/OT evaluations,  free from falls this shift  Patient Goals: rest and update on POC    Progress made toward(s) clinical / shift goals:  PT/OT evaluations in place, Calls appropriately for assistance out of bed.    Patient is not progressing towards the following goals: n/a

## 2024-09-13 NOTE — PROGRESS NOTES
1920 Received report from day shift RN. Patient is awake and alert, resting in bed. A&O X4, room air, on 1 L oxygen via nasal cannula. Unlabored respiration observed. Surgical FLORY dressing to right hip clean, dry and intact. SCD is on, polar ice in place. Pulse Ox in place. Care plan discussed including ambulating and pain management. Call light within reach. Personal belongings within reach. No needs required at this time. Safety precautions in place.     0250 Pt is asymptomatic on low pulse 42-58. Other vital signs within normal range. Heart rate tends to be on low range during the sleep.

## 2024-09-14 PROCEDURE — 97535 SELF CARE MNGMENT TRAINING: CPT

## 2024-09-14 PROCEDURE — 97116 GAIT TRAINING THERAPY: CPT

## 2024-09-14 PROCEDURE — A9270 NON-COVERED ITEM OR SERVICE: HCPCS | Performed by: ORTHOPAEDIC SURGERY

## 2024-09-14 PROCEDURE — 97110 THERAPEUTIC EXERCISES: CPT

## 2024-09-14 PROCEDURE — 99024 POSTOP FOLLOW-UP VISIT: CPT | Performed by: PHYSICIAN ASSISTANT

## 2024-09-14 PROCEDURE — G0378 HOSPITAL OBSERVATION PER HR: HCPCS

## 2024-09-14 PROCEDURE — 94760 N-INVAS EAR/PLS OXIMETRY 1: CPT

## 2024-09-14 PROCEDURE — 700102 HCHG RX REV CODE 250 W/ 637 OVERRIDE(OP): Performed by: ORTHOPAEDIC SURGERY

## 2024-09-14 PROCEDURE — 97530 THERAPEUTIC ACTIVITIES: CPT

## 2024-09-14 PROCEDURE — 97112 NEUROMUSCULAR REEDUCATION: CPT

## 2024-09-14 RX ORDER — ONDANSETRON 4 MG/1
4 TABLET, ORALLY DISINTEGRATING ORAL EVERY 6 HOURS PRN
Qty: 10 TABLET | Refills: 0
Start: 2024-09-14

## 2024-09-14 RX ORDER — PSEUDOEPHEDRINE HCL 30 MG
100 TABLET ORAL 3 TIMES DAILY PRN
Qty: 90 CAPSULE | Refills: 0
Start: 2024-09-14

## 2024-09-14 RX ORDER — TRAMADOL HYDROCHLORIDE 50 MG/1
50 TABLET ORAL EVERY 6 HOURS PRN
Qty: 20 TABLET | Refills: 0
Start: 2024-09-14 | End: 2024-09-24

## 2024-09-14 RX ORDER — ASPIRIN 81 MG/1
81 TABLET ORAL 2 TIMES DAILY
Qty: 60 TABLET | Refills: 0
Start: 2024-09-14

## 2024-09-14 RX ORDER — OXYCODONE HYDROCHLORIDE 5 MG/1
5 TABLET ORAL EVERY 4 HOURS PRN
Qty: 20 TABLET | Refills: 0
Start: 2024-09-14 | End: 2024-09-19

## 2024-09-14 RX ADMIN — ACETAMINOPHEN 650 MG: 325 TABLET ORAL at 05:03

## 2024-09-14 RX ADMIN — ACETAMINOPHEN 650 MG: 325 TABLET ORAL at 18:41

## 2024-09-14 RX ADMIN — OXYCODONE HYDROCHLORIDE 5 MG: 5 TABLET ORAL at 11:51

## 2024-09-14 RX ADMIN — DOCUSATE SODIUM 100 MG: 100 CAPSULE, LIQUID FILLED ORAL at 18:42

## 2024-09-14 RX ADMIN — ACETAMINOPHEN 650 MG: 325 TABLET ORAL at 23:42

## 2024-09-14 RX ADMIN — SENNOSIDES AND DOCUSATE SODIUM 1 TABLET: 50; 8.6 TABLET ORAL at 18:42

## 2024-09-14 RX ADMIN — ACETAMINOPHEN 650 MG: 325 TABLET ORAL at 11:51

## 2024-09-14 RX ADMIN — ASPIRIN 81 MG: 81 TABLET, COATED ORAL at 05:03

## 2024-09-14 RX ADMIN — ASPIRIN 81 MG: 81 TABLET, COATED ORAL at 18:42

## 2024-09-14 RX ADMIN — OXYCODONE HYDROCHLORIDE 5 MG: 5 TABLET ORAL at 18:43

## 2024-09-14 RX ADMIN — DOCUSATE SODIUM 100 MG: 100 CAPSULE, LIQUID FILLED ORAL at 05:03

## 2024-09-14 RX ADMIN — SENNOSIDES AND DOCUSATE SODIUM 1 TABLET: 50; 8.6 TABLET ORAL at 21:38

## 2024-09-14 ASSESSMENT — PAIN DESCRIPTION - PAIN TYPE
TYPE: ACUTE PAIN
TYPE: SURGICAL PAIN
TYPE: SURGICAL PAIN

## 2024-09-14 ASSESSMENT — GAIT ASSESSMENTS
ASSISTIVE DEVICE: FRONT WHEEL WALKER
DEVIATION: STEP TO
DISTANCE (FEET): 100
GAIT LEVEL OF ASSIST: SUPERVISED

## 2024-09-14 ASSESSMENT — COGNITIVE AND FUNCTIONAL STATUS - GENERAL
HELP NEEDED FOR BATHING: A LITTLE
DAILY ACTIVITIY SCORE: 22
SUGGESTED CMS G CODE MODIFIER DAILY ACTIVITY: CJ
DRESSING REGULAR LOWER BODY CLOTHING: A LITTLE

## 2024-09-14 NOTE — PROGRESS NOTES
Assumed care of pt at 1915, bedside report. Pt is AAOx 4, resting comfortably in bed with NADN.    Pt using call light appropriately and to get up with assistance. Discussed plan of care for the night with patient, bed in lowest position, call light in reach, personal belongings in reach. No complaints at this time.

## 2024-09-14 NOTE — THERAPY
Physical Therapy   Daily Treatment     Patient Name: Yola Campuzano  Age:  64 y.o., Sex:  female  Medical Record #: [id number]  Today's Date: 9/14/2024     Precautions  Precautions: Anterior Hip Precautions;Weight Bearing As Tolerated Right Lower Extremity;Fall Risk    Assessment    Pt is improving with transfers,ambulation and stairs.She is having difficulty with bed mobility.    Plan    Treatment Plan Status:    Type of Treatment: Bed Mobility, Equipment, Gait Training, Manual Therapy, Neuro Re-Education / Balance, Self Care / Home Evaluation, Stair Training, Therapeutic Activities, Therapeutic Exercise  Treatment Frequency: Daily  Treatment Duration: Until Therapy Goals Met    DC Equipment Recommendations: Front-Wheel Walker  Discharge Recommendations: Recommend post-acute placement for additional physical therapy services prior to discharge home (may be able to progress while in acute care setting and d/c home with HH therapy)       Objective       09/14/24 0900   Charge Group   PT Gait Training (Units) 1   PT Therapeutic Activities (Units) 1   PT Therapeutic Exercise (Units) 1   Supine Lower Body Exercise   Supine Lower Body Exercises Yes   Sitting Lower Body Exercises   Sitting Lower Body Exercises Yes   Balance   Sitting Balance (Static) Good   Sitting Balance (Dynamic) Good   Standing Balance (Static) Fair +   Standing Balance (Dynamic) Fair   Weight Shift Sitting Good   Weight Shift Standing Good   Bed Mobility    Supine to Sit Minimal Assist   Sit to Supine Minimal Assist   Scooting Modified Independent   Gait Analysis   Gait Level Of Assist Supervised   Assistive Device Front Wheel Walker   Distance (Feet) 100   # of Times Distance was Traveled 2   Deviation Step To   # of Stairs Climbed 10   Level of Assist with Stairs Supervised   Functional Mobility   Sit to Stand Supervised   Bed, Chair, Wheelchair Transfer Supervised   Transfer Method Stand Step   Short Term Goals    Short Term Goal # 1 pt will go  supine<>sit w/hob flat and rails down w/Mod I in 6tx   Goal Outcome # 1 Progressing as expected   Short Term Goal # 2 pt will go sit<>stand w/fww w/Mod I in 6tx   Goal Outcome # 2 Progressing as expected   Short Term Goal # 3 pt will transfer bed<>chair w/fww w/Mod I in 6tx for safe d/c home   Goal Outcome # 3 Progressing as expected   Short Term Goal # 4 pt will ambulate for 150ft w/fww w/Mod I in 6tx for safe d/c home   Goal Outcome # 4 Progressing as expected   Short Term Goal # 5 pt will go up/down 15 steps with B railing w/Mod I in 6tx for safe entry into home   Goal Outcome # 5 Progressing as expected   Interdisciplinary Plan of Care Collaboration   IDT Collaboration with  Nursing   Session Information   Date / Session Number  9/14-2 2/7 9/19

## 2024-09-14 NOTE — PROGRESS NOTES
Received report from night shift nurse. Assumed pt care at 0715. Pt is A&Ox4,resting comfortably in bed w/no complaints of pain. Pt on RA. No signs of SOB/respiratory distress. Labs, VS, medications reviewed.  Fall precautions in place. Bed at lowest position. Call light and personal belongings within reach. Plan of care discussed, no further concerns at this time.

## 2024-09-14 NOTE — THERAPY
Occupational Therapy  Daily Treatment     Patient Name: Yola Campuzano  Age:  64 y.o., Sex:  female  Medical Record #: [id number]  Today's Date: 9/14/2024     Precautions  Precautions: Anterior Hip Precautions, Weight Bearing As Tolerated Right Lower Extremity, Fall Risk    Assessment  Pt seen for OT treatment. Pt is eager for OOB activity, progressing towards goals. Limited by R hip pain, decreased functional mobility. See below for CLOF. Lives alone, will benefit from post acute inpt rehab prior to return home. OT to follow.     Plan  Treatment Plan Status: (P) Continue Current Treatment Plan  Type of Treatment: (P) Self Care / Activities of Daily Living, Neuro Re-Education / Balance, Therapeutic Activity  Treatment Frequency: (P) 3 Times per Week  Treatment Duration: Until Therapy Goals Met    DC Equipment Recommendations: (P) Unable to determine at this time  Discharge Recommendations: (P) Recommend post-acute placement for additional occupational therapy services prior to discharge home    Subjective  Agreeable     Objective     09/14/24 1002   Pain 0 - 10 Group   Location Hip   Location Orientation Right   Therapist Pain Assessment Post Activity Pain Same as Prior to Activity;Nurse Notified   Cognition    Cognition / Consciousness WDL   Level of Consciousness Alert   Balance   Sitting Balance (Static) Good   Sitting Balance (Dynamic) Good   Standing Balance (Static) Fair +   Standing Balance (Dynamic) Fair   Weight Shift Sitting Good   Weight Shift Standing Good   Skilled Intervention Verbal Cuing   Bed Mobility    Supine to Sit Minimal Assist   Activities of Daily Living   Eating Modified Independent   Grooming Standby Assist;Standing   Lower Body Dressing Minimal Assist   Toileting Standby Assist   Skilled Intervention Verbal Cuing;Compensatory Strategies   How much help from another person does the patient currently need...   Putting on and taking off regular lower body clothing? 3   Bathing (including  washing, rinsing, and drying)? 3   Toileting, which includes using a toilet, bedpan, or urinal? 4   Putting on and taking off regular upper body clothing? 4   Taking care of personal grooming such as brushing teeth? 4   Eating meals? 4   6 Clicks Daily Activity Score 22   Functional Mobility   Sit to Stand Supervised   Bed, Chair, Wheelchair Transfer Standby Assist   Toilet Transfers Standby Assist   Transfer Method Stand Step   Comments fww   Activity Tolerance   Sitting in Chair post session   Sitting Edge of Bed 12   Standing 11   Short Term Goals   Goal Outcome # 1 Progressing as expected   Goal Outcome # 2 Progressing as expected   Goal Outcome # 3 Progressing as expected   Education Group   Role of Occupational Therapist Patient Response Patient;Acceptance;Explanation;Verbal Demonstration   Hip Precautions Patient Response Patient;Acceptance;Explanation;Verbal Demonstration   Home Safety Patient Response Patient;Acceptance;Explanation;Verbal Demonstration   Transfers Patient Response Patient;Acceptance;Explanation;Verbal Demonstration   ADL Patient Response Patient;Acceptance;Explanation;Verbal Demonstration   Occupational Therapy Treatment Plan    O.T. Treatment Plan Continue Current Treatment Plan   Treatment Interventions Self Care / Activities of Daily Living;Neuro Re-Education / Balance;Therapeutic Activity   Treatment Frequency 3 Times per Week   Anticipated Discharge Equipment and Recommendations   DC Equipment Recommendations Unable to determine at this time   Discharge Recommendations Recommend post-acute placement for additional occupational therapy services prior to discharge home   Interdisciplinary Plan of Care Collaboration   IDT Collaboration with  Nursing;Physician Assistant   Patient Position at End of Therapy Seated;Call Light within Reach;Tray Table within Reach;Phone within Reach   Collaboration Comments aware of visit. dc planning.

## 2024-09-14 NOTE — DISCHARGE PLANNING
.[facility] Transition Team Assessment    Met with pt at bedside to complete dc planning assessment.   Verified information listed on facesheet.   Pt reports she lives alone in a single story home. Prior to admission pt was using a cane for ambulation. Pt was independent with all self care. Pt uses the bus to get to appointments. Pt is established with her PCP. Pt states she has good support through her daughter and daughters boyfriend.    Discussed recommendation for SNF placement and went over accepting facilities.   Pt states her first choice is [facility] since her daughter works in [employer].   VM left for Maryellen @ [facility] requesting they start insurance auth.     Choice form completed and faxed to [facility]    Information Source  Orientation Level: Oriented X4, Appropriate for developmental age  Information Given By: Patient    Elopement Risk  Legal Hold: No  Ambulatory or Self Mobile in Wheelchair: No-Not an Elopement Risk  Disoriented: No  Psychiatric Symptoms: None  History of Wandering: No  Elopement this Admit: No  Vocalizing Wanting to Leave: No  Displays Behaviors, Body Language Wanting to Leave: No-Not at Risk for Elopement  Elopement Risk: Not at Risk for Elopement    Interdisciplinary Discharge Planning  Lives with - Patient's Self Care Capacity: Alone and Able to Care For Self  Patient or legal guardian wants to designate a caregiver: No  Support Systems: Children  Housing / Facility: [address] [facility]  Prior Services: None    Discharge Preparedness  What is your plan after discharge?: Skilled nursing facility  What are your discharge supports?: Child  Prior Functional Level: Ambulatory, Independent with Activities of Daily Living, Independent with Medication Management, Uses Cane  Difficulity with ADLs: None  Difficulity with IADLs: None    Functional Assesment  Prior Functional Level: Ambulatory, Independent with Activities of Daily Living, Independent with Medication Management, Uses Cane    Finances  Financial  Barriers to Discharge: No  Prescription Coverage: Yes    Vision / Hearing Impairment  Vision Impairment : No  Right Eye Vision: Impaired, Wears Glasses  Left Eye Vision: Impaired, Wears Glasses  Hearing Impairment : No    Domestic Abuse  Possible Abuse/Neglect Reported to::     Psychological Assessment  History of Substance Abuse: None  History of Psychiatric Problems: No  Non-compliant with Treatment: No  Newly Diagnosed Illness: Yes    Discharge Risks or Barriers  Discharge risks or barriers?: No    Anticipated Discharge Information  Discharge Disposition: D/T to SNF with Medicare cert in anticipation of skilled care (03)

## 2024-09-14 NOTE — DISCHARGE SUMMARY
Addendum 9/16/24-no changes    Addendum 9/15/24- no changes.     Yola Campuzano was admitted on 9/12/2024 for Primary osteoarthritis of right hip [M16.11]  Patient was diagnosed with severe degenerative arthritis in the right hip and underwent a right total hip arthroplasty by Dr. Ramesh Pretty on the date of admission. Please see dictated operative note for further information.    Hospital course: Patient did well postoperatively.  Patient lives alone and has stairs and has been a bit slow to ambulate.  PT and OT evaluated the patient and recommended postacute rehab versus skilled nursing.  Otherwise patient's pain is controlled and she is doing well.    The patient has done well, with no complications.  Patient denies chest pain, calf pain or shortness of breath.   Pain is well-controlled at present.  Patient is ambulating well with the use of an assistive device, and progressing in physical therapy.   See progress notes from this admission for patient's neurovascular status  Narcotic dosages were chosen by taking into account the the patient's previous history of opoid use and to ensure proper pain control after surgery    Discharge date: 9/15/2024    Patient is being discharged to rehab  after am physical therapy.     Allergies:  Albuterol       Medication List        START taking these medications        Instructions   aspirin 81 MG EC tablet   Take 1 Tablet by mouth 2 times a day.  Dose: 81 mg     docusate sodium 100 MG Caps   Take 100 mg by mouth 3 times a day as needed for Constipation.  Dose: 100 mg     ondansetron 4 MG Tbdp  Commonly known as: Zofran ODT   Take 1 Tablet by mouth every 6 hours as needed for Nausea/Vomiting.  Dose: 4 mg     oxyCODONE immediate-release 5 MG Tabs  Commonly known as: Roxicodone   Take 1 Tablet by mouth every four hours as needed for Severe Pain for up to 5 days.  Dose: 5 mg     traMADol 50 MG Tabs  Commonly known as: Ultram   Take 1 Tablet by mouth every 6 hours as needed  for Moderate Pain for up to 10 days.  Dose: 50 mg            CHANGE how you take these medications        Instructions   Acetaminophen Extra Strength 500 MG Tabs  What changed: additional instructions   Take 1-2 Tablets by mouth every 6 hours as needed for Moderate Pain.  Dose: 500-1,000 mg     terbinafine 250 MG Tabs  What changed: additional instructions  Commonly known as: LamISIL   Take 1 Tablet by mouth every day.  Dose: 250 mg     vitamin D2 (Ergocalciferol) 1.25 MG (91626 UT) Caps capsule  What changed: additional instructions  Commonly known as: Drisdol   Take 1 Capsule by mouth every 14 days.  Dose: 50,000 Units            STOP taking these medications      ibuprofen 600 MG Tabs  Commonly known as: Motrin     nicotine polacrilex 2 MG Gum  Commonly known as: Nicorette              Discharge Instructions:     Patient is instructed to ambulate and weight bear as tolerated with the use of an assistive device, and to continue physical therapy exercises given during this hospital stay.   Patient is to ice and elevate the surgical leg regularly, with pillows under the ankle, nothing is to be placed under the knee.   Patient was given detailed wound care instructions, and will leave the Incisional vac or silver dressing on until first post-op visit.   Aspirin 81 mg twice daily for DVT prophylaxis.  Patient is to follow up with Dr. Pretty's's office in 1-2 weeks.

## 2024-09-14 NOTE — CARE PLAN
The patient is Watcher - Medium risk of patient condition declining or worsening    Shift Goals  Clinical Goals: Pt without falls and injury; PT/OT eval and rec completed; Up to chair for meals.  Patient Goals: Go for walk; Do exercises given by PT & OT.    Progress made toward(s) clinical / shift goals:  Pt without falls and injury; PT/OT eval and rec completed; Up to chair for meals.    Patient is not progressing towards the following goals:

## 2024-09-14 NOTE — PROGRESS NOTES
Ortho progress note    S:  s/p right total hip arthroplasty-postoperative day #2    Patient is doing well.  Pain is controlled.  No fevers or chills.  Ambulating with PT/OT.  They recommended rehab.  No chest pain, headache, shortness of breath.  Voiding and tolerating regular diet.    All vitals are stable    Exam:    NAD A& O x3  Breathing is nonlabored  RLE: +DF/PF/EHL SILT L4/L5/S1, soft calf compartments and thigh compartments.  Siri dressing over right hip with good suction and no output.  LLE:  +DF/PF/EHL SILT L4/L5/S1, soft calf compartments    Impression    1) status post right total hip arthroplasty-postoperative day #2    Plan:  Continue standard plan of care  Weight bearing: as tolerated with walker/cane.  PT/OT while in house discharge planning and case management to place patient in a rehab facility  DVT prophylaxis: Aspirin 81 mg twice daily  Dispo: To rehab once placement can be achieved.  Discharge summary has been done and orders have been placed.  Hard written prescriptions for pain medication are in the chart

## 2024-09-14 NOTE — CARE PLAN
The patient is Stable - Low risk of patient condition declining or worsening    Shift Goals  Clinical Goals: no falls, manage pain  Patient Goals: rest    Progress made toward(s) clinical / shift goals:  pt sustained no falls. Pain managed as per MAR.    Patient is not progressing towards the following goals:

## 2024-09-15 PROCEDURE — A9270 NON-COVERED ITEM OR SERVICE: HCPCS | Performed by: ORTHOPAEDIC SURGERY

## 2024-09-15 PROCEDURE — 99024 POSTOP FOLLOW-UP VISIT: CPT | Performed by: PHYSICIAN ASSISTANT

## 2024-09-15 PROCEDURE — G0378 HOSPITAL OBSERVATION PER HR: HCPCS

## 2024-09-15 PROCEDURE — 94760 N-INVAS EAR/PLS OXIMETRY 1: CPT

## 2024-09-15 PROCEDURE — 700102 HCHG RX REV CODE 250 W/ 637 OVERRIDE(OP): Performed by: ORTHOPAEDIC SURGERY

## 2024-09-15 RX ADMIN — ASPIRIN 81 MG: 81 TABLET, COATED ORAL at 17:49

## 2024-09-15 RX ADMIN — POLYETHYLENE GLYCOL 3350 1 PACKET: 17 POWDER, FOR SOLUTION ORAL at 06:37

## 2024-09-15 RX ADMIN — ACETAMINOPHEN 650 MG: 325 TABLET ORAL at 13:20

## 2024-09-15 RX ADMIN — DOCUSATE SODIUM 100 MG: 100 CAPSULE, LIQUID FILLED ORAL at 17:49

## 2024-09-15 RX ADMIN — ACETAMINOPHEN 650 MG: 325 TABLET ORAL at 23:44

## 2024-09-15 RX ADMIN — ACETAMINOPHEN 650 MG: 325 TABLET ORAL at 06:30

## 2024-09-15 RX ADMIN — ASPIRIN 81 MG: 81 TABLET, COATED ORAL at 06:30

## 2024-09-15 RX ADMIN — ACETAMINOPHEN 650 MG: 325 TABLET ORAL at 17:50

## 2024-09-15 RX ADMIN — DOCUSATE SODIUM 100 MG: 100 CAPSULE, LIQUID FILLED ORAL at 06:31

## 2024-09-15 RX ADMIN — SENNOSIDES AND DOCUSATE SODIUM 1 TABLET: 50; 8.6 TABLET ORAL at 20:50

## 2024-09-15 ASSESSMENT — PAIN DESCRIPTION - PAIN TYPE: TYPE: SURGICAL PAIN

## 2024-09-15 ASSESSMENT — PATIENT HEALTH QUESTIONNAIRE - PHQ9
1. LITTLE INTEREST OR PLEASURE IN DOING THINGS: NOT AT ALL
2. FEELING DOWN, DEPRESSED, IRRITABLE, OR HOPELESS: NOT AT ALL
SUM OF ALL RESPONSES TO PHQ9 QUESTIONS 1 AND 2: 0

## 2024-09-15 NOTE — PROGRESS NOTES
1920 Received report from day shift RN. Patient is awake and alert, resting in bed. A&O X4, room air. Unlabored respiration Observed. Surgical Siri dressing to  right hip clean, dry and intact. Gorge hose in place. SCD In place. Care plan discussed . Call light within reach. Personal belongings within reach. No needs required at this time. Safety precautions in place.

## 2024-09-15 NOTE — PROGRESS NOTES
"Patient seen and examined  Doing ok.  Feels a little weak today. Pain tolerable.     /66   Pulse (!) 59   Temp 36.6 °C (97.9 °F) (Temporal)   Resp 16   Ht 1.626 m (5' 4.02\")   Wt 71.3 kg (157 lb 3 oz)   SpO2 96%     No acute distress  Dressing clean dry and intact  Neurovascularly intact RLE    POD#3 R MIKHAIL      Plan:  DVT Prophylaxis- TEDS/SCDs, ASA 81 mg PO BID   Weight Bearing Status-as tolerable  PT/OT while in house discharge planning and case management to place patient in a rehab facility/SNF    Cleared for d/c when placement available.         "

## 2024-09-15 NOTE — CARE PLAN
The patient is Stable - Low risk of patient condition declining or worsening    Shift Goals  Clinical Goals: Pt without falls and injury; CMS intact; Pt up to chair for meals.  Patient Goals: Rest; Do exercises.    Progress made toward(s) clinical / shift goals:  Pt without falls and injury; CMS intact; Pt up to chair for meals.    Patient is not progressing towards the following goals:

## 2024-09-15 NOTE — CARE PLAN
The patient is Stable - Low risk of patient condition declining or worsening    Shift Goals  Clinical Goals: Zero fall.Monitor V/S  and lab.  Patient Goals: rest and sleep    Progress made toward(s) clinical / shift goals:  VSS. No critical lab reported. Pt had zero fall during the shift. Pt seen sleeping during the rounds.     Patient is not progressing towards the following goals: N/A

## 2024-09-16 ENCOUNTER — PATIENT OUTREACH (OUTPATIENT)
Dept: SCHEDULING | Facility: IMAGING CENTER | Age: 64
End: 2024-09-16
Payer: MEDICAID

## 2024-09-16 VITALS
HEART RATE: 75 BPM | DIASTOLIC BLOOD PRESSURE: 63 MMHG | RESPIRATION RATE: 18 BRPM | BODY MASS INDEX: 26.84 KG/M2 | OXYGEN SATURATION: 94 % | TEMPERATURE: 98.4 F | SYSTOLIC BLOOD PRESSURE: 131 MMHG | WEIGHT: 157.19 LBS | HEIGHT: 64 IN

## 2024-09-16 PROCEDURE — A9270 NON-COVERED ITEM OR SERVICE: HCPCS | Performed by: ORTHOPAEDIC SURGERY

## 2024-09-16 PROCEDURE — G0378 HOSPITAL OBSERVATION PER HR: HCPCS

## 2024-09-16 PROCEDURE — 700102 HCHG RX REV CODE 250 W/ 637 OVERRIDE(OP): Performed by: ORTHOPAEDIC SURGERY

## 2024-09-16 RX ADMIN — ASPIRIN 81 MG: 81 TABLET, COATED ORAL at 04:32

## 2024-09-16 RX ADMIN — OXYCODONE HYDROCHLORIDE 5 MG: 5 TABLET ORAL at 02:58

## 2024-09-16 RX ADMIN — DOCUSATE SODIUM 100 MG: 100 CAPSULE, LIQUID FILLED ORAL at 04:32

## 2024-09-16 RX ADMIN — BISACODYL 10 MG: 10 SUPPOSITORY RECTAL at 12:14

## 2024-09-16 RX ADMIN — ACETAMINOPHEN 650 MG: 325 TABLET ORAL at 12:14

## 2024-09-16 RX ADMIN — ACETAMINOPHEN 650 MG: 325 TABLET ORAL at 04:32

## 2024-09-16 RX ADMIN — MAGNESIUM HYDROXIDE 30 ML: 1200 LIQUID ORAL at 08:34

## 2024-09-16 ASSESSMENT — PATIENT HEALTH QUESTIONNAIRE - PHQ9
SUM OF ALL RESPONSES TO PHQ9 QUESTIONS 1 AND 2: 0
1. LITTLE INTEREST OR PLEASURE IN DOING THINGS: NOT AT ALL

## 2024-09-16 ASSESSMENT — PAIN DESCRIPTION - PAIN TYPE: TYPE: SURGICAL PAIN

## 2024-09-16 NOTE — PROGRESS NOTES
Discharged per MD order in a stable condition. Discussed discharge summary, medication list and follow up with pt, verbalized understanding. Removed IV. All belongings accounted for. Report given to [facility] Kelsi NEGRON. COBRA and hard script order handed to transporter. Transported via  with an escort.

## 2024-09-16 NOTE — PROGRESS NOTES
BSSR received from night RN. Pt sitting up in bed eating breakfast not in any distress on RA at 92%. AAOx4. Denies any pain or nausea. R hip FLORY dressing in place, CDI. CMS noted. Discussed POC. Fall risk precautions in place, locked bed in lowest position, bed alarm on and call light within reach. All needs met at this time. Hourly rounding in place.

## 2024-09-16 NOTE — CARE PLAN
The patient is Stable - Low risk of patient condition declining or worsening    Shift Goals  Clinical Goals: Pt's pain will remain controlled, BM and free from falls during this shift  Patient Goals: Able to manage pain, rest comfortably and dc  Family Goals: n/a    Progress made toward(s) clinical / shift goals:  Pt reported pain controlled. Had BM today. Tolerated diet, denies nausea. Ambulated bathroom. Pt did not sustain any fall during this shift.     Patient is not progressing towards the following goals:

## 2024-09-16 NOTE — DISCHARGE PLANNING
DC Transport Scheduled    Transport Company Scheduled:  [employer]  Spoke with Sherry at [facility] to schedule transport.  [facility] Trip #: [id number]    Scheduled Date: 9/16/2024  Scheduled Time: 1600  [facility]   [address]    Transport Type: Wheelchair  Destination: [facility] at [address] [place]     Notified care team of scheduled transport via Voalte.     If there are any changes needed to the DC transportation scheduled, please contact [facility] at ext. [phone] between the hours of 4078-4363 Mon-Fri. If outside those hours, contact the ED Case Manager at ext. [phone].

## 2024-09-16 NOTE — CARE PLAN
Problem: Knowledge Deficit - Standard  Goal: Patient and family/care givers will demonstrate understanding of plan of care, disease process/condition, diagnostic tests and medications  Outcome: Progressing     Problem: Post-Operative Hip Replacement  Goal: Patient's neurovascular status will be maintained or improve  Outcome: Progressing     Problem: Venous Thromboembolism (VTE) Prevention  Goal: The patient will remain free from venous thromboembolism (VTE)  Outcome: Progressing   The patient is Stable - Low risk of patient condition declining or worsening    Shift Goals  Clinical Goals: pain control,monitor S/sx of infection  Patient Goals: rest    Progress made toward(s) clinical / shift goals:  pain alleviated with scheduled pain medication,VSS,No falls sustained.    Patient is not progressing towards the following goals:

## 2024-09-16 NOTE — DISCHARGE PLANNING
Case Management Discharge Planning    Admission Date: 9/12/2024  GMLOS:    ALOS: 0    6-Clicks ADL Score: 22  6-Clicks Mobility Score: 21      Anticipated Discharge Dispo: Discharge Disposition: D/T to SNF with Medicare cert in anticipation of skilled care (03)    DME Needed: No    Action(s) Taken: Updated Provider/Nurse on Discharge Plan, Transport Arranged , and Authorization Received     1000-  LSW requested for DPA to follow up on auth pending with [facility].     1109-  DPA reported [facility] had received auth.     Spoke with Maryellen from [facility], they can take pt at 1600.   R[place] order completed requesting pt to be scheduled transport to [facility] at 1600.     1300-  C[place]ra completed, met with pt at bedside to notify. Obtained signature from pt. Pt is agreeable to discharge to [facility] today.     1400-  Transfer packet completed, provided to RN.    Escalations Completed: None    Medically Clear: Yes    Next Steps: LSW to follow    Barriers to Discharge: None    Is the patient up for discharge tomorrow: No

## 2024-09-16 NOTE — DISCHARGE PLANNING
SIMIN received call from Maryellen [facility] [place] stating she has insurance auth approval and can take Pt today.  Will reach out to JOE VIZCARRA to inform.

## 2024-10-21 DIAGNOSIS — M54.50 CHRONIC BILATERAL LOW BACK PAIN, UNSPECIFIED WHETHER SCIATICA PRESENT: ICD-10-CM

## 2024-10-21 DIAGNOSIS — G89.29 CHRONIC BILATERAL LOW BACK PAIN, UNSPECIFIED WHETHER SCIATICA PRESENT: ICD-10-CM

## 2024-10-22 PROCEDURE — RXMED WILLOW AMBULATORY MEDICATION CHARGE: Performed by: INTERNAL MEDICINE

## 2024-10-22 RX ORDER — ACETAMINOPHEN 500 MG
500-1000 TABLET ORAL EVERY 6 HOURS PRN
Qty: 60 TABLET | Refills: 5 | Status: SHIPPED | OUTPATIENT
Start: 2024-10-22

## 2024-10-23 ENCOUNTER — PHARMACY VISIT (OUTPATIENT)
Dept: PHARMACY | Facility: MEDICAL CENTER | Age: 64
End: 2024-10-23
Payer: COMMERCIAL

## 2024-11-19 ENCOUNTER — PHYSICAL THERAPY (OUTPATIENT)
Dept: PHYSICAL THERAPY | Facility: REHABILITATION | Age: 64
End: 2024-11-19
Attending: ORTHOPAEDIC SURGERY
Payer: MEDICAID

## 2024-11-19 DIAGNOSIS — Z47.1 AFTERCARE FOLLOWING RIGHT HIP JOINT REPLACEMENT SURGERY: ICD-10-CM

## 2024-11-19 DIAGNOSIS — Z96.641 AFTERCARE FOLLOWING RIGHT HIP JOINT REPLACEMENT SURGERY: ICD-10-CM

## 2024-11-19 PROCEDURE — 999999 HB NO CHARGE

## 2024-11-19 PROCEDURE — 97162 PT EVAL MOD COMPLEX 30 MIN: CPT

## 2024-11-19 ASSESSMENT — ENCOUNTER SYMPTOMS
PAIN SCALE AT LOWEST: 0
PAIN SCALE AT HIGHEST: 5
PAIN SCALE: 0

## 2024-11-20 NOTE — OP THERAPY EVALUATION
Outpatient Physical Therapy  INITIAL EVALUATION    [facility] Physical [address]  [address]  [place] [address]  Phone:  [phone]  Fax:  [phone]    Date of Evaluation: 2024    Patient: Yola Campuzano  YOB: 1960  MRN: [id number]     Referring Provider: Ramesh Pretty M.D.  [address] [place] [address]   Referring Diagnosis No admission diagnoses are documented for this encounter.     Time Calculation  Start time: 1630  Stop time: 1715 Time Calculation (min): 45 minutes         Chief Complaint: Hip Problem    Visit Diagnoses     ICD-10-CM   1. Aftercare following right hip joint replacement surgery  Z47.1    Z96.641       Date of onset of impairment: 2024    Subjective:   History of Present Illness:     Mechanism of injury:  Pt is a 63 yo female presenting to PT s/p R anterior MIKHAIL performed 24. Pt reports going to [facility] until 10/16/24, then no PT since then. Has not been completing HEP. Is currently using 2ww for all mobility but was using SPC prior to surgery. Reports having some continued R hip pain, but feels like it's because activity levels have been low. Reports overall decreased strength and mobility. Has difficulty putting on socks and shoes. Is currently using a shower chair for bathing. Has not been able to cook for self, instead is preparing microwave meals. Denies falls or near falls in the last year. PMI also includes L shoulder pain that has limited functional mobility.   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    Hypertension     Osteopenia 08/29/2012    Substance abuse (HCC)     Tobacco dependence 08/14/2012    Tuberculosis     was dx in a rehab ceneter in 2006    Urinary tract infection, site not specified     Vitamin d deficiency 09/25/2012     Past Surgical History:   Procedure Laterality Date    HIP ARTH ANTERIOR TOTAL Right 9/12/2024    Procedure: RIGHT ANTERIOR TOTAL HIP ARTHROPLASTY;  Surgeon: Ramesh Pretty M.D.;  Location: SURGERY [facility];  Service: Orthopedics    OPEN REDUCTION  1991    left ankle     Social History     Tobacco Use    Smoking status: Every Day     Current packs/day: 0.50     Average packs/day: 0.5 packs/day for 45.0 years (22.5 ttl pk-yrs)     Types: Cigarettes    Smokeless tobacco: Never    Tobacco comments:     previous 1 ppd for many years   Substance Use Topics    Alcohol use: Not Currently     Comment: previous heavy alchol use, 1/2 gallon hard alcohol daily or 12 beers for 10 years, cut back years ago     Family and Occupational History     Socioeconomic History    Marital status: Single     Spouse name: Not on file    Number of children: Not on file    Years of education: Not on file    Highest education level: Some college, no degree   Occupational History    Not on file       Objective     Active Range of Motion     Right Hip   Normal active range of motion    Passive Range of Motion     Right Hip   Normal passive range of motion    Strength:      Left Hip   Planes of Motion   Flexion: 4+  Abduction: 4    Right Hip   Planes of Motion   Flexion: 4-  Abduction: 3+    Additional Strength Details  Sit<>stand: Demo increased hip flexion quad avoidant strategy  30 second STS: 6 reps  5xSTS: 24 seconds  Ambulation     Quality of Movement During Gait     Additional Quality of Movement During Gait Details  Arias NAVARRO  Trendelenburg, increased fwd trunk flexion    Functional Assessment     Comments  TUG w/ 2ww: 23.5 seconds (average of 2 trials)  TUG w/ SPC: 22.0 seconds (average of 2 trials)  Gait speed: 0.53 m/s w/ 2ww        Therapeutic Exercises (CPT 21600):       Therapeutic Exercise Summary: Access Code: [email or web address]  URL: [email or web address]  Date: 11/19/2024  Prepared by: Ramon Muller    Exercises  - Sit to Stand Without Arm Support  - 1 x daily - 7 x weekly - 3 sets - 10 reps  - Standing Hip Extension with Counter Support  - 1 x daily - 7 x weekly - 3 sets - 10 reps  - Standing Hip Abduction with Counter Support  - 1 x daily - 7 x weekly - 3 sets - 10 reps  - Standing March with Counter Support  - 1 x daily - 7 x weekly - 3 sets - 10 reps      Time-based treatments/modalities:           Assessment, Response and Plan:   Impairments: abnormal gait, activity intolerance, impaired physical strength, lacks appropriate home exercise program and limited ADL's    Assessment details:  Pt is a 65 yo female presenting to PT s/p R anterior MIKHAIL performed 9/12/24. Pertinent clinical findings include significant posterior hip weakness which is negatively affecting functional mobility and gait. Impairments are associated w/ difficulty performing ADLs. The patient would benefit from skilled PT to address strength and mobility deficits in order to increase participation with daily activities. The patient states they understand and agree with the plan of care. HEP w/ handout was reviewed and provided to the pt.   Barriers to therapy:  None  Prognosis: good    Goals:   Short Term Goals:   1. Pt will be independent with HEP 3-5x per week for improving strength, ROM and decreasing pain  2. Pt will demo proper sit<>stand mechanics in order to  objects from the ground  3. Pt will report ability to walk 5 minutes w/ LRAD w/o hip pain in order to complete ADLs  Short term goal time span:  2-4 weeks      Long Term Goals:    1. Pt  will demo 30 sec STS 8 reps or better for improved functional mobility  2. Pt will demo gait speed 1.0 m/s or faster w/ LRAD for improved community and household mobility  3. Pt will report ability to tolerate standing x 15 minutes in order to cook dinner  Long term goal time span:  4-6 weeks    Plan:   Therapy options:  Physical therapy treatment to continue  Planned therapy interventions:  Therapeutic Activities (CPT 90408) and Therapeutic Exercise (CPT 15265)  Frequency:  2x week  Duration in weeks:  6  Duration in visits:  12  Discussed with:  Patient  Plan details:  2 units 78083, 2 unit 19445      Functional Assessment Used  Lower Extremity Functional Scale Percentage: 25     Referring provider co-signature:  I have reviewed this plan of care and my co-signature certifies the need for services.    Certification Period: 11/19/2024 to  01/14/25    Physician Signature: ________________________________ Date: ______________

## 2024-12-03 ENCOUNTER — PHYSICAL THERAPY (OUTPATIENT)
Dept: PHYSICAL THERAPY | Facility: REHABILITATION | Age: 64
End: 2024-12-03
Attending: INTERNAL MEDICINE
Payer: MEDICAID

## 2024-12-03 DIAGNOSIS — Z47.1 AFTERCARE FOLLOWING RIGHT HIP JOINT REPLACEMENT SURGERY: ICD-10-CM

## 2024-12-03 DIAGNOSIS — Z96.641 AFTERCARE FOLLOWING RIGHT HIP JOINT REPLACEMENT SURGERY: ICD-10-CM

## 2024-12-03 PROCEDURE — 97530 THERAPEUTIC ACTIVITIES: CPT

## 2024-12-03 PROCEDURE — 97110 THERAPEUTIC EXERCISES: CPT

## 2024-12-03 NOTE — OP THERAPY DAILY TREATMENT
"  Outpatient Physical Therapy  DAILY TREATMENT     [facility] Physical Therapy [address].  [address]  [place] [address]  Phone:  [phone]  Fax:  [phone]    Date: 12/03/2024    Patient: Yola Campuzano  YOB: 1960  MRN: [id number]     Time Calculation    Start time: 1545  Stop time: 1631 Time Calculation (min): 46 minutes         Chief Complaint: Hip Problem    Visit #: 2    SUBJECTIVE:  Pt reports increasing walking. Sometimes gets a dull throbbing in the hip but improves with the heating pad.     OBJECTIVE:  Current objective measures:           Therapeutic Exercises (CPT 17028):     1. Clams, 15x5\" hold, R side only    2. Modified Jean Carlos stretch, x1', painful, w/ PT assist    3. Standing HF stretch lumbar extension, 10x5\" hold, w/ HHA    4. Standing hip ext, x10 ea    5. Standing marches, reviewed    6. Foam roll glutes/TFL w/ PT assist, x4', soft pink roller    7. SB prayer stretch, x2'      Therapeutic Exercise Summary: Access Code: [email or web address]  URL: [email or web address]  Date: 11/19/2024  Prepared by: Ramon Muller    Exercises  - Sit to Stand Without Arm Support  - 1 x daily - 7 x weekly - 3 sets - 10 reps  - Standing Hip Extension with Counter Support  - 1 x daily - 7 x weekly - 3 sets - 10 reps  - Standing Hip Abduction with Counter Support  - 1 x daily - 7 x weekly - 3 sets - 10 reps  - Standing March with Counter Support  - 1 x daily - 7 x weekly - 3 sets - 10 reps    Therapeutic Treatments and Modalities:     1. Therapeutic Activities (CPT 09743), see summary    Therapeutic Treatment and Modalities Summary: Ther Act:   -Gait with SPC, 2x 75 ft w/ focus on proper mechanics and sequencing  -Sit<>stand->goblet sit<>stand 5#, 2x10, w/ cueing for eccentric control - elevated seat height to same height as bed at home    Time-based treatments/modalities:    Physical Therapy Timed Treatment Charges  Therapeutic activity minutes (CPT 41957): 15 minutes  Therapeutic " exercise minutes (CPT 03734): 30 minutes      ASSESSMENT:   Response to treatment: Progressed proximal hip strengthening exercises. Pt tolerated well, but was limited d/t LBP. Demo R HF tightness. Pt is progressing at this time and appropriate to cont PT.    PLAN/RECOMMENDATIONS:   Plan for treatment: therapy treatment to continue next visit.  Planned interventions for next visit: continue with current treatment.

## 2024-12-10 ENCOUNTER — TELEPHONE (OUTPATIENT)
Dept: PHYSICAL THERAPY | Facility: REHABILITATION | Age: 64
End: 2024-12-10
Payer: MEDICAID

## 2024-12-10 NOTE — OP THERAPY DISCHARGE SUMMARY
Outpatient Physical Therapy  DISCHARGE SUMMARY NOTE      [facility] Physical Therapy [facility]  [address]  [address]  [place] [address]  Phone:  [phone]  Fax:  [phone]    Date of Visit: 12/10/2024    Patient: Yola Campuzano  YOB: 1960  MRN: [id number]     Referring Provider: Vannesa Bernard M.D.  [address] [facility]  [address]  [place] [address]   Referring Diagnosis Chronic right shoulder pain [M25.511, G89.29];Chronic bilateral low back pain, unspecified whether sciatica present [M54.50, G89.29]         Functional Assessment Used        Your patient is being discharged from Physical Therapy with the following comments:     Comments:  DC previous episode of care, 6/29/22 to 9/20/22. Has new PT episode of care, 11/19/24.        Ramon Muller, PT, DPT    Date: 12/10/2024

## 2024-12-16 ENCOUNTER — PHYSICAL THERAPY (OUTPATIENT)
Dept: PHYSICAL THERAPY | Facility: REHABILITATION | Age: 64
End: 2024-12-16
Attending: ORTHOPAEDIC SURGERY
Payer: MEDICAID

## 2024-12-16 DIAGNOSIS — Z96.641 AFTERCARE FOLLOWING RIGHT HIP JOINT REPLACEMENT SURGERY: ICD-10-CM

## 2024-12-16 DIAGNOSIS — Z47.1 AFTERCARE FOLLOWING RIGHT HIP JOINT REPLACEMENT SURGERY: ICD-10-CM

## 2024-12-16 PROCEDURE — 97110 THERAPEUTIC EXERCISES: CPT

## 2024-12-16 PROCEDURE — 97530 THERAPEUTIC ACTIVITIES: CPT

## 2024-12-16 NOTE — OP THERAPY DAILY TREATMENT
"  Outpatient Physical Therapy  DAILY TREATMENT     [facility] Physical Therapy [address]  [address]  [place] [address]  Phone:  [phone]  Fax:  [phone]    Date: 12/16/2024    Patient: Yola Campuzano  YOB: 1960  MRN: [id number]     Time Calculation    Start time: 0946  Stop time: 1027 Time Calculation (min): 41 minutes         Chief Complaint: Hip Problem    Visit #: 3    SUBJECTIVE:  Pt reports increased hip pain/soreness after going up and down the stairs more than normal. Wasn't able to do much the next few days, but is feeling more normal now. Is using SPC    OBJECTIVE:  Current objective measures:           Therapeutic Exercises (CPT 15762):     1. Nustep L4, x5'    2. Foam roll glutes/TFL w/ PT assist, x3', soft pink roller    3. SENDY, x3'    4. LTR, x1'    5. TA bracing in hookyling-> w/ march, x5'    6. Bridge with ball squeeze, 10x5\" hold    9. Standing hip ext, not today    10. Standing marches, not today    11. Clams, not today      Therapeutic Exercise Summary: Access Code: [email or web address]  URL: [email or web address]  Date: 11/19/2024  Prepared by: Ramon Muller    Exercises  - Sit to Stand Without Arm Support  - 1 x daily - 7 x weekly - 3 sets - 10 reps  - Standing Hip Extension with Counter Support  - 1 x daily - 7 x weekly - 3 sets - 10 reps  - Standing Hip Abduction with Counter Support  - 1 x daily - 7 x weekly - 3 sets - 10 reps  - Standing March with Counter Support  - 1 x daily - 7 x weekly - 3 sets - 10 reps    Therapeutic Treatments and Modalities:     1. Therapeutic Activities (CPT 29814), See summary    Therapeutic Treatment and Modalities Summary: Ther Act: for carryover to functional transfers, sit<>stands  -DL shuttle L4, 2x10  -SL shuttle L2, x10 ea    Time-based treatments/modalities:    Physical Therapy Timed Treatment Charges  Therapeutic activity minutes (CPT 25823): 8 minutes  Therapeutic exercise minutes (CPT 29242): 33 minutes      ASSESSMENT: "   Response to treatment: Pt demo improved activity tolerance w/ cueing for TA bracing/proper core activation. Was able to progress functional strengthening exercises with increased resistance and difficulty. Pt is progressing at this time and appropriate to cont PT.    PLAN/RECOMMENDATIONS:   Plan for treatment: therapy treatment to continue next visit.  Planned interventions for next visit: continue with current treatment.

## 2024-12-30 ENCOUNTER — APPOINTMENT (OUTPATIENT)
Dept: PHYSICAL THERAPY | Facility: REHABILITATION | Age: 64
End: 2024-12-30
Attending: ORTHOPAEDIC SURGERY
Payer: MEDICAID

## 2025-01-02 ENCOUNTER — TELEPHONE (OUTPATIENT)
Dept: PHYSICAL THERAPY | Facility: REHABILITATION | Age: 65
End: 2025-01-02
Payer: MEDICAID

## 2025-01-02 DIAGNOSIS — Z47.1 AFTERCARE FOLLOWING RIGHT HIP JOINT REPLACEMENT SURGERY: ICD-10-CM

## 2025-01-02 DIAGNOSIS — Z96.641 AFTERCARE FOLLOWING RIGHT HIP JOINT REPLACEMENT SURGERY: ICD-10-CM

## 2025-01-02 NOTE — OP THERAPY DISCHARGE SUMMARY
Outpatient Physical Therapy  DISCHARGE SUMMARY NOTE      [facility] Therapy [address]  [address]  [place] [address]  Phone:  [phone]  Fax:  [phone]    Date of Visit: 01/02/2025    Patient: Yola Campuzano  YOB: 1960  MRN: [id number]     Referring Provider: Ramesh Pretty M.D.  [address] [facility] [place] [address]   Referring Diagnosis No admission diagnoses are documented for this encounter.         Functional Assessment Used        Your patient is being discharged from Physical Therapy with the following comments:   Patient cancelled or missed more than 2 scheduled appointments/non-compliant    Comments:  Pt has attended 3 visits from 11/19/24 to 12/16/24. DC per no-show/late-cancellation policy. Unable to assess goals.       Ramon Muller, PT, DPT    Date: 1/2/2025

## 2025-03-19 ENCOUNTER — APPOINTMENT (OUTPATIENT)
Dept: PHYSICAL THERAPY | Facility: REHABILITATION | Age: 65
End: 2025-03-19
Attending: ORTHOPAEDIC SURGERY
Payer: MEDICAID

## 2025-03-25 ENCOUNTER — APPOINTMENT (OUTPATIENT)
Dept: PHYSICAL THERAPY | Facility: REHABILITATION | Age: 65
End: 2025-03-25
Attending: ORTHOPAEDIC SURGERY
Payer: MEDICAID

## 2025-03-27 ENCOUNTER — APPOINTMENT (OUTPATIENT)
Dept: PHYSICAL THERAPY | Facility: REHABILITATION | Age: 65
End: 2025-03-27
Attending: ORTHOPAEDIC SURGERY
Payer: MEDICAID

## 2025-04-01 ENCOUNTER — APPOINTMENT (OUTPATIENT)
Dept: PHYSICAL THERAPY | Facility: REHABILITATION | Age: 65
End: 2025-04-01
Attending: ORTHOPAEDIC SURGERY
Payer: MEDICAID

## 2025-04-03 ENCOUNTER — APPOINTMENT (OUTPATIENT)
Dept: PHYSICAL THERAPY | Facility: REHABILITATION | Age: 65
End: 2025-04-03
Attending: ORTHOPAEDIC SURGERY
Payer: MEDICAID

## 2025-04-03 NOTE — ASSESSMENT & PLAN NOTE
Chronic, ongoing. Reviewed lipid profile from 2020. No current lipid-lowering medication use. Provided education on heart healthy diet with adequate intake of fruits, vegetables, and whole grains. Encourage 30 minutes of moderate exercise 3-4 times a week. Provided [employer] gym resources. Denies chest pain and claudication.         Component  Ref Range & Units (hover) 4 yr ago  (8/5/20)   Cholesterol,Tot 196   Triglycerides 123   HDL 40    High

## 2025-04-03 NOTE — ASSESSMENT & PLAN NOTE
Chronic, stable. Reviewed DEXA scan from 2020. Encourage weight-bearing activity. Denies recent falls or fractures. Monitored by primary care provider.

## 2025-04-08 ENCOUNTER — APPOINTMENT (OUTPATIENT)
Dept: PHYSICAL THERAPY | Facility: REHABILITATION | Age: 65
End: 2025-04-08
Attending: ORTHOPAEDIC SURGERY
Payer: MEDICAID

## 2025-04-08 ENCOUNTER — OFFICE VISIT (OUTPATIENT)
Dept: MEDICAL GROUP | Facility: MEDICAL CENTER | Age: 65
End: 2025-04-08
Payer: MEDICARE

## 2025-04-08 VITALS
DIASTOLIC BLOOD PRESSURE: 80 MMHG | BODY MASS INDEX: 24.96 KG/M2 | OXYGEN SATURATION: 99 % | TEMPERATURE: 97.6 F | WEIGHT: 146.2 LBS | SYSTOLIC BLOOD PRESSURE: 136 MMHG | HEIGHT: 64 IN | HEART RATE: 65 BPM

## 2025-04-08 DIAGNOSIS — Z78.0 ENCOUNTER FOR OSTEOPOROSIS SCREENING IN ASYMPTOMATIC POSTMENOPAUSAL PATIENT: ICD-10-CM

## 2025-04-08 DIAGNOSIS — F17.200 TOBACCO DEPENDENCE: ICD-10-CM

## 2025-04-08 DIAGNOSIS — Z12.11 ENCOUNTER FOR COLORECTAL CANCER SCREENING: ICD-10-CM

## 2025-04-08 DIAGNOSIS — Z12.12 ENCOUNTER FOR COLORECTAL CANCER SCREENING: ICD-10-CM

## 2025-04-08 DIAGNOSIS — G47.00 INSOMNIA, UNSPECIFIED TYPE: ICD-10-CM

## 2025-04-08 DIAGNOSIS — M85.80 OSTEOPENIA, UNSPECIFIED LOCATION: ICD-10-CM

## 2025-04-08 DIAGNOSIS — Z12.31 SCREENING MAMMOGRAM, ENCOUNTER FOR: ICD-10-CM

## 2025-04-08 DIAGNOSIS — E78.5 DYSLIPIDEMIA: ICD-10-CM

## 2025-04-08 DIAGNOSIS — Z13.820 ENCOUNTER FOR OSTEOPOROSIS SCREENING IN ASYMPTOMATIC POSTMENOPAUSAL PATIENT: ICD-10-CM

## 2025-04-08 PROBLEM — H61.23 BILATERAL IMPACTED CERUMEN: Status: RESOLVED | Noted: 2024-03-28 | Resolved: 2025-04-08

## 2025-04-08 PROBLEM — K21.9 GERD (GASTROESOPHAGEAL REFLUX DISEASE): Status: RESOLVED | Noted: 2019-09-03 | Resolved: 2025-04-08

## 2025-04-08 PROCEDURE — G0438 PPPS, INITIAL VISIT: HCPCS

## 2025-04-08 PROCEDURE — 3079F DIAST BP 80-89 MM HG: CPT

## 2025-04-08 PROCEDURE — 3075F SYST BP GE 130 - 139MM HG: CPT

## 2025-04-08 PROCEDURE — 1125F AMNT PAIN NOTED PAIN PRSNT: CPT

## 2025-04-08 SDOH — ECONOMIC STABILITY: FOOD INSECURITY: WITHIN THE PAST 12 MONTHS, THE FOOD YOU BOUGHT JUST DIDN'T LAST AND YOU DIDN'T HAVE MONEY TO GET MORE.: NEVER TRUE

## 2025-04-08 SDOH — ECONOMIC STABILITY: FOOD INSECURITY: HOW HARD IS IT FOR YOU TO PAY FOR THE VERY BASICS LIKE FOOD, HOUSING, MEDICAL CARE, AND HEATING?: NOT HARD AT ALL

## 2025-04-08 SDOH — ECONOMIC STABILITY: FOOD INSECURITY: WITHIN THE PAST 12 MONTHS, YOU WORRIED THAT YOUR FOOD WOULD RUN OUT BEFORE YOU GOT THE MONEY TO BUY MORE.: NEVER TRUE

## 2025-04-08 SDOH — ECONOMIC STABILITY: TRANSPORTATION INSECURITY: IN THE PAST 12 MONTHS, HAS LACK OF TRANSPORTATION KEPT YOU FROM MEDICAL APPOINTMENTS OR FROM GETTING MEDICATIONS?: NO

## 2025-04-08 ASSESSMENT — PATIENT HEALTH QUESTIONNAIRE - PHQ9
CLINICAL INTERPRETATION OF PHQ2 SCORE: 2
SUM OF ALL RESPONSES TO PHQ QUESTIONS 1-9: 8
5. POOR APPETITE OR OVEREATING: 2 - MORE THAN HALF THE DAYS

## 2025-04-08 ASSESSMENT — ACTIVITIES OF DAILY LIVING (ADL)
LACK_OF_TRANSPORTATION: NO
BATHING_REQUIRES_ASSISTANCE: 0

## 2025-04-08 ASSESSMENT — ENCOUNTER SYMPTOMS: GENERAL WELL-BEING: EXCELLENT

## 2025-04-08 ASSESSMENT — PAIN SCALES - GENERAL: PAINLEVEL_OUTOF10: 1=MINIMAL PAIN

## 2025-04-08 NOTE — PROGRESS NOTES
Comprehensive Health Assessment Program     Yola Campuzano is a 65 y.o. here for her comprehensive health assessment.    Patient Active Problem List    Diagnosis Date Noted    Encounter for colorectal cancer screening 04/08/2025    Encounter for osteoporosis screening in asymptomatic postmenopausal patient 04/08/2025    Screening mammogram, encounter for 04/08/2025    Shortness of breath 05/09/2024    Heart murmur 05/09/2024    Primary osteoarthritis of right hip 05/01/2024    Primary osteoarthritis of both hips 04/16/2024    Avascular necrosis of bone of hip, right (HCC) 04/16/2024    Dupuytren's contracture of hand 03/28/2024    Onychomycosis 03/28/2024    Mixed incontinence urge and stress 10/19/2022    Chronic right shoulder pain 04/11/2022    Other fatigue 05/20/2021    Snoring 05/20/2021    Elevated alkaline phosphatase level 02/12/2020    Right hip pain 06/12/2019    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 60 Tablet 5    aspirin 81 MG EC tablet Take 1 Tablet by mouth 2 times a day. 60 Tablet 0    ondansetron (ZOFRAN ODT) 4 MG TABLET DISPERSIBLE Take 1 Tablet by mouth every 6 hours as needed for Nausea/Vomiting. 10 Tablet 0    ergocalciferol (DRISDOL) 20038 UNIT capsule Take 1 Capsule by mouth every 14 days. 4 Capsule 5    terbinafine (LAMISIL) 250 MG Tab Take 1 Tablet by mouth every day. 30 Tablet 3     No current facility-administered medications for this visit.          Current supplements as per medication list.     Allergies:   Albuterol  Social History     Tobacco Use    Smoking status: Every Day     Current packs/day: 0.50     Average  packs/day: 0.5 packs/day for 45.0 years (22.5 ttl pk-yrs)     Types: Cigarettes    Smokeless tobacco: Never    Tobacco comments:     previous 1 ppd for many years   Vaping Use    Vaping status: Never Used   Substance Use Topics    Alcohol use: Not Currently     Comment: 2-3 drinks every other night.    Drug use: Yes     Comment: previous meth use, smokes marijuana.     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    She has no past medical history of Clotting disorder (HCC), COPD, or Cushings syndrome ([facility]).   Past Surgical History:   Procedure Laterality Date    HIP ARTH ANTERIOR TOTAL Right 9/12/2024    Procedure: RIGHT ANTERIOR TOTAL HIP ARTHROPLASTY;  Surgeon: Ramesh Pretty M.D.;  Location: SURGERY [facility];  Service: Orthopedics    OPEN REDUCTION  1991    left ankle       Screening:  In the last six months have you experienced any leakage of urine? Yes, reports occasional leakage with use of absorbent pads.    Depression Screening  Little interest or pleasure in doing things?  1 - several days  Feeling down, depressed , or hopeless? 1 - several days  Trouble falling or staying asleep, or sleeping too much?  1 - several days  Feeling tired or having little energy?  1 - several days  Poor appetite or overeating?  2 - more  than half the days  Feeling bad about yourself - or that you are a failure or have let yourself or your family down? 2 - more than half the days  Trouble concentrating on things, such as reading the newspaper or watching television? 0 - not at all  Moving or speaking so slowly that other people could have noticed.  Or the opposite - being so fidgety or restless that you have been moving around a lot more than usual?  0 - not at all  Thoughts that you would be better off dead, or of hurting yourself?  0 - not at all  Patient Health Questionnaire Score: 8    If depressive symptoms identified deferred to follow up visit unless specifically addressed in assessment and plan.    Interpretation of PHQ-9 Total Score   Score Severity   1-4 No Depression   5-9 Mild Depression   10-14 Moderate Depression   15-19 Moderately Severe Depression   20-27 Severe Depression    Screening for Cognitive Impairment  Do you or any of your friends or family members have any concern about your memory? No  Three Minute Recall (Village, Kitchen, Baby) 3/3    Mack clock face with all 12 numbers and set the hands to show 10 minutes past 11.  Yes    Cognitive concerns identified deferred for follow up unless specifically addressed in assessment and plan.    Fall Risk Assessment  Has the patient had two or more falls in the last year or any fall with injury in the last year?  No    Safety Assessment  Do you always wear your seatbelt?  Yes  Any changes to home needed to function safely? No  Difficulty hearing.  Yes  Patient counseled about all safety risks that were identified.    Functional Assessment ADLs  Are there any barriers preventing you from cooking for yourself or meeting nutritional needs?  No.    Are there any barriers preventing you from driving safely or obtaining transportation?  No.    Are there any barriers preventing you from using a telephone or calling for help?  No    Are there any barriers preventing you from shopping?  No.     Are there any barriers preventing you from taking care of your own finances?  No    Are there any barriers preventing you from managing your medications?  No    Are there any barriers preventing you from showering, bathing or dressing yourself? No    Are there any barriers preventing you from doing housework or laundry? No    Are there any barriers preventing you from using the toilet?No    Are you currently engaging in any exercise or physical activity?  No.      Self-Assessment of Health  What is your perception of your health? Excellent    Do you sleep more than six hours a night? No    In the past 7 days, how much did pain keep you from doing your normal work? Some Bilat hip pain.  Do you spend quality time with family or friends (virtually or in person)? Yes    Do you usually eat a heart healthy diet that constists of a variety of fruits, vegetables, whole grains and fiber? No    Do you eat foods high in fat and/or Fast Food more than three times per week? No    How concerned are you that your medical conditions are not being well managed? Not at all    Are you worried that in the next 2 months, you may not have stable housing that you own, rent, or stay in as part of a household? Yes        Advance Care Planning  Do you have an Advance Directive, Living Will, Durable Power of , or POLST? No   Provided patient with educational brochure regarding Advance Care Planning.                  Health Maintenance Summary            Current Care Gaps       Cervical Cancer Screening (Every 5 Years) Overdue since 9/27/2022 09/27/2017  PATHOLOGY GYN SPECIMEN    09/27/2017  THINPREP PAP WITH HPV    04/15/2014  PATHOLOGY GYN SPECIMEN    04/15/2014  GYN3;THINPREP PAP WITH HPV    10/16/2012  Previously completed     Only the first 5 history entries have been loaded, but more history exists.            COVID-19 Vaccine (7 - 2024-25 season) Overdue since 9/1/2024 12/21/2023  Imm Admin: COVID-19, mRNA, LNP-S,  PF, thalia-sucrose, 30 mcg/0.3 mL    01/25/2023  Imm Admin: PFIZER BIVALENT SARS-COV-2 VACCINE (12+)    08/01/2022  Imm Admin: PFIZER ARMENDARIZ CAP SARS-COV-2 VACCINATION (12+)    12/21/2021  Imm Admin: PFIZER PURPLE CAP SARS-COV-2 VACCINATION (12+)    06/15/2021  Imm Admin: PFIZER PURPLE CAP SARS-COV-2 VACCINATION (12+)      Only the first 5 history entries have been loaded, but more history exists.              Zoster (Shingles) Vaccines (1 of 2) Never done      No completion history exists for this topic.              Lung Cancer Screening Shared Decision Making (Once) Never done     No completion history exists for this topic.                      Needs Review       Hepatitis C Screening  Tentatively Complete      06/29/2015  Hepatitis C Antibody component of HEPATITIS PANEL ACUTE(4 COMPONENTS)    09/22/2012  Hepatitis C Antibody component of HEPATITIS PANEL ACUTE(4 COMPONENTS)              Bone Density Scan (Every 5 Years) Tentatively due on 10/7/2025      04/08/2025  Order placed for DS-BONE DENSITY STUDY (DEXA) by ADDY [email or web address].    10/07/2020  DS-BONE DENSITY STUDY (DEXA)    08/29/2012  DS-BONE DENSITY STUDY (DEXA)                      Awaiting Completion       Colorectal Cancer Screening (Colonoscopy - Every 10 Years) Order placed this encounter      04/08/2025  Order placed for Cologuard® colon cancer screening by ADDY [email or web address].[email or web address].    12/15/2015  Colon Cancer Screening Annual FIT (Patient Declined)    12/11/2015  OCCULT BLOOD FECES IMMUNOASSAY    12/18/2012  Colonoscopy (Patient Declined - lack of insurance/financial constraints)    11/03/2012  OCCULT BLOOD FECES IMMUNOASSAY     Only the first 5 history entries have been loaded, but more history exists.            Mammogram (Yearly) Order placed this encounter      04/08/2025  Order placed for MA-SCREENING MAMMO BILAT W/TOMOSYNTHESIS W/CAD by ADDY [email or web address].    04/09/2024  MA-SCREENING MAMMO BILAT W/TOMOSYNTHESIS W/CAD    04/25/2022   MA-SCREENING MAMMO BILAT W/TOMOSYNTHESIS W/CAD    10/07/2020  MA-SCREENING MAMMO BILAT W/TOMOSYNTHESIS W/CAD    06/03/2019  MA-SCREENING MAMMO BILAT W/TOMOSYNTHESIS W/CAD      Only the first 5 history entries have been loaded, but more history exists.                      Upcoming       IMM DTaP/Tdap/Td Vaccine (2 - Td or Tdap) Next due on 12/15/2025      12/15/2015  Imm Admin: Tdap Vaccine              Annual Wellness Visit (Yearly) Next due on 4/8/2026 04/08/2025  Level of Service: [place] ANNUAL WELLNESS VISIT-INCLUDES PPPS SUBSEQUE*    04/08/2025  Done - Comprehensive Health Assessment                      Completed or No Longer Recommended       Pneumococcal Vaccine: 50+ Years (Series Information) Completed      01/19/2023  Imm Admin: Pneumococcal Conjugate Vaccine (PCV20)    11/23/2016  Imm Admin: Pneumococcal polysaccharide vaccine (PPSV-23)              Hepatitis A Vaccine (Hep A) (Series Information) Aged Out      04/14/2006  Imm Admin: Hep A/HEP B Combined Vaccine (TwinRix)              HPV Vaccines (Series Information) Aged Out      No completion history exists for this topic.              Polio Vaccine (Inactivated Polio) (Series Information) Aged Out     No completion history exists for this topic.              Meningococcal Immunization (Series Information) Aged Out     No completion history exists for this topic.              Hepatitis B Vaccine (Hep B)  Discontinued      04/14/2006  Imm Admin: Hep A/HEP B Combined Vaccine (TwinRix)              Influenza Vaccine  Discontinued      12/21/2023  Imm Admin: Influenza Vaccine Quad Inj (Pf)    10/19/2022  Imm Admin: Influenza Vaccine Quad Inj (Pf)                            Patient Care Team:  Vannesa Bernard M.D. as PCP - General (Internal Medicine)  Julia Pinto R.N. as     Financial Resource Strain: Low Risk  (4/8/2025)    Overall Financial Resource Strain (CARDIA)     Difficulty of Paying Living Expenses: Not hard at all   Recent  "Concern: Financial Resource Strain - Medium Risk (2/4/2025)    Overall Financial Resource Strain (CARDIA)     Difficulty of Paying Living Expenses: Somewhat hard      Transportation Needs: No Transportation Needs (4/8/2025)    PRAPARE - Transportation     Lack of Transportation (Medical): No     Lack of Transportation (Non-Medical): No   Recent Concern: Transportation Needs - Unmet Transportation Needs (2/4/2025)    PRAPARE - Transportation     Lack of Transportation (Medical): Yes     Lack of Transportation (Non-Medical): Yes      Food Insecurity: No Food Insecurity (4/8/2025)    Hunger Vital Sign     Worried About Running Out of Food in the Last Year: Never true     Ran Out of Food in the Last Year: Never true   Recent Concern: Food Insecurity - Food Insecurity Present (2/4/2025)    Hunger Vital Sign     Worried About Running Out of Food in the Last Year: Sometimes true     Ran Out of Food in the Last Year: Sometimes true     Provided multiple resources to food pantries.       Encounter Vitals  Temperature: 36.4 °C (97.6 °F)  Blood Pressure : 136/80  Pulse: 65  Pulse Oximetry: 99 %  Weight: 66.3 kg (146 lb 3.2 oz)  Height: 162.6 cm (5' 4\")  BMI (Calculated): 25.1  Pain Score: 1=Minimal Pain     ROS:  No fever, chills, nausea, vomiting, diarrhea, chest pain or shortness of breath. See HPI.    Physical Exam  Vitals reviewed.   Constitutional:       Appearance: Normal appearance.   Cardiovascular:      Rate and Rhythm: Normal rate and regular rhythm.      Pulses: Normal pulses.      Heart sounds: Normal heart sounds.   Pulmonary:      Effort: Pulmonary effort is normal.      Breath sounds: Normal breath sounds.   Skin:     General: Skin is warm and dry.      Capillary Refill: Capillary refill takes less than 2 seconds.   Neurological:      General: No focal deficit present.      Mental Status: She is alert and oriented to person, place, and time.       Assessment and Plan. The following treatment and monitoring plan " is recommended:    Dyslipidemia  Chronic, ongoing. Reviewed most recent lipid profile from 2020. No current lipid-lowering medication use. Provided education on heart healthy diet with adequate intake of fruits, vegetables, and whole grains. Encourage 30 minutes of moderate exercise 3-4 times a week. Provided [employer] gym resources. Denies chest pain and claudication.         Component  Ref Range & Units (hover) 4 yr ago  (8/5/20)   Cholesterol,Tot 196   Triglycerides 123   HDL 40    High         Insomnia  Chronic, ongoing. No current sleep-aid-- discussed use of over-the-counter melatonin. Reports an average of 6 hours of sleep per night, but sleep is usually interrupted. Discussed sleep hygiene to promote quality sleep.    Osteopenia  Chronic, stable. Reviewed DEXA scan from 2020. Encourage weight-bearing activity. Denies recent falls or fractures. Monitored by primary care provider.    Tobacco dependence  Chronic, ongoing. Reports smoking 5-10 cigarettes daily. Provided smoking cessation resources.     Encounter for colorectal cancer screening  Cologuard ordered.    Encounter for osteoporosis screening in asymptomatic postmenopausal patient  DEXA scan ordered.    Screening mammogram, encounter for  Mammogram ordered.        Services suggested: No services needed at this time  Health Care Screening: Age-appropriate preventive services recommended by USPTF and ACIP covered by Medicare were discussed today. Services ordered if indicated and agreed upon by the patient.  Referrals offered: Community-based lifestyle interventions to reduce health risks and promote self-management and wellness, fall prevention, nutrition, physical activity, tobacco-use cessation, weight loss, and mental health services as per orders if indicated.    Discussion today about general wellness and lifestyle habits:    Prevent falls and reduce trip hazards; Cautioned about securing or removing rugs.  Have a working fire alarm  and carbon monoxide detector.  Engage in regular physical activity and social activities.    Follow-up: Return for appointment with Primary Care Provider as needed.

## 2025-04-08 NOTE — ASSESSMENT & PLAN NOTE
Chronic, ongoing. No current sleep-aid-- discussed use of over-the-counter melatonin. Reports an average of 6 hours of sleep per night, but sleep is usually interrupted. Discussed sleep hygiene to promote quality sleep.

## 2025-04-10 ENCOUNTER — APPOINTMENT (OUTPATIENT)
Dept: PHYSICAL THERAPY | Facility: REHABILITATION | Age: 65
End: 2025-04-10
Attending: ORTHOPAEDIC SURGERY
Payer: MEDICAID

## 2025-05-29 ENCOUNTER — HOSPITAL ENCOUNTER (OUTPATIENT)
Dept: RADIOLOGY | Facility: MEDICAL CENTER | Age: 65
End: 2025-05-29
Payer: MEDICARE

## 2025-05-29 ENCOUNTER — RESULTS FOLLOW-UP (OUTPATIENT)
Dept: MEDICAL GROUP | Facility: MEDICAL CENTER | Age: 65
End: 2025-05-29

## 2025-05-29 DIAGNOSIS — Z12.31 SCREENING MAMMOGRAM, ENCOUNTER FOR: ICD-10-CM

## 2025-05-29 DIAGNOSIS — M85.80 OSTEOPENIA, UNSPECIFIED LOCATION: ICD-10-CM

## 2025-05-29 PROCEDURE — 77067 SCR MAMMO BI INCL CAD: CPT

## 2025-05-29 PROCEDURE — 77080 DXA BONE DENSITY AXIAL: CPT

## 2025-05-31 NOTE — PROGRESS NOTES
Dear Sheyla Jones, Your recent lab results from May 15, 2025, were reviewed and are overall reassuring. Your kidney function, electrolytes, thyroid levels, and iron stores are within acceptable limits. Glucose, sodium, potassium, and TSH levels are all normal, and your ferritin is adequate at 95 ng/mL.  However, your vitamin D level is significantly low at 7.7 ng/mL, which may contribute to fatigue, muscle aches, or bone discomfort. I recommend starting vitamin D3 50,000 IU once weekly for the next 6 months, followed by 2,000 IU daily thereafter to maintain your levels. Your creatinine is slightly low at 0.51 mg/dL, but your estimated glomerular filtration rate (eGFR) is excellent at 130, so no concern there.  Your iron saturation is at the low end of normal at 20%, but with normal total iron, TIBC, and ferritin, no intervention is needed currently.  Please follow up with your primary care provider or a new endocrinologist to monitor progress and reassess as needed.  Wishing you continued health and wellness,  Dr. Sevilla Subjective:   Yola Campuzano is a 62 y.o. female here today for shoulder and back pain    Chronic right shoulder pain  She continues to report pain in her right deltoid and biceps region.  She has limited range of motion.  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  She did have an MRI of the shoulder which did not show any rotator cuff damage but she did have a little bit of tendinitis.  She saw an orthopedist who offered her an injection which she declined.  She was told to follow-up with physical therapy but has not done so.    Osteopenia  She is currently taking supplemental vitamin D 50,000 units weekly.  She is requesting a prescription for calcium.    Dyslipidemia  The 10-year ASCVD risk score (Shay MADHAV Jr., et al., 2013) is: 7.4%  She is due for updated labs.  She is not currently on any lipid-lowering medication.    Low back pain  She continues to report chronic low back pain.  She had an x-ray done in 2019 which showed mild to moderate facet arthropathy and degenerative changes especially in the lower lumbar spine.  She continues to report low back pain on a regular basis.  She typically takes ibuprofen 600 mg about once a day.  She was given a prescription for meloxicam but she has not tried that yet.  She is also taking gabapentin 400 mg 2-3 times a day.  She denies numbness, weakness, or tingling in her legs.  It has been more difficult for her to do daily activities lately due to the worsening pain       Current medicines (including changes today)  Current Outpatient Medications   Medication Sig Dispense Refill   • calcium carbonate (OS-GLENN 500) 500 MG Tab Take 1 Tablet by mouth 2 times a day with meals. 60 Tablet 5   • acetaminophen (TYLENOL) 500 MG Tab Take 1-2 Tablets by mouth every 6 hours as needed for Moderate Pain. 60 Tablet 2   • ergocalciferol (DRISDOL) 56579 UNIT capsule Take 1 Cap by mouth every 14 days. 4 Capsule 5   • gabapentin  (NEURONTIN) 400 MG Cap Take 1 Cap by mouth 3 times a day. 90 Capsule 5   • nicotine polacrilex (NICORETTE) 2 MG Gum Chew 1 PIECE BY MOUTH IF NEEDED FOR SMOKING CESSATION (Patient taking differently: Chew 1 PIECE BY MOUTH IF NEEDED FOR SMOKING CESSATION) 50 Each 3   • ibuprofen (MOTRIN) 600 MG Tab Take 1 tablet by mouth every 8 hours as needed for Moderate Pain. 60 Tablet 5   • meloxicam (MOBIC) 15 MG tablet Take 1 Tablet by mouth every day. (Patient not taking: Reported on 6/15/2022) 30 Tablet 0     No current facility-administered medications for this visit.     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    She has no past medical history of Clotting disorder ([facility]), COPD, or Cushings syndrome ([facility]).         Objective:     Vitals:    06/15/22 1303   BP: 112/68   Pulse: 82   Resp: 16   Temp: 36.7 °C (98.1 °F)   SpO2: 93%     Body mass index is 28.99 kg/m².   Physical Exam:  Constitutional: Alert, no distress.  Skin: Warm, dry, good turgor, no rashes in visible areas.  Eye: Equal, round and reactive, conjunctiva clear, lids normal.  Psych: Alert and oriented x3, normal affect and mood.  MSK: Right shoulder with some mild tenderness to palpation over the biceps and deltoid, abduction limited to about 35 degrees and forward flexion limited to about 80 degrees, negative impingement sign      Assessment and Plan:   The following treatment plan was discussed    1. Chronic right shoulder pain  Concerned that prolonged immobility has caused her to develop some early adhesive capsulitis.  We discussed importance of starting formal physical therapy so that she does not develop  worsening immobility.  Her MRI was fairly unremarkable although she had some tendinitis.  We discussed a subacromial bursa injection which she declines today.  - Referral to Physical Therapy    2. Osteopenia, unspecified location  - VITAMIN D,25 HYDROXY; Future  - calcium carbonate (OS-GLENN 500) 500 MG Tab; Take 1 Tablet by mouth 2 times a day with meals.  Dispense: 60 Tablet; Refill: 5  - ergocalciferol (DRISDOL) 51381 UNIT capsule; Take 1 Cap by mouth every 14 days.  Dispense: 4 Capsule; Refill: 5    3. Elevated alkaline phosphatase level  - Comp Metabolic Panel; Future    4. Dyslipidemia  - Lipid Profile; Future    5. Screening for diabetes mellitus  - HEMOGLOBIN A1C; Future    6. Chronic bilateral low back pain, unspecified whether sciatica present  We will obtain updated imaging.  We will add Tylenol to her regimen.  We discussed working with physical therapy as she is not interested in any injections or procedures.  - Referral to Physical Therapy  - DX-LUMBAR SPINE-2 OR 3 VIEWS; Future  - acetaminophen (TYLENOL) 500 MG Tab; Take 1-2 Tablets by mouth every 6 hours as needed for Moderate Pain.  Dispense: 60 Tablet; Refill: 2  - gabapentin (NEURONTIN) 400 MG Cap; Take 1 Cap by mouth 3 times a day.  Dispense: 90 Capsule; Refill: 5        Followup: Return in about 4 months (around 10/15/2022).

## (undated) DEVICE — SET LEADWIRE 5 LEAD BEDSIDE DISPOSABLE ECG (1SET OF 5/EA)

## (undated) DEVICE — DRESSING STERILE BURN ACTICOAT FLEX 7 (50EA/CA)

## (undated) DEVICE — LACTATED RINGERS INJ 1000 ML - (14EA/CA 60CA/PF)

## (undated) DEVICE — GOWN WARMING STANDARD FLEX - (30/CA)

## (undated) DEVICE — SUTURE 3-0 MONOCRYL PLUS PS-1 - 27 INCH (36/BX)

## (undated) DEVICE — SUCTION INSTRUMENT YANKAUER BULBOUS TIP W/O VENT (50EA/CA)

## (undated) DEVICE — CANISTER SUCTION RIGID RED 1500CC (40EA/CA)

## (undated) DEVICE — SUTURE 1 VICRYL PLUS CTX - 8 X 18 INCH (12/BX)

## (undated) DEVICE — SODIUM CHL. IRRIGATION 0.9% 3000ML (4EA/CA 65CA/PF)

## (undated) DEVICE — LENS/HOOD FOR SPACESUIT - (32/PK) PEEL AWAY FACE

## (undated) DEVICE — TOWELS CLOTH SURGICAL - (4/PK 20PK/CA)

## (undated) DEVICE — TUBING CLEARLINK DUO-VENT - C-FLO (48EA/CA)

## (undated) DEVICE — DERMABOND ADVANCED - (12EA/BX)

## (undated) DEVICE — DRAPE SRG LG BCK TBL DISP - 10/CA

## (undated) DEVICE — DRAPE C-ARM LARGE 41IN X 74 IN - (10/BX 2BX/CA)

## (undated) DEVICE — SODIUM CHL IRRIGATION 0.9% 1000ML (12EA/CA)

## (undated) DEVICE — HANDPIECE 10FT INTPLS SCT PLS IRRIGATION HAND CONTROL SET (6/PK)

## (undated) DEVICE — PAD PREP 24 X 48 CUFFED - (100/CA)

## (undated) DEVICE — DRAPE SURGICAL U 77X120 - (10/CA)

## (undated) DEVICE — SUTURE 2-0 VICRYL PLUS CT-1 - 8 X 18 INCH(12/BX)

## (undated) DEVICE — HUMID-VENT HEAT AND MOISTURE EXCHANGE- (50/BX)

## (undated) DEVICE — DRAPE IOBAN LARGE 2 INCISE FILM (5EA/CA)

## (undated) DEVICE — BLADE 90X18X1.27MM SAW SAGITTAL

## (undated) DEVICE — SUTURE 5 ETHIBOND V-37 (12PK/BX)

## (undated) DEVICE — SET EXTENSION WITH 2 PORTS (48EA/CA) ***PART #2C8610 IS A SUBSTITUTE*****

## (undated) DEVICE — SENSOR OXIMETER ADULT SPO2 RD SET (20EA/BX)

## (undated) DEVICE — DISPOSABLE WOUND VAC PICO 10 X 20 CM - WOUND CARE (3/CA)

## (undated) DEVICE — SUTURE GENERAL

## (undated) DEVICE — PACK TOTAL HIP - (1/CA)

## (undated) DEVICE — GLOVE BIOGEL PI ORTHO SZ 7.5 PF LF (40PR/BX)

## (undated) DEVICE — ELECTRODE DUAL RETURN W/ CORD - (50/PK)

## (undated) DEVICE — GLOVE BIOGEL PI INDICATOR SZ 8.0 SURGICAL PF LF -(50/BX 4BX/CA)

## (undated) DEVICE — TOWEL STOP TIMEOUT SAFETY FLAG (40EA/CA)

## (undated) DEVICE — TIP INTPLS HFLO ML ORFC BTRY - (12/CS) FOR SURGILAV

## (undated) DEVICE — GLOVE BIOGEL SZ 8 SURGICAL PF LTX - (50PR/BX 4BX/CA)